# Patient Record
Sex: MALE | Race: WHITE | NOT HISPANIC OR LATINO | Employment: FULL TIME | URBAN - METROPOLITAN AREA
[De-identification: names, ages, dates, MRNs, and addresses within clinical notes are randomized per-mention and may not be internally consistent; named-entity substitution may affect disease eponyms.]

---

## 2017-02-13 ENCOUNTER — ALLSCRIPTS OFFICE VISIT (OUTPATIENT)
Dept: OTHER | Facility: OTHER | Age: 62
End: 2017-02-13

## 2017-03-08 ENCOUNTER — ALLSCRIPTS OFFICE VISIT (OUTPATIENT)
Dept: OTHER | Facility: OTHER | Age: 62
End: 2017-03-08

## 2017-03-21 ENCOUNTER — TRANSCRIBE ORDERS (OUTPATIENT)
Dept: ADMINISTRATIVE | Facility: HOSPITAL | Age: 62
End: 2017-03-21

## 2017-03-21 DIAGNOSIS — R22.1 NECK MASS: Primary | ICD-10-CM

## 2017-03-27 ENCOUNTER — HOSPITAL ENCOUNTER (OUTPATIENT)
Dept: CT IMAGING | Facility: HOSPITAL | Age: 62
Discharge: HOME | End: 2017-03-27
Payer: COMMERCIAL

## 2017-03-27 DIAGNOSIS — R22.1 NECK MASS: ICD-10-CM

## 2017-03-27 PROCEDURE — 70491 CT SOFT TISSUE NECK W/DYE: CPT

## 2017-03-27 RX ADMIN — IOHEXOL 85 ML: 350 INJECTION, SOLUTION INTRAVENOUS at 06:59

## 2017-03-30 ENCOUNTER — ALLSCRIPTS OFFICE VISIT (OUTPATIENT)
Dept: OTHER | Facility: OTHER | Age: 62
End: 2017-03-30

## 2017-04-07 ENCOUNTER — GENERIC CONVERSION - ENCOUNTER (OUTPATIENT)
Dept: OTHER | Facility: OTHER | Age: 62
End: 2017-04-07

## 2017-05-22 ENCOUNTER — GENERIC CONVERSION - ENCOUNTER (OUTPATIENT)
Dept: OTHER | Facility: OTHER | Age: 62
End: 2017-05-22

## 2017-06-27 ENCOUNTER — HOSPITAL ENCOUNTER (OUTPATIENT)
Facility: HOSPITAL | Age: 62
Setting detail: OUTPATIENT SURGERY
Discharge: HOME | End: 2017-06-27
Attending: SURGERY | Admitting: SURGERY
Payer: COMMERCIAL

## 2017-06-27 ENCOUNTER — ANESTHESIA (OUTPATIENT)
Dept: GASTROENTEROLOGY | Facility: HOSPITAL | Age: 62
End: 2017-06-27
Payer: COMMERCIAL

## 2017-06-27 ENCOUNTER — GENERIC CONVERSION - ENCOUNTER (OUTPATIENT)
Dept: OTHER | Facility: OTHER | Age: 62
End: 2017-06-27

## 2017-06-27 ENCOUNTER — ANESTHESIA EVENT (OUTPATIENT)
Dept: GASTROENTEROLOGY | Facility: HOSPITAL | Age: 62
End: 2017-06-27
Payer: COMMERCIAL

## 2017-06-27 VITALS
HEART RATE: 70 BPM | TEMPERATURE: 98.3 F | WEIGHT: 300 LBS | SYSTOLIC BLOOD PRESSURE: 122 MMHG | HEIGHT: 69 IN | BODY MASS INDEX: 44.43 KG/M2 | DIASTOLIC BLOOD PRESSURE: 76 MMHG | OXYGEN SATURATION: 96 % | RESPIRATION RATE: 16 BRPM

## 2017-06-27 RX ORDER — PROPOFOL 10 MG/ML
INJECTION, EMULSION INTRAVENOUS AS NEEDED
Status: DISCONTINUED | OUTPATIENT
Start: 2017-06-27 | End: 2017-06-27 | Stop reason: SURG

## 2017-06-27 RX ORDER — ASPIRIN 325 MG
325 TABLET ORAL DAILY
COMMUNITY
End: 2019-05-29 | Stop reason: HOSPADM

## 2017-06-27 RX ORDER — SODIUM CHLORIDE 9 MG/ML
125 INJECTION, SOLUTION INTRAVENOUS CONTINUOUS
Status: DISCONTINUED | OUTPATIENT
Start: 2017-06-27 | End: 2017-06-27 | Stop reason: HOSPADM

## 2017-06-27 RX ORDER — PROPOFOL 10 MG/ML
INJECTION, EMULSION INTRAVENOUS CONTINUOUS PRN
Status: DISCONTINUED | OUTPATIENT
Start: 2017-06-27 | End: 2017-06-27 | Stop reason: SURG

## 2017-06-27 RX ADMIN — PROPOFOL 30 MG: 10 INJECTION, EMULSION INTRAVENOUS at 11:02

## 2017-06-27 RX ADMIN — SODIUM CHLORIDE 125 ML/HR: 0.9 INJECTION, SOLUTION INTRAVENOUS at 10:00

## 2017-06-27 RX ADMIN — PROPOFOL 100 MCG/KG/MIN: 10 INJECTION, EMULSION INTRAVENOUS at 10:50

## 2017-06-27 RX ADMIN — PROPOFOL 100 MG: 10 INJECTION, EMULSION INTRAVENOUS at 10:50

## 2017-07-07 ENCOUNTER — GENERIC CONVERSION - ENCOUNTER (OUTPATIENT)
Dept: OTHER | Facility: OTHER | Age: 62
End: 2017-07-07

## 2017-07-24 ENCOUNTER — GENERIC CONVERSION - ENCOUNTER (OUTPATIENT)
Dept: OTHER | Facility: OTHER | Age: 62
End: 2017-07-24

## 2017-10-03 LAB
A/G RATIO (HISTORICAL): 1.5 (CALC) (ref 1–2.5)
ALBUMIN SERPL BCP-MCNC: 4.1 G/DL (ref 3.6–5.1)
ALP SERPL-CCNC: 51 U/L (ref 40–115)
ALT SERPL W P-5'-P-CCNC: 24 U/L (ref 9–46)
AST SERPL W P-5'-P-CCNC: 16 U/L (ref 10–35)
BASOPHILS # BLD AUTO: 0.8 %
BASOPHILS # BLD AUTO: 48 CELLS/UL (ref 0–200)
BILIRUB SERPL-MCNC: 0.8 MG/DL (ref 0.2–1.2)
BUN SERPL-MCNC: 24 MG/DL (ref 7–25)
BUN/CREA RATIO (HISTORICAL): 17 (CALC) (ref 6–22)
CALCIUM (ADJUSTED FOR ALBUMIN) (HISTORICAL): 9.3 MG/DL (CALC) (ref 8.6–10.2)
CALCIUM SERPL-MCNC: 9.1 MG/DL (ref 8.6–10.3)
CHLORIDE SERPL-SCNC: 105 MMOL/L (ref 98–110)
CHOLEST SERPL-MCNC: 186 MG/DL
CHOLEST/HDLC SERPL: 5.5 (CALC)
CO2 SERPL-SCNC: 28 MMOL/L (ref 20–31)
CREAT SERPL-MCNC: 1.39 MG/DL (ref 0.7–1.25)
DEPRECATED RDW RBC AUTO: 12.9 % (ref 11–15)
EGFR AFRICAN AMERICAN (HISTORICAL): 63 ML/MIN/1.73M2
EGFR-AMERICAN CALC (HISTORICAL): 54 ML/MIN/1.73M2
EOSINOPHIL # BLD AUTO: 120 CELLS/UL (ref 15–500)
EOSINOPHIL # BLD AUTO: 2 %
GAMMA GLOBULIN (HISTORICAL): 2.8 G/DL (CALC) (ref 1.9–3.7)
GLUCOSE (HISTORICAL): 110 MG/DL (ref 65–99)
HCT VFR BLD AUTO: 43 % (ref 38.5–50)
HDLC SERPL-MCNC: 34 MG/DL
HEPATITIS C ANTIBODY (HISTORICAL): NORMAL
HGB BLD-MCNC: 14.7 G/DL (ref 13.2–17.1)
LDL CHOLESTEROL (HISTORICAL): 123 MG/DL (CALC)
LYMPHOCYTES # BLD AUTO: 2418 CELLS/UL (ref 850–3900)
LYMPHOCYTES # BLD AUTO: 40.3 %
MCH RBC QN AUTO: 30.9 PG (ref 27–33)
MCHC RBC AUTO-ENTMCNC: 34.2 G/DL (ref 32–36)
MCV RBC AUTO: 90.5 FL (ref 80–100)
MONOCYTES # BLD AUTO: 378 CELLS/UL (ref 200–950)
MONOCYTES (HISTORICAL): 6.3 %
NEUTROPHILS # BLD AUTO: 3036 CELLS/UL (ref 1500–7800)
NEUTROPHILS # BLD AUTO: 50.6 %
NON-HDL-CHOL (CHOL-HDL) (HISTORICAL): 152 MG/DL (CALC)
PLATELET # BLD AUTO: 142 THOUSAND/UL (ref 140–400)
PMV BLD AUTO: 12.2 FL (ref 7.5–12.5)
POTASSIUM SERPL-SCNC: 4.3 MMOL/L (ref 3.5–5.3)
RBC # BLD AUTO: 4.75 MILLION/UL (ref 4.2–5.8)
SIGNAL TO CUT-OFF (HISTORICAL): 0.01
SODIUM SERPL-SCNC: 140 MMOL/L (ref 135–146)
TOTAL PROTEIN (HISTORICAL): 6.9 G/DL (ref 6.1–8.1)
TRIGL SERPL-MCNC: 176 MG/DL
TSH SERPL DL<=0.05 MIU/L-ACNC: 3.01 MIU/L (ref 0.4–4.5)
WBC # BLD AUTO: 6 THOUSAND/UL (ref 3.8–10.8)

## 2017-10-10 ENCOUNTER — ALLSCRIPTS OFFICE VISIT (OUTPATIENT)
Dept: OTHER | Facility: OTHER | Age: 62
End: 2017-10-10

## 2017-11-06 ENCOUNTER — HOSPITAL ENCOUNTER (OUTPATIENT)
Dept: RADIOLOGY | Facility: HOSPITAL | Age: 62
Discharge: HOME | End: 2017-11-06
Attending: ORTHOPAEDIC SURGERY
Payer: COMMERCIAL

## 2017-11-06 ENCOUNTER — TRANSCRIBE ORDERS (OUTPATIENT)
Dept: ADMINISTRATIVE | Facility: HOSPITAL | Age: 62
End: 2017-11-06

## 2017-11-06 ENCOUNTER — ALLSCRIPTS OFFICE VISIT (OUTPATIENT)
Dept: OTHER | Facility: OTHER | Age: 62
End: 2017-11-06

## 2017-11-06 DIAGNOSIS — M19.011 PRIMARY OSTEOARTHRITIS OF RIGHT SHOULDER: ICD-10-CM

## 2017-11-06 DIAGNOSIS — M25.511 PAIN IN RIGHT SHOULDER: ICD-10-CM

## 2017-11-06 DIAGNOSIS — M19.011 OSTEOARTHRITIS OF RIGHT SHOULDER, UNSPECIFIED OSTEOARTHRITIS TYPE: Primary | ICD-10-CM

## 2017-11-06 PROCEDURE — 73030 X-RAY EXAM OF SHOULDER: CPT

## 2017-11-07 NOTE — PROGRESS NOTES
Assessment  1  Shoulder pain, right (719 41) (M26 511)   2  Localized primary osteoarthritis of right shoulder region (715 11) (M19 011)    Plan  Localized primary osteoarthritis of right shoulder region    · Follow Up After Surgery Evaluation and Treatment  Follow-up  Status: Hold For -  Scheduling  Requested for: 69ZME4406   · Schedule Surgery Treatment  Procedure  Status: Hold For - Scheduling  Requested for:  72TKF5933   · *1 - [facility]  s/p anatomic TSA  ROM, stretching and strength per standard total shoulder protocol  1-3 times a week for 12 weeks  home program  local modalities at therapist discretion  Status: Active  Requested for: 89OBE1334  Care Summary provided  : Yes   · * CT SHOULDER RIGHT WO CONTRAST; Status:Need Information - Financial  Authorization; Requested XAR:[id number];   Shoulder pain, right    · * XR SHOULDER 2+ VIEW RIGHT; Status:Active - Retrospective By Protocol  Authorization; Requested ISAC:[id number];     Discussion/Summary    The patient has advanced glenohumeral osteoarthritis of his right shoulder he is failed to improve with appropriate on the care over last 8 years, at this point he is indicated for right total shoulder arthroplasty  A thorough discussion was performed with the patient reviewing all operative and nonoperative options as well as the risks of the procedure  Risks discussed include but not limited to persistent pain, dislocation, loosening of the prosthesis, infection, need for further surgery including revision to a reverse total shoulder, rotator cuff rupture , neurovascular injury, as well as the risk of anesthesia  After this discussion all questions were answered and informed consent was obtained for right Total Shoulder Arthroplasty  preoperative CT scan is indicated to evaluate for the posterior wear of his glenoid we may have to use an augmented glenoid component     The patient was counseled regarding diagnostic results,-- prognosis,-- risks and benefits of treatment options  The patient has the current Goals: Pain-free function of right shoulder  The patent has the current Barriers: None  The risks and benefits of surgery were reviewed with the patient/guardian inclusive of but not limited to infection, failure to alleviate discomfort, failure of procedure, nerve injury, stiffness, blood clots and need for further surgery   Patient is able to Self-Care  The treatment plan was reviewed with the patient/guardian  The patient/guardian understands and agrees with the treatment plan     Self Referrals: Yes      History of Present Illness  Ximena Cook is a 59 y/o male who presents with right shoulder pain for years  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[employer], [employer] and [employer]  He has had multiple injections over the years  He saw multiple physicians for the right shoulder, last was [facility] in 2011  X-rays confirmed OA and had an open MRI which was nondiagnostic but had a questionable tear of the rotator cuff  He has crepitation and catching of the right shoulder  He has had injections with less than 2 months of relief  He has pain that is starting to interfere with activities of daily living and activities of enjoyment  No paresthesias  No weakness  Review of Systems    Constitutional: No fever or chills, feels well, no tiredness, no recent weight loss or weight gain  Cardiovascular: No complaints of chest pain, no palpitations, no leg claudication or lower extremity edema  Respiratory: No complaints of shortness of breath, no wheezing, no cough  Gastrointestinal: No complaints of abdominal pain, no constipation, no nausea or vomiting, no diarrhea or bloody stools  Genitourinary: No complaints of dysuria or incontinence, no hesitancy, no nocturia  Musculoskeletal: as noted in HPI  Integumentary: No complaints of skin rash or lesion, no itching or dry skin, no skin wounds     Neurological: No complaints of headache, no confusion, no numbness or tingling, no dizziness  ROS reviewed  Active Problems  1  Chronic low back pain (724 2,338 29) (M54 5,G89 29)   2  Chronic right shoulder pain (719 41,338 29) (M25 511,G89 29)   3  DM (diabetes mellitus screen) (V77 1) (Z13 1)   4  Dyslipidemia (272 4) (E78 5)   5  Elevated serum creatinine (790 99) (R79 89)   6  Erectile dysfunction (607 84) (N52 9)   7  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 3) (M47 816)   8  Herniation of lumbar intervertebral disc with radiculopathy (722 10) (M51 16)   9  Impaired fasting glucose (790 21) (R73 01)   10  Lumbar spinal stenosis (724 02) (M48 061)   11  Morbid obesity (278 01) (E66 01)   12  Need for hepatitis C screening test (V73 89) (Z11 59)   13  Need for prophylactic vaccination and inoculation against influenza (V04 81) (Z23)   14  Need for Tdap vaccination (V06 1) (Z23)   15  Obstructive sleep apnea (327 23) (G47 33)   16  Palpitations (785 1) (R00 2)   17  Right hip pain (719 45) (M25 551)   18  Screen for colon cancer (V76 51) (Z12 11)   19  Screening cholesterol level (V77 91) (Z13 220)   20  Screening PSA (prostate specific antigen) (V76 44) (Z12 5)   21  Shoulder pain, right (719 41) (M25 511)   22  Submandibular lymphadenopathy (785 6) (R59 0)    Past Medical History   · History of Bone pain (733 90) (M89 8X9)   · History of acute bronchitis (V12 69) (Z87 09)   · History of traveler's diarrhea (V12 79) (Z86 19)   · No pertinent past medical history   · History of No pertinent past surgical history   · History of Tinnitus, left (388 30) (H93 12)    The active problems and past medical history were reviewed and updated today  Surgical History   · Denied: History Of Prior Surgery    The surgical history was reviewed and updated today         Family History  Mother    · Family history of    · Family history of cardiac disorder (V17 49) (Z80 55)  Father    · Family history of    · Family history of lung cancer (V16 1) (Z80 1)    The family history was reviewed and updated today  Social History   · Assistive Devices: CPAP   · Never a smoker   · No illicit drug use   · Social alcohol use (Z78 9)  The social history was reviewed and updated today  Current Meds   1  Aspirin  MG Oral Tablet Delayed Release; Therapy: (Recorded:11Aug2014) to Recorded   2  Cialis 20 MG Oral Tablet; take 1 tablet 1 hour before activity as needed; Therapy: 32PIK1184 to (Ingris Faulkner)  Requested for: 42PTJ5894; Last   Rx:12Cjt1806 Ordered   3  [address]; Therapy: (Recorded:30Mar2017) to Recorded   4  Naproxen 500 MG Oral Tablet; TAKE 1-2 TABLET EVERY 12 HOURS AS NEEDED FOR   PAIN;   Therapy: 02CHY8800 to (Kel Adam)  Requested for: 87RJE0008; Last   Rx:10Oct2017 Ordered   5  NovoFine 32G X 6 MM Miscellaneous; Use once a day with Saxenda; Therapy: 77HCE8910 to (Evaluate:01Jan2018)  Requested for: 78SSX7702; Last   Rx:02Nov2017 Ordered   6  Probiotic CAPS; Therapy: (Recorded:30Mar2017) to Recorded   7  Saxenda 18 MG/3ML Subcutaneous Solution Pen-injector; 0 6mg SQ daily for 1 week   increase by 0 6mg weekly to 3mg daily if tolerated; Therapy: 17FYJ3247 to (Last CE:22SAL4149)  Requested for: 36RGG2467 Ordered    The medication list was reviewed and updated today  Allergies  1  No Known Drug Allergies    Vitals   Recorded: 57MLW4775 09:38AM   Heart Rate 70   Systolic 970, Sitting   Diastolic 84, Sitting   BP CUFF SIZE Large   Height 5 ft 9 in   Weight 310 lb 8 oz   BMI Calculated 45 85   BSA Calculated 2 49     Physical Exam    Right Shoulder: Appearance: no belly of the biceps amor deformity,-- no dislocation,-- no ecchymosis,-- no erythema-- and-- no shoulder swelling  Tenderness: trapezial, but-- not the deltoid  Palpatory findings include crepitus  Motor: 5/5 abduction-- and-- 5/5 external rotation  Forward flexion: painful restricted AROM 90 degrees  Abduction: painful restricted AROM 45 degrees  Internal rotation: painful restricted AROM right buttock degrees  External rotation: painful restricted AROM 30 degrees  Special Tests: positive Painful Arc,-- positive Rueda test,-- positive Neer test-- and-- positive Speed's test, but-- negative Drop Arm test-- and-- negative Empty Can test    Constitutional - General appearance: Normal    Musculoskeletal - Muscle strength/tone: Normal -- Upper extremity compartments: Normal    Neurologic - Sensation: Normal -- Upper extremity peripheral neuro exam: Normal    Psychiatric - Orientation to person, place, and time: Normal -- Mood and affect: Normal       Results/Data  I personally reviewed the films/images/results in the office today  My interpretation follows  X-ray Review 3 views right shoulder: advanced GH arthritis with posterior wear  Future Appointments    Date/Time Provider Specialty Site   [facility] [address]   01/09/2018 08:45 KRIS Ulrich  [address] [place] [address]   12/07/2017 02:00 PM KRIS Lim  [facility]     Surgery Scheduling Form  Surgery Schedule Form [facility] Standard:   Location: [place]   Confirmation Number:   PROCEDURE DETAILS   Procedure Date:   Requested Time:   Surgeon: Wilmar   Co-Surgeon:   [facility] Required:   Procedure: Right total shoulder arthroplasty   Bed:   Laterality/Level: Right  Case Length: 1 5 hours  Anticipated frozen section: NO  Anesthesia: General w/Regional     Procedure Codes: 06083   Pre-op diagnosis: Right shoulder glenohumeral osteoarthritis   Diagnosis Code(s): M19 011   Equipment:   Equipment Needs: Tornier flex stem and perform plus   Implants:     Is the patient able to walk up a flight of stairs, walk up a hill or do heavy housework WITHOUT having chest pain or shortness of breath?  YES    REGISTRATION & FINANCIAL CLEARANCE   FA Initials: Insurance:   Policy Number: Group Number:     PRE-ADMISSION TESTING/CLINICAL INFORMATION   PAT Location:       CONSULTS NEEDED:   Anesthesia Consult:   Medical Consult:   Cardiac Consult:    ALLERGIES AND ALERTS     Latex Allergy:   Penicillin Allergy:   Malignant Hyperthermia:   Diabetic Patient:     ERAS Patient:   COMMENTS   Scheduling Information Provided By:     CASE MANAGEMENT:      Signatures   Electronically signed by : KRIS Jolley ; Nov 6 2017 10:49AM EST                       (Author)

## 2017-11-10 ENCOUNTER — HOSPITAL ENCOUNTER (OUTPATIENT)
Dept: CT IMAGING | Facility: HOSPITAL | Age: 62
Discharge: HOME | End: 2017-11-10
Attending: ORTHOPAEDIC SURGERY
Payer: COMMERCIAL

## 2017-11-10 DIAGNOSIS — M19.011 OSTEOARTHRITIS OF RIGHT SHOULDER, UNSPECIFIED OSTEOARTHRITIS TYPE: ICD-10-CM

## 2017-11-10 PROCEDURE — 73200 CT UPPER EXTREMITY W/O DYE: CPT

## 2017-11-20 ENCOUNTER — TRANSCRIBE ORDERS (OUTPATIENT)
Dept: SLEEP CENTER | Facility: CLINIC | Age: 62
End: 2017-11-20

## 2017-11-20 ENCOUNTER — HOSPITAL ENCOUNTER (OUTPATIENT)
Dept: SLEEP CENTER | Facility: CLINIC | Age: 62
Discharge: HOME | End: 2017-11-20
Payer: COMMERCIAL

## 2017-11-20 DIAGNOSIS — G47.33 OSA (OBSTRUCTIVE SLEEP APNEA): ICD-10-CM

## 2017-11-20 DIAGNOSIS — G47.33 OSA (OBSTRUCTIVE SLEEP APNEA): Primary | ICD-10-CM

## 2017-12-07 ENCOUNTER — ALLSCRIPTS OFFICE VISIT (OUTPATIENT)
Dept: OTHER | Facility: OTHER | Age: 62
End: 2017-12-07

## 2017-12-08 NOTE — PROGRESS NOTES
Assessment    1  Preop examination (V72 84) (Z01 818)  2  Localized primary osteoarthritis of right shoulder region (715 11) (M19 011)  3  Morbid obesity (278 01) (E66 01)  4  Obstructive sleep apnea (327 23) (G47 33)  5  Mass of skin of back (782 2) (R22 2)    Plan  Morbid obesity    · Renew: Saxenda 18 MG/3ML Subcutaneous Solution Pen-injector; 0 6mg SQ daily for 1week increase by 0 6mg weekly to 3mg daily if tolerated  Preop examination    · EKG/ECG- POC; Status:Complete;   Done: 33JZV5453 02:27PM    Discussion/Summary  Surgical Clearance: He is at a LOW TO MODERATE risk from a cardiovascular standpoint at this time without any additional cardiac testing  Reevaluation needed, if he should present with symptoms prior to surgery/procedure  Comments: Dyana Quezadaburtonisaiah Medically stable for surgery; blood work recently done not available at this time  EKG looks fine  Will send Yeny Willy again to see if this is available at the pharmacy at this timeit is, he can start it nowsuing CPAPon the skin on his back-sebaceous cyst vs [id number]  The patient was counseled regarding impressions  Possible side effects of new medications were reviewed with the patient/guardian today  The treatment plan was reviewed with the patient/guardian  The patient/guardian understands and agrees with the treatment plan      Chief Complaint  Medical clearance for right shoulder replacement  History of Present Illness  Pre-Op Visit (Brief): The patient is being seen for a preoperative visit  The procedure is a(n) right shoulder replacement scheduled for 1/9/17 with Dr Jayjay Carty  The indication for surgery is end stage OA  Surgical Risk Assessment:  Prior Anesthesia: He had prior anesthesia-- and-- no prior adverse reaction to general anesthesia  Lifestyle Factors: denies alcohol use and denies tobacco use  Symptoms: no symptoms  Living Situation: home is secure and supportive and no post-op concerns with his living situation     HPI: Chronic shoulder issues, surgery scheduledlast visit, prescribed Saxenda but it is backordered      Review of Systems   Constitutional: recent weight gain-- and-- Jayshree Simental has not been available ?backordered, but-- no fever-- and-- no chills  Cardiovascular: no chest pain-- and-- no palpitations  Respiratory: no shortness of breath-- and-- no cough  Gastrointestinal: no abdominal pain,-- no constipation-- and-- no diarrhea  Musculoskeletal: as noted in HPI  Integumentary: lump under the skin on his back  Active Problems  1  Chronic low back pain (724 2,338 29) (M54 5,G89 29)  2  Chronic right shoulder pain (719 41,338 29) (M25 511,G89 29)  3  DM (diabetes mellitus screen) (V77 1) (Z13 1)  4  Dyslipidemia (272 4) (E78 5)  5  Elevated serum creatinine (790 99) (R79 89)  6  Erectile dysfunction (607 84) (N52 9)  7  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 3) (M47 816)  8  Herniation of lumbar intervertebral disc with radiculopathy (722 10) (M51 16)  9  Impaired fasting glucose (790 21) (R73 01)  10  Localized primary osteoarthritis of right shoulder region (715 11) (M19 011)  11  Lumbar spinal stenosis (724 02) (M48 061)  12  Morbid obesity (278 01) (E66 01)  13  Need for hepatitis C screening test (V73 89) (Z11 59)  14  Need for prophylactic vaccination and inoculation against influenza (V04 81) (Z23)  15  Need for Tdap vaccination (V06 1) (Z23)  16  Obstructive sleep apnea (327 23) (G47 33)  17  Palpitations (785 1) (R00 2)  18  Right hip pain (719 45) (M25 551)  19  Screen for colon cancer (V76 51) (Z12 11)  20  Screening cholesterol level (V77 91) (Z13 220)  21  Screening PSA (prostate specific antigen) (V76 44) (Z12 5)  22   Submandibular lymphadenopathy (785 6) (R59 0)    Past Medical History   · History of Bone pain (733 90) (M89 8X9)   · History of acute bronchitis (V12 69) (Z87 09)   · History of traveler's diarrhea (V12 79) (Z86 19)   · No pertinent past medical history   · History of No pertinent past surgical history   · History of Tinnitus, left (388 30) (H93 12)    The active problems and past medical history were reviewed and updated today  Surgical History   · Denied: History Of Prior Surgery    The surgical history was reviewed and updated today  Family History  Mother    · Family history of    · Family history of cardiac disorder (V17 49) (Z80 55)  Father    · Family history of    · Family history of lung cancer (V16 1) (Z80 1)    The family history was reviewed and updated today  Social History     · Assistive Devices: CPAP   · Never a smoker   · No illicit drug use   · Social alcohol use (Z78 9)  The social history was reviewed and updated today  Current Meds  1  Aspirin  MG Oral Tablet Delayed Release; Therapy: (Recorded:2014) to Recorded  2  Cialis 20 MG Oral Tablet; take 1 tablet 1 hour before activity as needed; Therapy: 37QFV9577 to (95 113149)  Requested for: 25EMV8835; Last Rx:54Ouq3718 Ordered  3  [address] [place] [address]; Therapy: (Recorded:2017) to Recorded  4  Naproxen 500 MG Oral Tablet; TAKE 1-2 TABLET EVERY 12 HOURS AS NEEDED FOR PAIN; Therapy: 10UZG9142 to (Ashwin Eng)  Requested for: 42BNR7006; Last Rx:00Lqg5224 Ordered  5  NovoFine 32G X 6 MM Miscellaneous; Use once a day with Saxenda; Therapy: 39IHL5109 to (Evaluate:2018)  Requested for: 38DBY3951; Last Rx:2017 Ordered  6  Probiotic CAPS; Therapy: (Recorded:2017) to Recorded  7  Saxenda 18 MG/3ML Subcutaneous Solution Pen-injector; 0 6mg SQ daily for 1 week increase by 0 6mg weekly to 3mg daily if tolerated; Therapy: 61DYG1661 to (Last GS:28FCY9716)  Requested for: 54ULB2354 Ordered    The medication list was reviewed and updated today  Allergies  1   No Known Drug Allergies    Vitals   Recorded: 88NTU4964 01:58PM   Heart Rate 57   Systolic 085   Diastolic 74   Height 5 ft 9 in   Weight 314 lb 6 oz   BMI Calculated 46 43   BSA Calculated 2 5   O2 Saturation 97       Physical Exam   Constitutional  General appearance: No acute distress, well appearing and well nourished  morbidly obese  Head and Face  Head and face: Normal    Eyes  Conjunctiva and lids: No erythema, swelling or discharge  Ears, Nose, Mouth, and Throat  Otoscopic examination: Tympanic membranes translucent with normal light reflex  Canals patent without erythema  Oropharynx: Normal with no erythema, edema, exudate or lesions  Neck  Neck: Supple, symmetric, trachea midline, no masses  Thyroid: Normal, no thyromegaly  Pulmonary  Respiratory effort: No increased work of breathing or signs of respiratory distress  Auscultation of lungs: Clear to auscultation  Cardiovascular  Auscultation of heart: Normal rate and rhythm, normal S1 and S2, no murmurs  Abdomen  Abdomen: Non-tender, no masses  Lymphatic  Palpation of lymph nodes in neck: No lymphadenopathy  Skin soft movable mass, subcutaneous in the middle of the back  Psychiatric  Orientation to person, place and time: Normal    Mood and affect: Normal        End of Encounter Meds    1  Naproxen 500 MG Oral Tablet; TAKE 1-2 TABLET EVERY 12 HOURS AS NEEDED FOR PAIN; Therapy: 86EQQ8802 to (05 06 52 16 25)  Requested for: 98KXI6843; Last Rx:10Oct2017 Ordered    2  Cialis 20 MG Oral Tablet; take 1 tablet 1 hour before activity as needed; Therapy: 39JAT3484 to (Acacia Simmons)  Requested for: 21HTX2699; Last Rx:06Ase0394 Ordered    3  NovoFine 32G X 6 MM Miscellaneous; Use once a day with Saxenda; Therapy: 96AHI2940 to (Evaluate:01Jan2018)  Requested for: 29RUK2792; Last Rx:02Nov2017 Ordered  4  Saxenda 18 MG/3ML Subcutaneous Solution Pen-injector; 0 6mg SQ daily for 1 week increase by 0 6mg weekly to 3mg daily if tolerated; Therapy: 96MIQ0301 to (Last Rx:80Kqv9745)  Requested for: 55VLK9767 Ordered    5  Aspirin  MG Oral Tablet Delayed Release; Therapy: (Recorded:11Aug2014) to Recorded  6  [address];  Therapy: (Recorded:30Mar2017) to Recorded  7  Probiotic CAPS; Therapy: (Recorded:30Mar2017) to Recorded    Future Appointments    Date/Time Provider Specialty Site   01/22/2018 08:00 AM April [facility] [address]   01/09/2018 08:45 KRIS Fuller   [facility]   Electronically signed by : Melchor Bloch, M D ; Dec  7 2017  2:36PM EST                       (Author)

## 2017-12-13 RX ORDER — SACCHAROMYCES BOULARDII 250 MG
250 CAPSULE ORAL 2 TIMES DAILY
COMMUNITY
End: 2017-12-13

## 2017-12-13 RX ORDER — NAPROXEN 500 MG/1
250 TABLET ORAL 2 TIMES DAILY PRN
COMMUNITY
End: 2019-01-28 | Stop reason: SDUPTHER

## 2017-12-13 RX ORDER — TADALAFIL 20 MG/1
20 TABLET ORAL DAILY PRN
COMMUNITY
End: 2018-02-19 | Stop reason: SDUPTHER

## 2017-12-13 NOTE — PRE-PROCEDURE INSTRUCTIONS
Pre-Surgery Instructions:   Medication Instructions    aspirin 325 mg tablet Instructed patient per Anesthesia Guidelines   KRILL OIL PO Instructed patient per Anesthesia Guidelines   Liraglutide -Weight Management (SAXENDA) 18 MG/3ML SOPN Patient was instructed to contact Physician for medication instruction   naproxen (NAPROSYN) 500 mg tablet Instructed patient per Anesthesia Guidelines   tadalafil (CIALIS) 20 MG tablet Instructed patient per Anesthesia Guidelines   [DISCONTINUED] saccharomyces boulardii (FLORASTOR) 250 mg capsule Instructed patient per Anesthesia Guidelines  Medication Instruction (Aspirin - Blood Thinners)    Your surgeon may have you stop aspirin up to a week early if you are having intracranial, spine, middle ear, posterior eye or prostate surgery  If not please continue to take this medication on your normal schedule  If you take this in the morning you may do so with a sip of water  Aspirin  MG Oral Tablet Delayed Release                    Medication Instruction (NSAID - Pain Medication)    Please stop ibuprofen, naproxen and other non-steroidal anti-inflammatory drugs (NSAIDS) for 1 week before surgery  Naproxen 500 MG Oral Tablet          Sleep Apnea    Please notify surgeon and anesthesiologist if you have sleep apnea, severe snoring, or daytime somnolence  For those sleep apnea patients with continuous positive airway pressure (CPAP or BiPAP) machines, please bring your machine to the hospital on the day of surgery          Obstructive sleep apnea      Accept All Complete All   Last signed: Never    Request Signature   Add New Task Show Removed Tasks  Pre procedure instructions given,verbalizes understanding

## 2018-01-02 ENCOUNTER — GENERIC CONVERSION - ENCOUNTER (OUTPATIENT)
Dept: OTHER | Facility: OTHER | Age: 63
End: 2018-01-02

## 2018-01-02 ENCOUNTER — APPOINTMENT (OUTPATIENT)
Dept: PHYSICAL THERAPY | Facility: CLINIC | Age: 63
End: 2018-01-02
Payer: COMMERCIAL

## 2018-01-02 DIAGNOSIS — M19.011 PRIMARY OSTEOARTHRITIS OF RIGHT SHOULDER: ICD-10-CM

## 2018-01-02 PROCEDURE — 97161 PT EVAL LOW COMPLEX 20 MIN: CPT

## 2018-01-02 PROCEDURE — G8991 OTHER PT/OT GOAL STATUS: HCPCS

## 2018-01-02 PROCEDURE — G8990 OTHER PT/OT CURRENT STATUS: HCPCS

## 2018-01-08 ENCOUNTER — ANESTHESIA EVENT (OUTPATIENT)
Dept: PERIOP | Facility: HOSPITAL | Age: 63
DRG: 483 | End: 2018-01-08
Payer: COMMERCIAL

## 2018-01-09 ENCOUNTER — ANESTHESIA (OUTPATIENT)
Dept: PERIOP | Facility: HOSPITAL | Age: 63
DRG: 483 | End: 2018-01-09
Payer: COMMERCIAL

## 2018-01-09 ENCOUNTER — HOSPITAL ENCOUNTER (INPATIENT)
Facility: HOSPITAL | Age: 63
LOS: 1 days | Discharge: HOME | DRG: 483 | End: 2018-01-10
Attending: ORTHOPAEDIC SURGERY | Admitting: ORTHOPAEDIC SURGERY
Payer: COMMERCIAL

## 2018-01-09 ENCOUNTER — APPOINTMENT (INPATIENT)
Dept: RADIOLOGY | Facility: HOSPITAL | Age: 63
DRG: 483 | End: 2018-01-09
Payer: COMMERCIAL

## 2018-01-09 PROBLEM — M19.011 OSTEOARTHRITIS OF RIGHT GLENOHUMERAL JOINT: Status: ACTIVE | Noted: 2018-01-09

## 2018-01-09 LAB
ABO GROUP BLD: NORMAL
BLD GP AB SCN SERPL QL: NEGATIVE
RH BLD: POSITIVE
SPECIMEN EXPIRATION DATE: NORMAL

## 2018-01-09 PROCEDURE — C1776 JOINT DEVICE (IMPLANTABLE): HCPCS | Performed by: ORTHOPAEDIC SURGERY

## 2018-01-09 PROCEDURE — C1713 ANCHOR/SCREW BN/BN,TIS/BN: HCPCS | Performed by: ORTHOPAEDIC SURGERY

## 2018-01-09 PROCEDURE — 86901 BLOOD TYPING SEROLOGIC RH(D): CPT | Performed by: ORTHOPAEDIC SURGERY

## 2018-01-09 PROCEDURE — 73020 X-RAY EXAM OF SHOULDER: CPT

## 2018-01-09 PROCEDURE — 86850 RBC ANTIBODY SCREEN: CPT | Performed by: ORTHOPAEDIC SURGERY

## 2018-01-09 PROCEDURE — 0LS30ZZ REPOSITION RIGHT UPPER ARM TENDON, OPEN APPROACH: ICD-10-PCS | Performed by: ORTHOPAEDIC SURGERY

## 2018-01-09 PROCEDURE — 0RRJ0JZ REPLACEMENT OF RIGHT SHOULDER JOINT WITH SYNTHETIC SUBSTITUTE, OPEN APPROACH: ICD-10-PCS | Performed by: ORTHOPAEDIC SURGERY

## 2018-01-09 PROCEDURE — 86900 BLOOD TYPING SEROLOGIC ABO: CPT | Performed by: ORTHOPAEDIC SURGERY

## 2018-01-09 DEVICE — IMPLANTABLE DEVICE
Type: IMPLANTABLE DEVICE | Site: SHOULDER | Status: FUNCTIONAL
Brand: AEQUALIS™ ASCEND™ FLEX

## 2018-01-09 DEVICE — IMPLANTABLE DEVICE
Type: IMPLANTABLE DEVICE | Site: SHOULDER | Status: FUNCTIONAL
Brand: AEQUALIS™ PERFORM

## 2018-01-09 DEVICE — SMARTSET HV HIGH VISCOSITY BONE CEMENT 40G
Type: IMPLANTABLE DEVICE | Site: SHOULDER | Status: FUNCTIONAL
Brand: SMARTSET

## 2018-01-09 DEVICE — IMPLANTABLE DEVICE
Type: IMPLANTABLE DEVICE | Site: SHOULDER | Status: FUNCTIONAL
Brand: FLEX SHOULDER SYSTEM

## 2018-01-09 RX ORDER — SENNOSIDES 8.6 MG
1 TABLET ORAL DAILY
Status: DISCONTINUED | OUTPATIENT
Start: 2018-01-09 | End: 2018-01-10 | Stop reason: HOSPADM

## 2018-01-09 RX ORDER — MIDAZOLAM HYDROCHLORIDE 1 MG/ML
INJECTION INTRAMUSCULAR; INTRAVENOUS AS NEEDED
Status: DISCONTINUED | OUTPATIENT
Start: 2018-01-09 | End: 2018-01-09

## 2018-01-09 RX ORDER — ACETAMINOPHEN 325 MG/1
975 TABLET ORAL ONCE
Status: COMPLETED | OUTPATIENT
Start: 2018-01-09 | End: 2018-01-09

## 2018-01-09 RX ORDER — DIPHENHYDRAMINE HYDROCHLORIDE 50 MG/ML
12.5 INJECTION INTRAMUSCULAR; INTRAVENOUS ONCE AS NEEDED
Status: DISCONTINUED | OUTPATIENT
Start: 2018-01-09 | End: 2018-01-09 | Stop reason: HOSPADM

## 2018-01-09 RX ORDER — ONDANSETRON 2 MG/ML
4 INJECTION INTRAMUSCULAR; INTRAVENOUS ONCE AS NEEDED
Status: DISCONTINUED | OUTPATIENT
Start: 2018-01-09 | End: 2018-01-09 | Stop reason: HOSPADM

## 2018-01-09 RX ORDER — OXYCODONE HYDROCHLORIDE 5 MG/1
TABLET ORAL
Qty: 40 TABLET | Refills: 0 | Status: SHIPPED | OUTPATIENT
Start: 2018-01-09 | End: 2018-03-26 | Stop reason: ALTCHOICE

## 2018-01-09 RX ORDER — SODIUM CHLORIDE, SODIUM LACTATE, POTASSIUM CHLORIDE, CALCIUM CHLORIDE 600; 310; 30; 20 MG/100ML; MG/100ML; MG/100ML; MG/100ML
1.5 INJECTION, SOLUTION INTRAVENOUS CONTINUOUS
Status: DISCONTINUED | OUTPATIENT
Start: 2018-01-09 | End: 2018-01-10 | Stop reason: HOSPADM

## 2018-01-09 RX ORDER — ONDANSETRON 2 MG/ML
4 INJECTION INTRAMUSCULAR; INTRAVENOUS EVERY 6 HOURS PRN
Status: DISCONTINUED | OUTPATIENT
Start: 2018-01-09 | End: 2018-01-10 | Stop reason: HOSPADM

## 2018-01-09 RX ORDER — FENTANYL CITRATE 50 UG/ML
INJECTION, SOLUTION INTRAMUSCULAR; INTRAVENOUS AS NEEDED
Status: DISCONTINUED | OUTPATIENT
Start: 2018-01-09 | End: 2018-01-09 | Stop reason: SURG

## 2018-01-09 RX ORDER — OXYCODONE HYDROCHLORIDE 5 MG/1
5 TABLET ORAL EVERY 4 HOURS PRN
Status: DISCONTINUED | OUTPATIENT
Start: 2018-01-09 | End: 2018-01-10 | Stop reason: HOSPADM

## 2018-01-09 RX ORDER — METOCLOPRAMIDE HYDROCHLORIDE 5 MG/ML
10 INJECTION INTRAMUSCULAR; INTRAVENOUS ONCE AS NEEDED
Status: DISCONTINUED | OUTPATIENT
Start: 2018-01-09 | End: 2018-01-09 | Stop reason: HOSPADM

## 2018-01-09 RX ORDER — PROPOFOL 10 MG/ML
INJECTION, EMULSION INTRAVENOUS AS NEEDED
Status: DISCONTINUED | OUTPATIENT
Start: 2018-01-09 | End: 2018-01-09 | Stop reason: SURG

## 2018-01-09 RX ORDER — SUCCINYLCHOLINE CHLORIDE 20 MG/ML
INJECTION INTRAMUSCULAR; INTRAVENOUS AS NEEDED
Status: DISCONTINUED | OUTPATIENT
Start: 2018-01-09 | End: 2018-01-09 | Stop reason: SURG

## 2018-01-09 RX ORDER — KETOROLAC TROMETHAMINE 30 MG/ML
INJECTION, SOLUTION INTRAMUSCULAR; INTRAVENOUS AS NEEDED
Status: DISCONTINUED | OUTPATIENT
Start: 2018-01-09 | End: 2018-01-09 | Stop reason: SURG

## 2018-01-09 RX ORDER — LIDOCAINE HYDROCHLORIDE 10 MG/ML
INJECTION, SOLUTION INFILTRATION; PERINEURAL AS NEEDED
Status: DISCONTINUED | OUTPATIENT
Start: 2018-01-09 | End: 2018-01-09 | Stop reason: SURG

## 2018-01-09 RX ORDER — MORPHINE SULFATE 10 MG/ML
2 INJECTION, SOLUTION INTRAMUSCULAR; INTRAVENOUS
Status: DISCONTINUED | OUTPATIENT
Start: 2018-01-09 | End: 2018-01-10 | Stop reason: HOSPADM

## 2018-01-09 RX ORDER — ROPIVACAINE HYDROCHLORIDE 5 MG/ML
INJECTION, SOLUTION EPIDURAL; INFILTRATION; PERINEURAL AS NEEDED
Status: DISCONTINUED | OUTPATIENT
Start: 2018-01-09 | End: 2018-01-09 | Stop reason: SURG

## 2018-01-09 RX ORDER — ONDANSETRON 2 MG/ML
INJECTION INTRAMUSCULAR; INTRAVENOUS AS NEEDED
Status: DISCONTINUED | OUTPATIENT
Start: 2018-01-09 | End: 2018-01-09 | Stop reason: SURG

## 2018-01-09 RX ORDER — TADALAFIL 20 MG/1
20 TABLET ORAL DAILY PRN
Status: DISCONTINUED | OUTPATIENT
Start: 2018-01-09 | End: 2018-01-09

## 2018-01-09 RX ORDER — OXYCODONE HYDROCHLORIDE 10 MG/1
10 TABLET ORAL EVERY 4 HOURS PRN
Status: DISCONTINUED | OUTPATIENT
Start: 2018-01-09 | End: 2018-01-10 | Stop reason: HOSPADM

## 2018-01-09 RX ORDER — ACETAMINOPHEN 325 MG/1
650 TABLET ORAL EVERY 4 HOURS PRN
Status: DISCONTINUED | OUTPATIENT
Start: 2018-01-09 | End: 2018-01-10 | Stop reason: HOSPADM

## 2018-01-09 RX ORDER — SODIUM CHLORIDE, SODIUM LACTATE, POTASSIUM CHLORIDE, CALCIUM CHLORIDE 600; 310; 30; 20 MG/100ML; MG/100ML; MG/100ML; MG/100ML
125 INJECTION, SOLUTION INTRAVENOUS CONTINUOUS
Status: DISCONTINUED | OUTPATIENT
Start: 2018-01-09 | End: 2018-01-10 | Stop reason: HOSPADM

## 2018-01-09 RX ORDER — GLYCOPYRROLATE 0.2 MG/ML
INJECTION INTRAMUSCULAR; INTRAVENOUS AS NEEDED
Status: DISCONTINUED | OUTPATIENT
Start: 2018-01-09 | End: 2018-01-09 | Stop reason: SURG

## 2018-01-09 RX ORDER — PREGABALIN 75 MG/1
75 CAPSULE ORAL ONCE
Status: COMPLETED | OUTPATIENT
Start: 2018-01-09 | End: 2018-01-09

## 2018-01-09 RX ORDER — MIDAZOLAM HYDROCHLORIDE 1 MG/ML
INJECTION INTRAMUSCULAR; INTRAVENOUS
Status: COMPLETED
Start: 2018-01-09 | End: 2018-01-09

## 2018-01-09 RX ORDER — ROCURONIUM BROMIDE 10 MG/ML
INJECTION, SOLUTION INTRAVENOUS AS NEEDED
Status: DISCONTINUED | OUTPATIENT
Start: 2018-01-09 | End: 2018-01-09 | Stop reason: SURG

## 2018-01-09 RX ORDER — FENTANYL CITRATE/PF 50 MCG/ML
25 SYRINGE (ML) INJECTION
Status: COMPLETED | OUTPATIENT
Start: 2018-01-09 | End: 2018-01-09

## 2018-01-09 RX ORDER — MIDAZOLAM HYDROCHLORIDE 1 MG/ML
INJECTION INTRAMUSCULAR; INTRAVENOUS AS NEEDED
Status: DISCONTINUED | OUTPATIENT
Start: 2018-01-09 | End: 2018-01-09 | Stop reason: SURG

## 2018-01-09 RX ORDER — EPHEDRINE SULFATE 50 MG/ML
INJECTION, SOLUTION INTRAVENOUS AS NEEDED
Status: DISCONTINUED | OUTPATIENT
Start: 2018-01-09 | End: 2018-01-09 | Stop reason: SURG

## 2018-01-09 RX ORDER — MAGNESIUM HYDROXIDE 1200 MG/15ML
LIQUID ORAL AS NEEDED
Status: DISCONTINUED | OUTPATIENT
Start: 2018-01-09 | End: 2018-01-09 | Stop reason: HOSPADM

## 2018-01-09 RX ORDER — ALBUTEROL SULFATE 2.5 MG/3ML
2.5 SOLUTION RESPIRATORY (INHALATION) AS NEEDED
Status: DISCONTINUED | OUTPATIENT
Start: 2018-01-09 | End: 2018-01-09 | Stop reason: HOSPADM

## 2018-01-09 RX ORDER — DOCUSATE SODIUM 100 MG/1
100 CAPSULE, LIQUID FILLED ORAL 2 TIMES DAILY
Status: DISCONTINUED | OUTPATIENT
Start: 2018-01-09 | End: 2018-01-10 | Stop reason: HOSPADM

## 2018-01-09 RX ADMIN — ACETAMINOPHEN 975 MG: 325 TABLET, FILM COATED ORAL at 06:19

## 2018-01-09 RX ADMIN — FENTANYL CITRATE 25 MCG: 50 INJECTION INTRAMUSCULAR; INTRAVENOUS at 10:22

## 2018-01-09 RX ADMIN — SODIUM CHLORIDE, SODIUM LACTATE, POTASSIUM CHLORIDE, AND CALCIUM CHLORIDE: .6; .31; .03; .02 INJECTION, SOLUTION INTRAVENOUS at 07:49

## 2018-01-09 RX ADMIN — FENTANYL CITRATE 25 MCG: 50 INJECTION INTRAMUSCULAR; INTRAVENOUS at 10:09

## 2018-01-09 RX ADMIN — ONDANSETRON 4 MG: 2 INJECTION INTRAMUSCULAR; INTRAVENOUS at 08:08

## 2018-01-09 RX ADMIN — FENTANYL CITRATE 25 MCG: 50 INJECTION INTRAMUSCULAR; INTRAVENOUS at 10:15

## 2018-01-09 RX ADMIN — CEFAZOLIN SODIUM 1000 MG: 1 SOLUTION INTRAVENOUS at 08:11

## 2018-01-09 RX ADMIN — SODIUM CHLORIDE, SODIUM LACTATE, POTASSIUM CHLORIDE, AND CALCIUM CHLORIDE: .6; .31; .03; .02 INJECTION, SOLUTION INTRAVENOUS at 07:30

## 2018-01-09 RX ADMIN — EPHEDRINE SULFATE 5 MG: 50 INJECTION, SOLUTION INTRAMUSCULAR; INTRAVENOUS; SUBCUTANEOUS at 08:30

## 2018-01-09 RX ADMIN — CEFAZOLIN SODIUM 2000 MG: 2 SOLUTION INTRAVENOUS at 08:11

## 2018-01-09 RX ADMIN — FENTANYL CITRATE 50 MCG: 50 INJECTION, SOLUTION INTRAMUSCULAR; INTRAVENOUS at 07:44

## 2018-01-09 RX ADMIN — PREGABALIN 75 MG: 75 CAPSULE ORAL at 06:19

## 2018-01-09 RX ADMIN — FENTANYL CITRATE 25 MCG: 50 INJECTION INTRAMUSCULAR; INTRAVENOUS at 10:12

## 2018-01-09 RX ADMIN — OXYCODONE HYDROCHLORIDE 10 MG: 10 TABLET ORAL at 21:18

## 2018-01-09 RX ADMIN — ACETAMINOPHEN 650 MG: 325 TABLET, FILM COATED ORAL at 17:09

## 2018-01-09 RX ADMIN — DOCUSATE SODIUM 100 MG: 100 CAPSULE, LIQUID FILLED ORAL at 17:09

## 2018-01-09 RX ADMIN — FENTANYL CITRATE 25 MCG: 50 INJECTION INTRAMUSCULAR; INTRAVENOUS at 10:18

## 2018-01-09 RX ADMIN — OXYCODONE HYDROCHLORIDE 10 MG: 10 TABLET ORAL at 11:57

## 2018-01-09 RX ADMIN — SENNOSIDES 8.6 MG: 8.6 TABLET, FILM COATED ORAL at 11:56

## 2018-01-09 RX ADMIN — DEXAMETHASONE SODIUM PHOSPHATE 10 MG: 10 INJECTION INTRAMUSCULAR; INTRAVENOUS at 08:08

## 2018-01-09 RX ADMIN — MIDAZOLAM HYDROCHLORIDE 2 MG: 1 INJECTION, SOLUTION INTRAMUSCULAR; INTRAVENOUS at 07:35

## 2018-01-09 RX ADMIN — GLYCOPYRROLATE 0.2 MG: 0.2 INJECTION, SOLUTION INTRAMUSCULAR; INTRAVENOUS at 09:50

## 2018-01-09 RX ADMIN — ROPIVACAINE HYDROCHLORIDE 15 ML: 5 INJECTION, SOLUTION EPIDURAL; INFILTRATION; PERINEURAL at 07:44

## 2018-01-09 RX ADMIN — ROPIVACAINE HYDROCHLORIDE 8 ML/HR: 2 INJECTION, SOLUTION EPIDURAL; INFILTRATION at 10:06

## 2018-01-09 RX ADMIN — DOCUSATE SODIUM 100 MG: 100 CAPSULE, LIQUID FILLED ORAL at 11:56

## 2018-01-09 RX ADMIN — ROCURONIUM BROMIDE 40 MG: 10 INJECTION INTRAVENOUS at 08:08

## 2018-01-09 RX ADMIN — CEFAZOLIN SODIUM 3000 MG: 1 INJECTION, POWDER, FOR SOLUTION INTRAMUSCULAR; INTRAVENOUS at 23:59

## 2018-01-09 RX ADMIN — FENTANYL CITRATE 25 MCG: 50 INJECTION INTRAMUSCULAR; INTRAVENOUS at 10:31

## 2018-01-09 RX ADMIN — LIDOCAINE HYDROCHLORIDE 50 MG: 10 INJECTION, SOLUTION INFILTRATION; PERINEURAL at 08:01

## 2018-01-09 RX ADMIN — PROPOFOL 200 MG: 10 INJECTION, EMULSION INTRAVENOUS at 08:01

## 2018-01-09 RX ADMIN — FENTANYL CITRATE 25 MCG: 50 INJECTION INTRAMUSCULAR; INTRAVENOUS at 10:28

## 2018-01-09 RX ADMIN — FENTANYL CITRATE 50 MCG: 50 INJECTION, SOLUTION INTRAMUSCULAR; INTRAVENOUS at 07:35

## 2018-01-09 RX ADMIN — ROPIVACAINE HYDROCHLORIDE 5 ML: 5 INJECTION, SOLUTION EPIDURAL; INFILTRATION; PERINEURAL at 10:42

## 2018-01-09 RX ADMIN — SUCCINYLCHOLINE CHLORIDE 120 MG: 20 INJECTION, SOLUTION INTRAMUSCULAR; INTRAVENOUS at 08:01

## 2018-01-09 RX ADMIN — HYDROMORPHONE HYDROCHLORIDE 0.25 MG: 1 INJECTION, SOLUTION INTRAMUSCULAR; INTRAVENOUS; SUBCUTANEOUS at 10:40

## 2018-01-09 RX ADMIN — NEOSTIGMINE METHYLSULFATE 3 MG: 1 INJECTION, SOLUTION INTRAMUSCULAR; INTRAVENOUS; SUBCUTANEOUS at 09:50

## 2018-01-09 RX ADMIN — OXYCODONE HYDROCHLORIDE 10 MG: 10 TABLET ORAL at 15:59

## 2018-01-09 RX ADMIN — HYDROMORPHONE HYDROCHLORIDE 0.25 MG: 1 INJECTION, SOLUTION INTRAMUSCULAR; INTRAVENOUS; SUBCUTANEOUS at 10:35

## 2018-01-09 RX ADMIN — CEFAZOLIN SODIUM 3000 MG: 1 INJECTION, POWDER, FOR SOLUTION INTRAMUSCULAR; INTRAVENOUS at 15:59

## 2018-01-09 RX ADMIN — FENTANYL CITRATE 25 MCG: 50 INJECTION INTRAMUSCULAR; INTRAVENOUS at 10:25

## 2018-01-09 RX ADMIN — KETOROLAC TROMETHAMINE 30 MG: 30 INJECTION, SOLUTION INTRAMUSCULAR at 09:45

## 2018-01-09 NOTE — MISCELLANEOUS
Message   Recorded as Task   Date: 10/04/2016 07:49 AM, Created By: Sayra Stein   Task Name: Follow Up   Assigned To: Ed Peraza procedure,Team   Regarding Patient: Maryana Castañeda, Status: Active   CommentMaximiliano Snowor - 04 Oct 2016 7:49 AM     TASK CREATED  Pt is S/P RIGHT L3-L4 TFESI on 09/27/2016 by Dr Huber Greek   No pain diary scanned in  No F/U scheduled   Yarely Malagonline - 04 Oct 2016 8:33 AM     TASK EDITED   spoke to pt he got relief like 95 percent and his pain level is a 0 he just feels tenderness but he will call office if any pain occurs   Federico Lin - 04 Oct 2016 10:37 AM     TASK REPLIED TO: Previously Assigned To Federico Lin md aware        Active Problems    1  Chronic low back pain (724 2,338 29) (M54 5,G89 29)   2  DM (diabetes mellitus screen) (V77 1) (Z13 1)   3  Erectile dysfunction (607 84) (N52 9)   4  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 3) (M47 816)   5  Herniation of lumbar intervertebral disc with radiculopathy (722 10) (M51 16)   6  Impaired fasting glucose (790 21) (R73 01)   7  Lumbar spinal stenosis (724 02) (M48 06)   8  Morbid obesity (278 01) (E66 01)   9  Need for prophylactic vaccination and inoculation against influenza (V04 81) (Z23)   10  Need for Tdap vaccination (V06 1) (Z23)   11  Obstructive sleep apnea (327 23) (G47 33)   12  Right hip pain (719 45) (M25 551)   13  Screen for colon cancer (V76 51) (Z12 11)   14  Screening cholesterol level (V77 91) (Z13 220)   15  Screening PSA (prostate specific antigen) (V76 44) (Z12 5)    Current Meds   1  Aspirin  MG Oral Tablet Delayed Release; Therapy: (Recorded:11Aug2014) to Recorded   2  Cialis 20 MG Oral Tablet; take 1 tablet 1 hour before activity as needed; Therapy: 52AAB5364 to (Evaluate:10Aug2017)  Requested for: 82Xyp1200; Last   Rx:89Bqf4794 Ordered   3   Naproxen 500 MG Oral Tablet; TAKE 1-2 TABLET EVERY 12 HOURS AS NEEDED FOR   PAIN;   Therapy: 61IOB6141 to (Evaluate:93Nnu9985) Requested for: 91Hof1469; Last   Rx:25Xni1237 Ordered   4  Phentermine HCl - 15 MG Oral Capsule; TAKE 1 CAPSULE EVERY MORNING BEFORE   BREAKFAST; Therapy: 93YXW7548 to (Evaluate:40Una1861); Last Rx:54Lmw9076 Ordered    Allergies    1   No Known Drug Allergies    Signatures   Electronically signed by : Benito Jeffries, ; Oct  4 2016 12:21PM EST                       (Author)

## 2018-01-09 NOTE — OP NOTE
OPERATIVE REPORT  PATIENT NAME: Jason Koo    :  1955  MRN: [id number]  Pt Location:  OR ROOM 15    SURGERY DATE: 2018     SURGEON: Cassandra Merritt MD     ASSISTANT: Hardie Phoenix PA-C     NOTE: Hardie Phoenix PA-C was present throughout the entire procedure and performed essential assistance with patient prepping, draping, positioning, suture management, wound closure, sterile dressing application and sling application, all under my direct supervision  RESIDENT ASSITANT: Millie Dwyer MD PGY-2 Assisted in the procedure  Pablo Garcia MD, was present for the entire procedure and was scrubbed for and performed all the key and essential components of the procedure  PREOPERATIVE DIAGNOSIS:  Right Shoulder Glenohumeral Osteoarthritis    POSTOPERATIVE DIAGNOSIS: Same     PROCEDURES: Right Total Shoulder Arthroplasty with long head biceps tenodesis    ANESTHESIA STAFF: Erich Hardin MD     ANESTHESIA TYPE: General with endotracheal tube with interscalene block and catheter placement    COMPLICATIONS: None    FINDINGS: Glenohumeral Osteoarthritis    SPECIMEN(S):  None    ESTIMATED BLOOD LOSS: Minimal    INDICATIONS FOR PROCEDURE:  The patient is a 58 y o  male presenting with pain and lack of function secondary to glenohumeral osteoarthritis of the right shoulder  After a thorough discussion of the risks and benefits of operative and nonoperative care the patient elected for right total shoulder arthroplasty  Informed consent was obtained in the office  OPERATIVE TECHNIQUE:  The day of surgery I identified the patient's right shoulder and marked it with my initials  The patient was taken back to the operating room where endotracheal tube with interscalene block and catheter placement  was placed by the anesthesia staff without complication  The patient was placed in the beachchair position with all bony prominences padded   The right shoulder was prepped and draped in routine sterile fashion and after a time-out for safety and confirming 3 grams of IV Cefazolin were given, a standard deltopectoral approach was performed  The dissection was carried down to the subscapularis and subscapularis peel was preformed  The long head of biceps had severe tenosynovitis and degeneration, so it was released and tagged for later tenodesis to the anterior humerus at the end of the case  The humeral head was exposed and found to have severe degenerative change with an intact rotator cuff  The humerus was prepared keeping with the surgical technique for a Tornier Flex prosthesis  After preparing the humerus the glenoid was exposed and found to have a B2 wear pattern which was correctable with eccentric anterior reaming of 5 mm  The glenoid was  prepared for an Tornier Perform glenoid component (Cortiloc Pegged Size M35)  After confirming appropriate size and version the final gelnoid component was press fit centrally and peripheral pegs were cemented  The humerus was then finished and a size 5B with a 54 x 21 mm size eccentric head was trialed and found to have excellent stability with full range of motion and appropriate soft tissue tension  Final implants were placed and the area was irrigated with pulse lavage  The subscapularis was repaired to the anterior humerus and the long head biceps was tenodesed to the anterior humerus with Orthocord sutures  The deltopectoral interval was loosely closed with 0 Vicryl and the subdermal layer with 2-0 Vicryl with staples for skin  Sterile dressings and a sling with abduction pillow was placed and the patient was awoken  The patient was transported to the recovery room in good condition and will be admitted for post-operative care and physical therapy will be initiated following the standard total shoulder arthroplasty protocol      SIGNATURE: Tyson Cruz MD  DATE: January 9, 2018  TIME: 9:40 AM

## 2018-01-09 NOTE — ANESTHESIA PREPROCEDURE EVALUATION
Review of Systems/Medical History  Patient summary reviewed  Chart reviewed  No history of anesthetic complications     Cardiovascular  EKG reviewed, Negative cardio ROS Exercise tolerance: good,     Pulmonary  Sleep apnea CPAP, ,        GI/Hepatic  Negative GI/hepatic ROS          Negative  ROS        Endo/Other  Negative endo/other ROS      GYN       Hematology  Negative hematology ROS      Musculoskeletal  Obesity (BMI 43)  morbid obesity,        Neurology  Negative neurology ROS      Psychology   Negative psychology ROS          Physical Exam    Airway    Mallampati score: II  TM Distance: >3 FB  Neck ROM: full     Dental   No notable dental hx     Cardiovascular  Comment: Negative ROS,     Pulmonary      Other Findings    11/29/17 labs - AiC 4 9, BMP, CBC unremarkable    Anesthesia Plan  ASA Score- 3     Anesthesia Type- general and regional with ASA Monitors  Additional Monitors:   Airway Plan: ETT  Comment: General+ IS catheter for post op pain control  Plan Factors-    Induction- intravenous  Postoperative Plan-     Informed Consent- Anesthetic plan and risks discussed with patient  I personally reviewed this patient with the CRNA  Discussed and agreed on the Anesthesia Plan with the CRNA  Farzad Dawn

## 2018-01-09 NOTE — DISCHARGE INSTRUCTIONS
Reason for admission: Right shoulder arthritis  Diagnosis: Right shoulder arthritis  Procedure: Right total shoulder arthroplasty    Orthopedic Instructions:   1) Keep Incision clean and dry  2) you may shower on post-operative day #4 to clean the incision with regular soap and water  Pat Dry  Apply new clean bandage  If at any point, the dressing becomes saturated, please remove and apply new bandage  3) you must wear sling at all times, except for personal hygiene, physical therapy, elbow/wrist/hand range of motion  4) no bearing weight on the operative arm - including using the arm to push out of bed, couch, car, bath, chair, etc  5) You may take Tylenol as needed for your pain (please refer to the bottle for instructions); for severe pain, you may take 1-2 tablets of Oxycodone every 4-6 hours if needed      Surgical Follow up:  Dr Fide Agee  If you have any questions about your appointment date and time, please call office at [phone]

## 2018-01-09 NOTE — ANESTHESIA POSTPROCEDURE EVALUATION
Post-Op Assessment Note      CV Status:  Stable    Mental Status:  Awake and alert    Hydration Status:  Stable    PONV Controlled:  Controlled    Airway Patency:  Patent    Post Op Vitals Reviewed: Yes          Staff: Anesthesiologist, CRNA           BP   133/75   Temp   96 9   Pulse  56   Resp   18   SpO2   98

## 2018-01-09 NOTE — SOCIAL WORK
Pt new admit to the floor  CM met with patient and explained cm role  Pt alert and oriented  Pt reports he lives in a 2 story home w/wife [address] w/1-2 castillo  Pt reports being independent PTA, reports good support at home, he drives and has transport home w/wife for d/c  Pt denies DME, VNA, and rehab  Pts pharmacy is [facility]  No POA  Pt denies hx/admission for drugs/etoh and pysch/mental health  Pt states he is set up for Outpt PT @ [facility] on 1/16/2018 @ 10am     CM reviewed d/c planning process including the following: identifying help at home, patient preference for d/c planning needs, Discharge Lounge, [facility] Meds to Bed program, availability of treatment team to discuss questions or concerns patient and/or family may have regarding understanding medications and recognizing signs and symptoms once discharged  CM also encouraged patient to follow up with all recommended appointments after discharge  Patient advised of importance for patient and family to participate in managing patients medical well being

## 2018-01-09 NOTE — PROGRESS NOTES
Pt has moderate to large amt of bloody drainage coming from bandage  And gown has been changed x2 now  Dressing reinforced with ABD and ortho paged x2  Family very concerned about amount of drainage, explained that drainage is normal and we will just watch patient and monitor for symptoms    Family said they would feel more comfortable with a doctor coming to assess

## 2018-01-09 NOTE — ANESTHESIA PROCEDURE NOTES
Peripheral Block    Patient location during procedure: holding area  Start time: 1/9/2018 7:35 AM  Reason for block: at surgeon's request and post-op pain management  Staffing  Anesthesiologist: Cary Correia  Resident/CRNA: Bethany Serna  Performed: anesthesiologist   Preanesthetic Checklist  Completed: patient identified, site marked, surgical consent, pre-op evaluation, timeout performed, IV checked, risks and benefits discussed and monitors and equipment checked  Peripheral Block  Patient position: sitting  Prep: ChloraPrep and site prepped and draped  Patient monitoring: heart rate, continuous pulse ox and frequent blood pressure checks  Block type: interscalene  Laterality: right  Injection technique: catheter and single-shot  Procedures: ultrasound guided  ultrasound permanent image saved    Local infiltration: ropivacaine (with epi 1:400K)  Infiltration strength: 0 5 %  Dose: 15 mL  Needle  Needle type: Stimuplex   Needle gauge: 22 G  Needle length: 5 cm  Needle localization: ultrasound guidance  Needle insertion depth: 6 cm  Catheter type: open end  Assessment  Injection assessment: incremental injection, local visualized surrounding nerve on ultrasound and transient paresthesias  Paresthesia pain: none  Heart rate change: no  Slow fractionated injection: yes  Post-procedure:  sterile dressing applied  patient tolerated the procedure well with no immediate complications  Additional Notes  Very small, deep nerve target

## 2018-01-09 NOTE — ANESTHESIA POSTPROCEDURE EVALUATION
Post-Op Assessment Note      CV Status:  Stable    Mental Status:  Alert and awake    Hydration Status:  Euvolemic    PONV Controlled:  Controlled    Airway Patency:  Patent    Post Op Vitals Reviewed: Yes          Staff: Anesthesiologist, CRNA       Comments: Pain now 5/10 "in shoulder" after IV analgesia - additional 5 ml ropivacaine 0 5% bolus given through catheter  Additional IV meds available but pain overall improving  Pt wide awake and alert despite IV opiates          10:40  BP   135/80   Temp     Pulse 57   Resp 14   SpO2 92%

## 2018-01-10 ENCOUNTER — GENERIC CONVERSION - ENCOUNTER (OUTPATIENT)
Dept: OTHER | Facility: OTHER | Age: 63
End: 2018-01-10

## 2018-01-10 VITALS
HEART RATE: 70 BPM | SYSTOLIC BLOOD PRESSURE: 124 MMHG | WEIGHT: 315 LBS | RESPIRATION RATE: 18 BRPM | OXYGEN SATURATION: 95 % | HEIGHT: 69 IN | TEMPERATURE: 97.6 F | DIASTOLIC BLOOD PRESSURE: 82 MMHG | BODY MASS INDEX: 46.65 KG/M2

## 2018-01-10 PROBLEM — Z96.611 STATUS POST TOTAL SHOULDER ARTHROPLASTY, RIGHT: Status: ACTIVE | Noted: 2018-01-10

## 2018-01-10 PROBLEM — M19.011 OSTEOARTHRITIS OF RIGHT GLENOHUMERAL JOINT: Status: RESOLVED | Noted: 2018-01-09 | Resolved: 2018-01-10

## 2018-01-10 LAB
ANION GAP SERPL CALCULATED.3IONS-SCNC: 8 MMOL/L (ref 4–13)
BUN SERPL-MCNC: 19 MG/DL (ref 5–25)
CALCIUM SERPL-MCNC: 8.5 MG/DL (ref 8.3–10.1)
CHLORIDE SERPL-SCNC: 102 MMOL/L (ref 100–108)
CO2 SERPL-SCNC: 25 MMOL/L (ref 21–32)
CREAT SERPL-MCNC: 1.01 MG/DL (ref 0.6–1.3)
ERYTHROCYTE [DISTWIDTH] IN BLOOD BY AUTOMATED COUNT: 12.3 % (ref 11.6–15.1)
GFR SERPL CREATININE-BSD FRML MDRD: 79 ML/MIN/1.73SQ M
GLUCOSE SERPL-MCNC: 128 MG/DL (ref 65–140)
HCT VFR BLD AUTO: 36.3 % (ref 36.5–49.3)
HGB BLD-MCNC: 13.2 G/DL (ref 12–17)
MCH RBC QN AUTO: 30.9 PG (ref 26.8–34.3)
MCHC RBC AUTO-ENTMCNC: 36.4 G/DL (ref 31.4–37.4)
MCV RBC AUTO: 85 FL (ref 82–98)
PLATELET # BLD AUTO: 157 THOUSANDS/UL (ref 149–390)
PMV BLD AUTO: 11.8 FL (ref 8.9–12.7)
POTASSIUM SERPL-SCNC: 3.8 MMOL/L (ref 3.5–5.3)
RBC # BLD AUTO: 4.27 MILLION/UL (ref 3.88–5.62)
SODIUM SERPL-SCNC: 135 MMOL/L (ref 136–145)
WBC # BLD AUTO: 9.68 THOUSAND/UL (ref 4.31–10.16)

## 2018-01-10 PROCEDURE — G8987 SELF CARE CURRENT STATUS: HCPCS

## 2018-01-10 PROCEDURE — 97166 OT EVAL MOD COMPLEX 45 MIN: CPT

## 2018-01-10 PROCEDURE — G8988 SELF CARE GOAL STATUS: HCPCS

## 2018-01-10 PROCEDURE — 80048 BASIC METABOLIC PNL TOTAL CA: CPT | Performed by: ORTHOPAEDIC SURGERY

## 2018-01-10 PROCEDURE — 97161 PT EVAL LOW COMPLEX 20 MIN: CPT

## 2018-01-10 PROCEDURE — G8978 MOBILITY CURRENT STATUS: HCPCS

## 2018-01-10 PROCEDURE — 85027 COMPLETE CBC AUTOMATED: CPT | Performed by: ORTHOPAEDIC SURGERY

## 2018-01-10 PROCEDURE — G8979 MOBILITY GOAL STATUS: HCPCS

## 2018-01-10 PROCEDURE — G8980 MOBILITY D/C STATUS: HCPCS

## 2018-01-10 PROCEDURE — 97535 SELF CARE MNGMENT TRAINING: CPT

## 2018-01-10 RX ORDER — ACETAMINOPHEN 325 MG/1
TABLET ORAL
Qty: 30 TABLET | Refills: 0 | Status: SHIPPED | OUTPATIENT
Start: 2018-01-10 | End: 2018-03-26 | Stop reason: ALTCHOICE

## 2018-01-10 RX ORDER — DOCUSATE SODIUM 100 MG/1
100 CAPSULE, LIQUID FILLED ORAL 2 TIMES DAILY
Qty: 20 CAPSULE | Refills: 0 | Status: SHIPPED | OUTPATIENT
Start: 2018-01-10 | End: 2018-03-26 | Stop reason: ALTCHOICE

## 2018-01-10 RX ADMIN — SENNOSIDES 8.6 MG: 8.6 TABLET, FILM COATED ORAL at 09:00

## 2018-01-10 RX ADMIN — DOCUSATE SODIUM 100 MG: 100 CAPSULE, LIQUID FILLED ORAL at 09:00

## 2018-01-10 NOTE — PLAN OF CARE
Problem: OCCUPATIONAL THERAPY ADULT  Goal: Performs self-care activities at highest level of function for planned discharge setting  See evaluation for individualized goals  Treatment Interventions: ADL retraining, Patient/family training, Equipment evaluation/education, Compensatory technique education, Continued evaluation, Energy conservation, Activityengagement          See flowsheet documentation for full assessment, interventions and recommendations  Limitation: Decreased ADL status, Decreased UE ROM, Decreased UE strength, Decreased endurance, Decreased self-care trans, Decreased high-level ADLs  Prognosis: Good  Assessment: Pt is a 59 y/o male seen for OT eval s/p adm to [facility] fpr Right Total Shoulder Arthroplasty with long head biceps tenodesis performed on 1/9/2018  Pt with active OT orders, NWB R UE and out of bedorders  Pt lives with his wife in a 2 story home w/ 1 TISH  Pt was I w/  ADLS and IADLS, drove, & required no use of DME PTA  Pt is currently demonstrating the following occupational deficits: max A UB ADLS, mod A LB ADLS, S functional transfers and S functional mobility without AD  Pt with deficits and limitations in all baseline areas of occupation 2* pain, decreased endurance/activity tolerance, decreased functional forward reach, de creased functional use of R UE, decreased ADL status, orthopedic restrictions,and NWB R UE  The following Occupational Performance Areas to address include: grooming, bathing/shower, toilet hygiene, dressing, functional mobility, community mobility, clothing management, meal prep, household maintenance and job performance/volunteering  Pt scored overall 70/100 on the Barthel Index  Based on the aforementioned OT evaluation, functional performance deficits, and assessments, pt has been identified as a moderate complexity evaluation  Anticipate pt will D/C home w/ increased family support once medically stable   Pt to continue to benefit from acute immediate OT services to address the following goals 3-5/wk or until goals are met and are to  w/in 7-10 days:     OT Discharge Recommendation: Home with family support  OT - OK to Discharge: Yes    8:15am-8:35am OT Tx session:    Pt participated in additional OT tx session focusing on dressing, don/doffing of abduction sling, education on precautions, education on activity engagement while maintaining precautions  Pt performed UB dressing of pull over shirt w/ mod A w/ A to thread R UE and A to pull shirt around back  Pt able to maintain precautions t/o UB dressing task  Pt educated on donning/doffing abduction sling  Pt able to verbalize to therapist how to don/doff sling and was able to perfrom w/ A from therapist  Pt performed LB dressing w/ min A to thread R LE and SBA for safety to pull up pants over hips in standing  Educated pt regarding showering and toilet hygiene while maintaining precautions  Pt verbalizes understanding regarding all education and reports no concerns for D/C at this time         Kaitlin Gould MS, OTR/L

## 2018-01-10 NOTE — PROGRESS NOTES
Orthopedics   Jesika [place] 58 y o  male MRN: [id number]  Unit/Bed#: CW3 340-01      Subjective:  62 y o male post operative day 1 right total shoulder arthroplasty  Patient doing well  Pain controlled  Labs:    0  Lab Value Date/Time   HCT 36 3 (L) 01/10/2018 0451   HGB 13 2 01/10/2018 0451   WBC 9 68 01/10/2018 0451       Meds:    Current Facility-Administered Medications:     acetaminophen (TYLENOL) tablet 650 mg, 650 mg, Oral, Q4H PRN, Danny Gonzalez MD, 650 mg at 01/09/18 1709    docusate sodium (COLACE) capsule 100 mg, 100 mg, Oral, BID, Danny Gonzalez MD, 100 mg at 01/09/18 1709    lactated ringers infusion, 125 mL/hr, Intravenous, Continuous, Shellie Curry MD, Stopped at 01/09/18 1104    lactated ringers infusion, 1 5 mL/kg/hr, Intravenous, Continuous, Danny Gonzalez MD, Last Rate: 198 mL/hr at 01/09/18 1105, 1 5 mL/kg/hr at 01/09/18 1105    morphine (PF) 10 mg/mL injection 2 mg, 2 mg, Intravenous, Q1H PRN, Danny Gonzalez MD    ondansetron (ZOFRAN) injection 4 mg, 4 mg, Intravenous, Q6H PRN, Danny Gonzalez MD    oxyCODONE (ROXICODONE) immediate release tablet 10 mg, 10 mg, Oral, Q4H PRN, Danny Gonzalez MD, 10 mg at 01/09/18 2118    oxyCODONE (ROXICODONE) IR tablet 5 mg, 5 mg, Oral, Q4H PRN, Danny Gonzalez MD    ropivacaine (NAROPIN) 0 2 % 600 mL in elastomeric reservoir continuous peripheral nerve block, 8 mL/hr, Subcutaneous, Continuous, Shellie Curry MD, Last Rate: 8 mL/hr at 01/09/18 1006, 8 mL/hr at 01/09/18 1006    senna (SENOKOT) tablet 8 6 mg, 1 tablet, Oral, Daily, Danny Gonzalez MD, 8 6 mg at 01/09/18 1156    Blood Culture:   No results found for: BLOODCX    Wound Culture:   No results found for: WOUNDCULT    Ins and Outs:  I/O last 24 hours: In: 4400 [P O :1800; I V :2500; IV Piggyback:100]  Out: 2050 [Urine:2000;  Blood:50]          Physical:  Vitals:    01/10/18 0314   BP: 139/78   Pulse: 83   Resp: 18   Temp: 98 4 °F (36 9 °C)   SpO2: 96%     right upper extremity  · Dressings with some bleeding noted under mepilex, intact  · Sensation intact to axillary, musculocutaneous, radial, ulna, median nerves  · Motor intact to axillary, musculocutaneous, radial, ulna, median nerves  · 2+ Radial pulse    _*_*_*_*_*_*_*_*_*_*_*_*_*_*_*_*_*_*_*_*_*_*_*_*_*_*_*_*_*_*_*_*_*_*_*_*_*_*_*_*_*    Assessment: 62 y o male post operative day 1 right total shoulder arthroplasty   Doing well    Plan:  · Nonweight Bearing right upper extremity  · Up and out of bed  · Sling for Comfort, may remove for pendulums and hygiene  · DVT prophylaxis  · Ice and analgesics  · Will continue to assess for acute blood loss anemia      Anshul Merline Boon, MD

## 2018-01-10 NOTE — CASE MANAGEMENT
Initial Clinical Review    Age/Sex: 58 y o  male admitted on 1/9 for elective surgery - OR    Surgery Date: 1/9      Procedure: S/P TOTAL SHOULDER ARTHROPLASTY (Right Shoulder)    Anesthesia: General, Regional    Admission Orders: Date/Time/Statement: Inpatient 1/9/18 @ 108 Med Surg    Orders Placed This Encounter   Procedures    Inpatient Admission     Standing Status:   Standing     Number of Occurrences:   1     Order Specific Question:   Admitting Physician     Answer:   Mateusz Cheng [[id number]]     Order Specific Question:   Level of Care     Answer:   Med Surg [16]     Order Specific Question:   Estimated length of stay     Answer:   Inpatient Only Surgery       Vital Signs: /82   Pulse 70   Temp 97 6 °F (36 4 °C) (Oral)   Resp 18   Ht 5' 9" (1 753 m)   Wt (!) 146 kg (322 lb 5 oz)   SpO2 95%   BMI 47 60 kg/m²     Diet:        Diet Orders            Start     Ordered    01/09/18 1112  Diet Regular; Regular House  Diet effective now     Question Answer Comment   Diet Type Regular    Regular Regular House    RD to adjust diet per protocol?  Yes        01/09/18 1111          Mobility: OOB  PT/OT eval and treat    DVT Prophylaxis: Sequential compression device    Scheduled Meds:  Cefazolin  Intravenous x4   docusate sodium 100 mg Oral BID   senna 1 tablet Oral Daily     Continuous Infusions:  lactated ringers 125 mL/hr Last Rate: Stopped (01/09/18 1104)   ropivacaine (Ambu ACTion BLOCK PUMP) continuous peripheral nerve block 8 mL/hr Last Rate: 8 mL/hr (01/09/18 1006)     PRN Meds:   acetaminophen    morphine injection    ondansetron    oxyCODONE po x3

## 2018-01-10 NOTE — DISCHARGE SUMMARY
ORTHOPEDICS DISCHARGE SUMMARY  Main Epstein 58 y o  male MRN: [id number]  Unit/Bed#: CW3 340-01    Attending Physician: Augie Tristan    Admitting diagnosis: Primary osteoarthritis of right shoulder [M19 011]    Discharge diagnosis: Primary osteoarthritis of right shoulder [M19 011]    Date of admission: 1/9/2018    Date of discharge: 01/10/18         Procedure: Right TSA    HPI:  This is a 58y o  year old male that presented to the office with signs and symptoms of right Shoulder osteoarthritis and/or other pathology  They tried and failed conservative treatment measures and wished to proceed with surgical intervention  The risks, benefits, and complications of the procedure were discussed with the patient and informed consent was obtained  Hospital Course: The patient was admitted to the hospital on 1/9/2018 and underwent an uncomplicated right total shoulder arthroplasty  They were transferred to the floor after a brief stay in the post-anesthesia care unit  Their pain was well managed with IV and oral pain medications  On discharge date pt was cleared by PT and the medicine team and determined to be safe for discharge      0  Lab Value Date/Time   HGB 13 2 01/10/2018 0451          Discharge Instructions: The patient was discharged nonweight bearing to the right upper extremity  Refrain from PT/OT until cleared by Surgeon  Take pain medications as instructed  Discharge Medications: For the complete list of discharge medications, please refer to the patient's medication reconciliation

## 2018-01-10 NOTE — OCCUPATIONAL THERAPY NOTE
OccupationalTherapy Evaluation     Patient Name: Magen Shaw  SWSDO'L Date: 1/10/2018  Problem List  Patient Active Problem List   Diagnosis    Status post total shoulder arthroplasty, right     Past Medical History  Past Medical History:   Diagnosis Date    CPAP (continuous positive airway pressure) dependence     Sleep apnea      Past Surgical History  Past Surgical History:   Procedure Laterality Date    COLONOSCOPY      OR COLONOSCOPY FLX DX W/COLLJ [facility]ly [facility] WHEN PFRMD N/A 6/27/2017    Procedure: COLONOSCOPY;  Surgeon: Jennifer Mosley MD;  [facility]; Service: Colorectal    OR RECONSTR TOTAL SHOULDER IMPLANT Right 1/9/2018    Procedure: TOTAL SHOULDER ARTHROPLASTY;  Surgeon: Stoney Dorantes MD;  [facility];  Service: Orthopedics           01/10/18 0835   Note Type   Note type Eval/Treat   Restrictions/Precautions   Weight Bearing Precautions Per Order Yes   RUE Weight Bearing Per Order NWB   Braces or Orthoses Sling  (Abduction Sling R UE )   Other Precautions WBS;Pain   Pain Assessment   Pain Assessment [facility] Pain Intervention(s) Ambulation/increased activity;Repositioned   Response to Interventions tolerated   Pain Rating: FLACC (Rest) - Face 0   Pain Rating: FLACC (Rest) - Legs 0   Pain Rating: FLACC (Rest) - Activity 0   Pain Rating: FLACC (Rest) - Cry 0   Pain Rating: FLACC (Rest) - Consolability 0   Score: FLACC (Rest) 0   Pain Rating: FLACC (Activity) - Face 0   Pain Rating: FLACC (Activity) - Legs 0   Pain Rating: FLACC (Activity) - Activity 0   Pain Rating: FLACC (Activity) - Cry 0   Pain Rating: FLACC (Activity) - Consolability 0   Score: FLACC (Activity) 0   Home Living   Type of Home House   Home Layout Two level; Able to live on main level with bedroom/bathroom   Bathroom Shower/Tub Walk-in shower   Bathroom Toilet Raised  (comfort height )   Bathroom Equipment Shower chair   Bathroom Accessibility Accessible   Additional Comments Pt lives with his wife in a 2 story home w/ 1 TISH   Prior Function   Level of [place] Independent with ADLs and functional mobility   Lives With Spouse   Receives Help From Family   ADL Assistance Independent   IADLs Independent   Vocational Full time employment   Comments Pt was I w/  ADLS and IADLS, drove, & required no use of DME PTA  Lifestyle   Autonomy Pt was I w/ ADLS/IADLS PTA    Reciprocal Relationships Pt lives with his wife who will be able to provide A upon D/C    Service to Others 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    Psychosocial   Psychosocial (WDL) [address] 5  Supervision/Setup   Grooming Assistance 5  Supervision/Setup   UB Bathing Assistance 2  Maximal Assistance   LB [address] 3  Moderate Assistance   [address] 2  Maximal Assistance    [address] 3  Moderate Assistance   [address]  4  Minimal Assistance   Bed Mobility   Additional Comments Unable to assess, pt OOB upon arrival and return to chair at end of therapy session    Transfers   Sit to [address] [facility] [address] transfer 5  Supervision   Additional items Standard toilet   Functional Mobility   Functional Mobility 5  Supervision   Additional Comments without AD   Balance   Static Sitting Good   Dynamic Sitting Fair   Static Standing Good   Dynamic Standing Fair   Ambulatory Fair +   Activity Tolerance   Activity Tolerance Patient tolerated treatment well   Nurse Made Aware RN confirm pt appropriate for OT Eval and updated post session    RUE Assessment   RUE Assessment X   LUE Assessment   LUE Assessment WFL   Hand Function   Gross Motor Coordination Functional   Fine Motor Coordination Functional   Cognition   Overall Cognitive Status WFL   Arousal/Participation Alert; Responsive; Cooperative   Attention Within functional limits   Memory Within functional limits   Following Commands Follows all commands and directions without difficulty   Assessment   Limitation Decreased ADL status; Decreased UE ROM; Decreased UE strength;Decreased endurance;Decreased self-care trans;Decreased high-level ADLs   Prognosis Good   Assessment Pt is a 57 y/o male seen for OT eval s/p adm to B fpr Right Total Shoulder Arthroplasty with long head biceps tenodesis performed on 2018  Pt with active OT orders, NWB R UE and out of bedorders  Pt lives with his wife in a 2 story home w/ 1 TISH  Pt was I w/  ADLS and IADLS, drove, & required no use of DME PTA  Pt is currently demonstrating the following occupational deficits: max A UB ADLS, mod A LB ADLS, S functional transfers and S functional mobility without AD  Pt with deficits and limitations in all baseline areas of occupation 2* pain, decreased endurance/activity tolerance, decreased functional forward reach, de creased functional use of R UE, decreased ADL status, orthopedic restrictions,and NWB R UE  The following Occupational Performance Areas to address include: grooming, bathing/shower, toilet hygiene, dressing, functional mobility, community mobility, clothing management, meal prep, household maintenance and job performance/volunteering  Pt scored overall 70/100 on the Barthel Index  Based on the aforementioned OT evaluation, functional performance deficits, and assessments, pt has been identified as a moderate complexity evaluation  Anticipate pt will D/C home w/ increased family support once medically stable  Pt to continue to benefit from acute immediate OT services to address the following goals 3-5/wk or until goals are met and are to  w/in 7-10 days:   Goals   Patient Goals to go home   Plan   Treatment Interventions ADL retraining;Patient/family training;Equipment evaluation/education; Compensatory technique education;Continued evaluation; Energy conservation; Activityengagement   Goal Expiration Date 18   OT Frequency 3-5x/wk   Additional Treatment Session   Start Time 0815   End Time 5074   Treatment Assessment Pt participated in additional OT tx session focusing on dressing, don/doffing of abduction sling, education on precautions, education on activity engagement while maintaining precautions  Pt performed UB dressing of pull over shirt w/ mod A w/ A to thread R UE and A to pull shirt around back  Pt able to maintain precautions t/o UB dressing task  Pt educated on donning/doffing abduction sling  Pt able to verbalize to therapist how to don/doff sling and was able to perfrom w/ A from therapist  Pt performed LB dressing w/ min A to thread R LE and SBA for safety to pull up pants over hips in standing  Educated pt regarding showering and toilet hygiene while maintaining precautions  Pt verbalizes understanding regarding all education and reports no concerns for D/C at this time      Additional Treatment Day 1   Recommendation   OT Discharge Recommendation Home with family support   OT - OK to Discharge Yes   Barthel Index   Feeding 5   Bathing 0   Grooming Score 0   Dressing Score 5   Bladder Score 10   Bowels Score 10   Toilet Use Score 10   Transfers (Bed/Chair) Score 15   Mobility (Level Surface) Score 15   Stairs Score 0   Barthel Index Score 70   Modified Morrill Scale   Modified Morrill Scale 2       GOALS:    1) Pt will improve activity tolerance to G for min 30 min txment sessions  2) Pt will complete ADLs/self care w/ min A using adaptive equipment and DME as needed w/ G hyiene/thoroughness   3) Pt will complete toileting w/ mod I w/ G hygiene/thoroughness using DME as needed  4) Pt will demonstrate G carryover of pt/caregiver education and training as appropriate w/ mod I w/o cues w/ G tolerance  5) Pt will demonstrate 100% carryover of learned E C  techniques s/p skilled education w/o cues t/o functional ADL/ IADL/leisure interest tasks w/ mod I  6) Pt will demonstrate 100% carryover of precautions s/p review w/o cues w/ mod I w/ G tolerance/participation t/o functional ADL/IADL/leisure tasks    Dione Lord MS, OTR/L

## 2018-01-10 NOTE — PROGRESS NOTES
Patient refused 5mg dose of oxycodone prior to discharge  Patient no longer in system and was unable to return dose  Pharmacy aware  Will return to pharmacy and they will return dose and cosign my return

## 2018-01-10 NOTE — CONSULTS
Anesthesia Progress Note - [facility] Pain Management    Sanjeev Sales MRN: [id number]  Unit/Bed#: CW3 340-01 Encounter: [id number]    SURGERY DATE: 1/9/2018  Post-Op Diagnosis Codes:     * Primary osteoarthritis of right shoulder [M19 011]    Assessment:   58 y o  male STATUS POST   Procedure(s):  TOTAL SHOULDER ARTHROPLASTY  POD# 1    Plan:   Pain well controlled with LA infusion ball  Patient re-educated regarding duration, expected side effects, and catheter removal   Instructed to call hospital and ask for anesthesiologist on-call with any questions  [phone][facility] with any questions    Subjective:    Patient states pain well controlled overnight  Minimal discomfort  No side effects noted  No SOB/dyspnea  Denies paresthesia numbness      Meds/Allergies   current meds:   Current Facility-Administered Medications   Medication Dose Route Frequency    acetaminophen (TYLENOL) tablet 650 mg  650 mg Oral Q4H PRN    docusate sodium (COLACE) capsule 100 mg  100 mg Oral BID    lactated ringers infusion  125 mL/hr Intravenous Continuous    lactated ringers infusion  1 5 mL/kg/hr Intravenous Continuous    morphine (PF) 10 mg/mL injection 2 mg  2 mg Intravenous Q1H PRN    ondansetron (ZOFRAN) injection 4 mg  4 mg Intravenous Q6H PRN    oxyCODONE (ROXICODONE) immediate release tablet 10 mg  10 mg Oral Q4H PRN    oxyCODONE (ROXICODONE) IR tablet 5 mg  5 mg Oral Q4H PRN    ropivacaine (NAROPIN) 0 2 % 600 mL in elastomeric reservoir continuous peripheral nerve block  8 mL/hr Subcutaneous Continuous    senna (SENOKOT) tablet 8 6 mg  1 tablet Oral Daily       No Known Allergies    Objective     Temp:  [96 9 °F (36 1 °C)-98 4 °F (36 9 °C)] 97 6 °F (36 4 °C)  HR:  [] 70  Resp:  [12-18] 18  BP: (124-160)/(73-92) 124/82    Physical Exam:  Gen: well appearing, NAD   CV: WWP  Pulm: normal chest excursion bilaterally  Neuro: grossly non-focal   Catheter Site: clean, dry and dressing intact    SIGNATURE: Lona Fabian MD  DATE: January 10, 2018  TIME: 8:23 AM

## 2018-01-10 NOTE — PHYSICAL THERAPY NOTE
Physical Therapy Evaluation    Patient's Name: Ruby Quarles    Admitting Diagnosis  Primary osteoarthritis of right shoulder [M19 011]    Problem List  Patient Active Problem List   Diagnosis    Status post total shoulder arthroplasty, right       Past Medical History  Past Medical History:   Diagnosis Date    CPAP (continuous positive airway pressure) dependence     Sleep apnea        Past Surgical History  Past Surgical History:   Procedure Laterality Date    COLONOSCOPY      ID COLONOSCOPY FLX DX W/COLLJ [facility]ly [facility]HABIL[facility]ATION WHEN PFRMD N/A 6/27/2017    Procedure: COLONOSCOPY;  Surgeon: Tommie Littlejohn MD;  [facility]; Service: Colorectal    ID RECONSTR TOTAL SHOULDER IMPLANT Right 1/9/2018    Procedure: TOTAL SHOULDER ARTHROPLASTY;  Surgeon: Omkar Herron MD;  [facility];  Service: Orthopedics      01/10/18 1115   Note Type   Note type Eval only   Pain Assessment   Pain Assessment No/denies pain   Pain Score No Pain   Home Living   Type of [address] Two level   Bathroom Shower/Tub Walk-in shower   Bathroom Toilet Raised   Bathroom Equipment Shower chair   Bathroom Accessibility Accessible   Additional Comments Pt denies any use of DME PTA   Prior Function   Level of [place] Independent with ADLs and functional mobility   Lives With Spouse   Receives Help From Family   ADL Assistance Independent   IADLs Independent   Falls in the last 6 months 0   Vocational On disability   Comments Pt drives   He has supportive family who can assist with d/c needs   Restrictions/Precautions   Weight Bearing Precautions Per Order Yes   RUE Weight Bearing Per Order NWB   Braces or Orthoses Other (Comment)  (ABD sling- able to doff for cleaning and exercise)   Other Precautions WBS   General   Additional Pertinent History 01/09/18 TOTAL SHOULDER ARTHROPLASTY    Family/Caregiver Present Yes   Cognition   Overall Cognitive Status WFL   Arousal/Participation Alert   Attention Within functional limits   Orientation Level Oriented X4   Memory Within functional limits   Following Commands Follows all commands and directions without difficulty   Comments Pt able to verbalize and understand precautions thorughout session    RUE Assessment   RUE Assessment (NT- NWB in sling; WFL distal to shoulder )   LUE Assessment   LUE Assessment WFL   RLE Assessment   RLE Assessment WFL   LLE Assessment   LLE Assessment WFL   Coordination   Movements are Fluid and Coordinated 1   Sensation WFL   Light Touch   RLE Light Touch Grossly intact   LLE Light Touch Grossly intact   Proprioception   RLE Proprioception Grossly intact   LLE Proprioception Grossly Intact   Bed Mobility   Additional Comments OOB standing in room upon arrival    Transfers   Sit to Stand 7  Independent   Stand to Sit 7  Independent   Stand pivot 7  Independent   Ambulation/Elevation   Gait pattern WNL   Gait Assistance 7  Independent   Assistive Device None   Distance 150'   Stair Management Assistance 7  Independent   Additional items Verbal cues   Stair Management Technique No rails; Alternating pattern   Number of Stairs 5  ( steps)   Balance   Static Sitting Good   Dynamic Sitting Good   Static Standing Good   Dynamic Standing Good   Ambulatory Good   Endurance Deficit   Endurance Deficit No   Activity Tolerance   Activity Tolerance Patient tolerated treatment well   Medical Staff Made dk Jasmine MD; Usman Tamayo CM    Nurse Made Aware Cyndie Anderson, RENE    Assessment   Prognosis Good   Problem List Decreased strength;Decreased range of motion;Orthopedic restrictions;Pain   Assessment Pt is a 58year old male presenting s/p TOTAL SHOULDER ARTHROPLASTY 1/9/18  Pt eval performed POD #1 with NWB RUE in ABD sling  PMH includes CPAP and sleep apnea  PTA, pt fully (I) for all ADLs and (I)ADLs  Pt denies any use of DME or falls  Family is supportive and can assist with d/c needs  On eval, pt able to perform all functional tasks (I)   Pt and family and to (I) don/doff sling for exercise  Pendulum exercises and elbow, wrist and finger ROM performed during session  Handout provided  Pt safe to d c home with family support and OPPT when medically appropriate  Goals   Patient Goals to go home    Recommendation   Recommendation Outpatient PT; Home with family support   PT - OK to Discharge Yes   Additional Comments During session pt performed the following RUE therex: pendulums fwd/back/side to side/ circles clockwise and counterclockwise 20x each  Eblow flexion and extension PROM 15x, Wrist pronation/supination/flexion/extension/ deviation radial and ulnar   FingerAROM   Barthel Index   Feeding 5   Bathing 0   Grooming Score 0   Dressing Score 5   Bladder Score 10   Bowels Score 10   Toilet Use Score 10   Transfers (Bed/Chair) Score 15   Mobility (Level Surface) Score 15   Stairs Score 10   Barthel Index Score 80           Mallorie Galan, PT

## 2018-01-10 NOTE — PROGRESS NOTES
Spoke to Nolvia Barry from [employer] regarding oxycodone 5 mg tablet  Patient refused dose and was discharged before oxycodone was able to be returned to [facility] Jay De Santiago walked the tablet down to the pharmacy  One tablet of oxycodone 5 mg was returned to the pharmacy narcotic vault

## 2018-01-11 NOTE — RESULT NOTES
Verified Results  * CT SOFT TISSUE NECK W CONTRAST [id number] 06:37AM [facility]     Test Name Result Flag Reference   CT SOFT TISSUE NECK W CONTRAST (Report)     CT NECK WITH CONTRAST     INDICATION: Concern for left postauricular mass  COMPARISON: None  TECHNIQUE: Contiguous 2 5 mm images were obtained through the neck after administration of intravenous contrast       Radiation dose length product (DLP) for this visit: 836 mGy-cm   This examination, like all CT scans performed in the [facility], was performed utilizing techniques to minimize radiation dose exposure, including the use of iterative    reconstruction and automated exposure control  BB marker placed at the point of interest      IV Contrast: 85 mL of iohexol (OMNIPAQUE)          IMAGE QUALITY: Diagnostic  FINDINGS:     VISUALIZED BRAIN PARENCHYMA: No acute intracranial pathology of the visualized brain parenchyma  VISUALIZED ORBITS AND PARANASAL SINUSES: Trace mucosal thickening the right maxillary sinus  Paranasal sinuses are otherwise clear  NASAL CAVITY AND NASOPHARYNX: Normal      SUPRAHYOID NECK: Normal oral cavity, tongue base, tonsillar fossa and epiglottis  Prevertebral soft tissues are normal         INFRAHYOID NECK: Aryepiglottic folds, laryngeal tissues, and piriform sinuses are normal         THYROID GLAND: Normal      PAROTID AND SUBMANDIBULAR GLANDS: Bilateral submandibular glands are unremarkable  Parotid glands are unremarkable  There is a BB marker projecting over the posterior aspect of the superficial lobe of the left parotid gland  Small underlying lymph nodes noted likely accounting for palpable abnormality  LYMPH NODES: A few small lymph nodes seen throughout the mediastinal and cervical bertha stations  None meet criteria for pathologic enlargement or have suspicious morphology  VASCULAR STRUCTURES: Atherosclerotic carotid plaque results in mild stenoses       THORACIC INLET: Lung apices and upper mediastinum are unremarkable  BONY STRUCTURES: Normal        IMPRESSION:     No soft tissue mass or pathologic adenopathy within the neck  Small lymph nodes likely accounting for palpable abnormality in the postauricular region         Workstation performed: [id number]     Signed by:   Ly Morrissey MD   3/27/17       Signatures   Electronically signed by : KRIS Burris ; Apr 7 2017 10:23AM EST                       (Author)

## 2018-01-11 NOTE — PROGRESS NOTES
Preliminary Nursing Report                Patient Information    Initial Encounter Entry Date:   2017 11:06 AM EST (Automated Transmission Automated Transmission)       Last Modified:   {Carl Hickman}              Legal Name: [address] Number:        YOB: 1955        Age (years): 58        Gender: M        Body Mass Index (BMI): 46 kg/m2        Height: 69 in  Weight: 310 lbs (141 kgs)           Address:   [address] [place] [id number] [place]              Phone: [phone]   (consent to leave messages)        Email:        Ethnicity: Decline to State        Zoroastrianism:        Marital Status:        Preferred Language: English        Race: Other Race                    Patient Insurance Information        Primary Insurance Information Carrier Name: {Primary  CarrierName}           Carrier Address:   {Primary  CarrierAddress}              Carrier Phone: {Primary  CarrierPhone}          Group Number: {Primary  GroupNumber}          Policy Number: {Primary  PolicyNumber}          Insured Name: {Primary  InsuredName}          Insured : {Primary  InsuredDOB}          Relationship to Insured: {Primary  RelationshiptoInsured}           Secondary Insurance Information Carrier Name: {Secondary  CarrierName}           Carrier Address:   {Secondary  CarrierAddress}              Carrier Phone: {Secondary  CarrierPhone}          Group Number: {Secondary  GroupNumber}          Policy Number: {Secondary  PolicyNumber}          Insured Name: {Secondary  InsuredName}          Insured : {Secondary  InsuredDOB}          Relationship to Insured: {Secondary  RelationshiptoInsured}                       Health Profile   Booking #:   Theodoro Ramses #: [id number]               DOS: 2018    Surgery : RECONSTRUCT SHOULDER JOINT      Add'l Procedures/Notes:     Surgery Risk: Intermediate          Precautions     BMI       Obstructive sleep apnea       Palpitations Allergies    No Known Drug Allergies             Medications    Aspirin  MG Oral Tablet Delayed Release       Cialis 20 MG Oral Tablet       Krill Oil CAPS       Naproxen 500 MG Oral Tablet       Probiotic CAPS       Saxenda 18 MG/3ML Subcutaneous Solution Pen-injector               Conditions    Chronic low back pain       Chronic right shoulder pain       DM (diabetes mellitus screen)       Dyslipidemia       Elevated serum creatinine       Erectile dysfunction       Facet degeneration of lumbar region       Herniation of lumbar intervertebral disc with radiculopathy       Impaired fasting glucose       Localized primary osteoarthritis of right shoulder region       Lumbar spinal stenosis       Morbid obesity       Need for hepatitis C screening test       Need for prophylactic vaccination and inoculation against influenza       Need for Tdap vaccination       Obstructive sleep apnea       Palpitations       Right hip pain       Screen for colon cancer       Screening cholesterol level       Screening PSA (prostate specific antigen)       Shoulder pain, right       Submandibular lymphadenopathy               Family History    None             Surgical History    None             Social History    Assistive Devices: CPAP       Never a smoker       No illicit drug use       Social alcohol use                               Patient Instructions       Medical Procedure Risk  NPO Instructions   The day before surgery it is recommended to have a light dinner at your usual time and you are allowed a light snack early in the evening  Do not eat anything heavy or eat a big meal after 7pm  Do not eat or drink anything after midnight prior to your surgery  If you are supposed to take any of your medications, do so with a sip of water  Failure to follow these instructions can lead to an increased risk of lung complications and may result in a delay or cancellation of your procedure   If you have any questions, contact your institution for further instructions  No candy, no gum, no mints, no chewing tobacco          Aspirin  MG Oral Tablet Delayed Release  Medication Instruction (Aspirin - Blood Thinners) 112, 104, 105, 114, 113, 103, 110, 107, 108, 109, 111, 106  Please continue to take this medication on your normal schedule  If this is an oral medication and you take in the morning, you may do so with a sip of water  However, your surgeon may have you stop aspirin up to a week early if you are having intracranial, middle ear, posterior eye, spine surgery or prostate surgery  Naproxen 500 MG Oral Tablet  Medication Instruction (NSAID - Pain Medication) 61  Please stop ibuprofen, naproxen and other non-steroidal anti-inflammatory drugs (NSAIDS) for 24 hrs before surgery  Obstructive sleep apnea  Sleep Apnea   Please notify surgeon and anesthesiologist if you have sleep apnea, severe snoring, or daytime somnolence  For those sleep apnea patients with continuous positive airway pressure (CPAP or BiPAP) machines, please bring your machine to the hospital on the day of surgery  Testing Considerations       ? Complete Blood Count (CBC) t, client, client  If test was completed and normal within last six months, repeat test is not necessary  Triggered by: Palpitations, Age or Facility Rec         ? Comprehensive Metabolic Panel (CMP) t  If test was completed and normal within last six months, repeat test is not necessary  Triggered by: Palpitations         ? Electrocardiogram (ECG) t  Patient does not need new test if normal ECG is present within the last six months and no change in clinical condition  Triggered by: Morbid obesity, Obstructive sleep apnea, Palpitations, Age or Facility Rec         ? Hemoglobin A1c (HbA1c) client  If test was completed and normal within the last three months, repeat test is not necessary  Triggered by: Age or Facility Rec         ?  Type and Screen client  Type and Screen - Blood: If there is anticipated or possible large blood loss with this procedure, then a Type and Screen for Blood should be ordered  Triggered by: Age or Facility Rec               Consultations       ? Cardiac Consult (Major Rate) c  If the patient has a heart rate of greater than 100 bpm, or if the heart rhythm disturbance is new, then a cardiac consult is indicated  Otherwise, optimization of medical therapy is recommended under the direction of the PCP or cardiologist   Triggered by: Palpitations               Miscellaneous Questions         Question: Are you able to walk up a flight of stairs, walk up a hill or do heavy housework WITHOUT having chest pain or shortness of breath? Answer: YES                   Allergies/Conditions/Medications Not Found        The following were not recognized by our system when generating the recommendations  Please consider if this would impact any preoperative protocols  ? Assistive Devices: CPAP       ? Lumbar spinal stenosis       ? Never a smoker       ? No illicit drug use       ? Screening cholesterol level       ? Social alcohol use       ? Submandibular lymphadenopathy       ? [place] CAPS       ? Probiotic CAPS       ? Saxenda 18 MG/3ML Subcutaneous Solution Pen-injector                  Appointment Information         Date:    01/09/2018        Location:    [facility]        Address:           Directions:                      Footnotes revision 14      ?? Denotes a free-text entry  Legal Disclaimer: Any and all recommendations and services provided herein are designed to assist in the preoperative care of the patient  Nothing contained herein is designed to replace, eliminate or alleviate the responsibility of the attending physician to supervise and determine the patient?s preoperative care and course of treatment   Failure to provide complete, accurate information may negatively impact the system?s ability to recommend the proper preoperative protocol  THE ATTENDING PHYSICIAN IS RESPONSIBLE TO REVIEW THE SUGGESTED PREOPERATIVE PROTOCOLS/COURSE OF TREATMENT AND PRESCRIBE THE FINAL COURSE OF PREOPERATIVE TREATMENT IN CONSULTATION WITH THE PATIENT  THE ePREOP SYSTEM AND ITS MATERIALS ARE PROVIDED ? AS IS? WITHOUT WARRANTY OF ANY KIND, EXPRESS OR IMPLIED, INCLUDING, BUT NOT LIMITED TO, WARRANTIES OF PERFORMANCE OR MERCHANTABILITY OR FITNESS FOR A PARTICULAR PURPOSE  PATIENT AND PHYSICIANS HEREBY AGREE THAT THEIR USE OF THE MATERIALS AND RESOURCES ACT AS A CONSENT TO RELEASE AND WAIVE ePREOP FROM ANY AND ALL CLAIMS OF WARRANTY, TORT OR CONTRACT LAW OF ANY KIND

## 2018-01-12 VITALS
HEIGHT: 69 IN | RESPIRATION RATE: 18 BRPM | HEART RATE: 68 BPM | WEIGHT: 304.05 LBS | TEMPERATURE: 98.4 F | BODY MASS INDEX: 45.03 KG/M2 | DIASTOLIC BLOOD PRESSURE: 82 MMHG | SYSTOLIC BLOOD PRESSURE: 140 MMHG | OXYGEN SATURATION: 95 %

## 2018-01-12 VITALS
DIASTOLIC BLOOD PRESSURE: 84 MMHG | BODY MASS INDEX: 45.99 KG/M2 | HEIGHT: 69 IN | SYSTOLIC BLOOD PRESSURE: 156 MMHG | WEIGHT: 310.5 LBS | HEART RATE: 70 BPM

## 2018-01-12 NOTE — MISCELLANEOUS
Provider Comments  Provider Comments:   Pt was a n/s  LMOM to call back and make a new apt        Signatures   Electronically signed by : KRIS Cedeno ; May 26 2017  3:33PM EST                       (Author)

## 2018-01-12 NOTE — RESULT NOTES
Message  Spoke with patient  Severe back pain , RLE feels weak  Occasional pain in LLE  No relief from tramadol, Oxycontin  Minimal relief from steroids for the first few days  Muscle relaxant was too sedating  Offered another course of steroids, but declined  He has an appt with pain mgt next week-will attempt to get an earlier appt  +relief from injections 10 years ago         Verified Results  * MRI LUMBAR SPINE [address] [employer] [id number] 02:21PM Sanjeev Duke     Test Name Result Flag Reference   MRI [address] HAND (Report)     This is a summary report  The complete report is available in the patient's medical record  If you cannot access the medical record, please contact the sending organization for a detailed fax or copy  MRI LUMBAR SPINE WITHOUT CONTRAST     INDICATION: Back pain  Right lower extremity radiculopathy  COMPARISON: None  TECHNIQUE: Sagittal T1, sagittal T2, sagittal inversion recovery, axial T1 and axial T2, coronal T2       IMAGE QUALITY: Examination is slightly limited as a result of motion artifact and patient body habitus  FINDINGS:     ALIGNMENT: There is mild left convex curvature the lumbar spine with its apex at L4  No anterolisthesis, retrolisthesis, or compression deformity  MARROW SIGNAL: Heterogeneous marrow signal is noted throughout visualized osseous structures suggestive of red marrow reconversion  Endplate degenerative signal is noted at multiple levels, most severe at L4-L5 and L5-S1  Vertebral body hemangioma is    noted at T12  No focal marrow signal lesion to suggest osseous malignancy  DISTAL CORD AND CONUS: Normal size and signal within the distal cord and conus  The conus ends at the L1-L2 level  PARASPINAL SOFT TISSUES: Paraspinal soft tissues are unremarkable  SACRUM: Normal signal within the sacrum  No evidence of insufficiency or stress fracture  LOWER THORACIC DISC SPACES: Disc desiccation at multiple levels   Left eccentric disc osteophyte complex is noted at T12-L1 resulting in mild central canal stenosis  LUMBAR DISC SPACES: Multilevel disc dislocation is noted  L1-L2: Mild facet degenerative changes  No significant central canal or foraminal stenosis  L2-L3: Mild facet degenerative change  No significant central canal or foraminal stenosis  L3-L4: Mild circumferential annular bulge  Small focal dorsal central and right paracentral protrusion type disc herniation  In conjunction with hypertrophic facet and posterior ligament degenerative changes there is severe central canal stenosis  AP   dimension of the canal to the right of midline is diminished to as little as 4 mm  There is crowding of cauda equina nerve roots  L4-L5: Loss of disc height with disc desiccation and circumferential disc osteophyte complex formation  In conjunction with hypertrophic facet and posterior ligament degenerative changes there is moderate central canal stenosis with crowding of cauda    equina nerve roots  Moderate to severe bilateral lateral recess narrowing is seen  By foraminal stenosis is noted  L5-S1: Broad-based left paracentral and foraminal protrusion type disc herniation  In conjunction with hypertrophic facet degenerative change there is severe left lateral recess narrowing with impingement upon descending left S1 nerve root  Also noted   is moderate left foraminal stenosis with slight impingement upon exiting left L5 nerve root  IMPRESSION:     Lumbar degenerative changes as described  Severe central canal stenosis at L3-L4  Moderate central canal stenosis and moderate to severe bilateral lateral recess narrowing at L4-L5  Left lateral recess and left neural foramina narrowing at L4-L5 on the basis of broad-based left paracentral and foraminal protrusion correlate for left L5 and S1 radicular symptoms       Markedly heterogeneous marrow signal throughout osseous structures suggestive of red marrow conversion         Workstation performed: [id number]     Signed by:   Pramod Martines MD   9/12/16       Signatures   Electronically signed by : KRIS Darden ; Sep 13 2016  8:32AM EST                       (Author)

## 2018-01-15 ENCOUNTER — APPOINTMENT (OUTPATIENT)
Dept: PHYSICAL THERAPY | Facility: CLINIC | Age: 63
End: 2018-01-15
Payer: COMMERCIAL

## 2018-01-15 ENCOUNTER — GENERIC CONVERSION - ENCOUNTER (OUTPATIENT)
Dept: OTHER | Facility: OTHER | Age: 63
End: 2018-01-15

## 2018-01-15 PROCEDURE — G8991 OTHER PT/OT GOAL STATUS: HCPCS

## 2018-01-15 PROCEDURE — G8990 OTHER PT/OT CURRENT STATUS: HCPCS

## 2018-01-15 PROCEDURE — 97140 MANUAL THERAPY 1/> REGIONS: CPT

## 2018-01-15 PROCEDURE — 97110 THERAPEUTIC EXERCISES: CPT

## 2018-01-15 NOTE — RESULT NOTES
Message      Please call him that his blood tests show that his sugar is slightly elevated  The rest is normal  I'll see him in a few weeks        Verified Results  (Q) COMPREHENSIVE METABOLIC PNL W/ADJUSTED CALCIUM [id number] 10:34AM Linell Frederick     Test Name Result Flag Reference   GLUCOSE 110 mg/dL H 65-99   Fasting reference interval   UREA NITROGEN (BUN) 24 mg/dL  7-25   CREATININE 1 17 mg/dL  0 70-1 25   For patients >52years of age, the reference limit  for Creatinine is approximately 13% higher for people  identified as -American  eGFR NON-AFR   AMERICAN 67 mL/min/1 73m2  > OR = 60   eGFR AFRICAN AMERICAN 78 mL/min/1 73m2  > OR = 60   BUN/CREATININE RATIO   8-80   NOT APPLICABLE (calc)   SODIUM 139 mmol/L  135-146   POTASSIUM 4 5 mmol/L  3 5-5 3   CHLORIDE 103 mmol/L     CARBON DIOXIDE 27 mmol/L  19-30   CALCIUM 9 4 mg/dL  8 6-10 3   CALCIUM (ADJUSTED FOR$ALBUMIN) 9 5 mg/dL (calc)  8 6-10 2   PROTEIN, TOTAL 7 2 g/dL  6 1-8 1   ALBUMIN 4 3 g/dL  3 6-5 1   GLOBULIN 2 9 g/dL (calc)  1 9-3 7   ALBUMIN/GLOBULIN RATIO 1 5 (calc)  1 0-2 5   BILIRUBIN, TOTAL 0 6 mg/dL  0 2-1 2   ALKALINE PHOSPHATASE 50 U/L     AST 17 U/L  10-35   ALT 24 U/L  9-46     (Q) CBC (H/H, RBC, INDICES, WBC, PLT) [id number] 10:34AM Line [place]     Test Name Result Flag Reference   WHITE BLOOD CELL COUNT 6 1 Thousand/uL  3 8-10 8   RED BLOOD CELL COUNT 4 68 Million/uL  4 20-5 80   HEMOGLOBIN 14 0 g/dL  13 2-17 1   HEMATOCRIT 42 2 %  38 5-50 0   MCV 90 2 fL  80 0-100 0   MCH 29 8 pg  27 0-33 0   MCHC 33 0 g/dL  32 0-36 0   RDW 13 6 %  11 0-15 0   PLATELET COUNT 980 Thousand/uL  140-400     (Q) TSH, 3RD GENERATION W/REFLEX TO FT4 [id number] 10:34AM Linell [place]     Test Name Result Flag Reference   TSH W/REFLEX TO FT4 1 84 mIU/L  0 40-4 50     (Q) LIPID PANEL WITH REFLEX TO DIRECT LDL [id number] 10:34AM Hugh Mack     Test Name Result Flag Reference   CHOLESTEROL, TOTAL 182 mg/dL  125-200   HDL CHOLESTEROL 33 mg/dL L > OR = 40 TRIGLICERIDES 402 mg/dL  <150   LDL-CHOLESTEROL 120 mg/dL (calc)  <130   Desirable range <100 mg/dL for patients with CHD or  diabetes and <70 mg/dL for diabetic patients with  known heart disease  CHOL/HDLC RATIO 5 5 (calc) H < OR = 5 0   NON HDL CHOLESTEROL 149 mg/dL (calc)     Target for non-HDL cholesterol is 30 mg/dL higher than   LDL cholesterol target  (Q) URINALYSIS REFLEX [id number] 10:34AM Juan Bishop   REPORT COMMENT:  FASTING:YES     Test Name Result Flag Reference   COLOR YELLOW  YELLOW   APPEARANCE CLEAR  CLEAR   SPECIFIC GRAVITY 1 023  1 001-1 035   PH 5 0  5 0-8 0   GLUCOSE NEGATIVE  NEGATIVE   BILIRUBIN NEGATIVE  NEGATIVE   KETONES NEGATIVE  NEGATIVE   OCCULT BLOOD NEGATIVE  NEGATIVE   PROTEIN NEGATIVE  NEGATIVE   NITRITE NEGATIVE  NEGATIVE   LEUKOCYTE ESTERASE NEGATIVE  NEGATIVE     (Q) PSA, TOTAL WITH REFLEX TO PSA, FREE [id number] 10:34AM Juan Bishop     Test Name Result Flag Reference   TOTAL PSA 0 3 ng/mL  < OR = 4 0   This test was performed using the Promise Lamont  immunoassay method  Values obtained with other assay  methods cannot be used interchangeably  PSA levels,  regardless of value, should not be interpreted as  absolute evidence of the presence or absence of disease  Plan  Impaired fasting glucose    · (Q) HEMOGLOBIN A1C WITH MPG; Status:Active;  Requested [facility]:[id number];     Signatures   Electronically signed by : KRIS Jara ; Jan 12 2016  1:02PM EST                       (Author)

## 2018-01-15 NOTE — PROGRESS NOTES
Assessment    1  Encounter for preventive health examination (V70 0) (Z00 00)   2  Morbid obesity (278 01) (E66 01)   3  Impaired fasting glucose (790 21) (R73 01)   4  Dyslipidemia (272 4) (E78 5)   5  Chronic low back pain (724 2,338 29) (M54 5,G89 29)   6  Chronic right shoulder pain (719 41,338 29) (M25 511,G89 29)   7  Elevated serum creatinine (790 99) (R79 89)    Plan  Chronic low back pain    · Naproxen 500 MG Oral Tablet; TAKE 1-2 TABLET EVERY 12 HOURS AS  NEEDED FOR PAIN  Chronic right shoulder pain    · 1 - Wilmar CUEVA, Jeri Dhaliwal  (Orthopedic Surgery) Co-Management  *  Status: Active   Requested for: 29FOS2669  Care Summary provided  : Yes  Elevated serum creatinine    · (Q) BASIC METABOLIC PANEL W/O CA; Status:Active; Requested for:10Oct2017; Morbid obesity    · Saxenda 18 MG/3ML Subcutaneous Solution Pen-injector; 0 6mg SQ daily for 1  week increase by 0 6mg weekly to 3mg daily if tolerated  Need for prophylactic vaccination and inoculation against influenza    · Fluzone Quadrivalent 0 5 ML Intramuscular Suspension Prefilled Syringe    Discussion/Summary  health maintenance visit Currently, he eats a poor diet and has an inadequate exercise regimen  Prostate cancer screening: prostate cancer screening is current  Colorectal cancer screening: colorectal cancer screening is current and colonoscopy is needed every ten years  The immunizations will be given as outlined in the orders  Advice and education were given regarding weight loss  Start Saxenda  Not interested in surgery  Limit NSAID use  BMP, nonfasting in the next 2 weeks  rto in 2months  The patient was counseled regarding diagnostic results, impressions  Possible side effects of new medications were reviewed with the patient/guardian today  The treatment plan was reviewed with the patient/guardian   The patient/guardian understands and agrees with the treatment plan      Chief Complaint  wellness      History of Present Illness  HM, Adult Male: The patient is being seen for a health maintenance evaluation  The last health maintenance visit was ? year(s) ago  Social History: Household members include spouse  He is   Work status: working full time  The patient has never smoked cigarettes  He reports frequent alcohol use and drinking 1 drinks per day  The patient has no concerns about alcohol abuse  He has never used illicit drugs  General Health: The patient's health since the last visit is described as good  He has regular dental visits  The patient brushes 2 time(s) a day, flosses 2 time(s) a day and reports his last dental visit was 09/2017  Dental problems: no tooth pain and no caries  He complains of vision problems  Vision care includes wearing reading glasses and having regular eye examinations  He denies hearing loss  Hearing is normal   He doesn't wear a hearing aid  Immunizations status: up to date  Lifestyle:  He does not have a healthy diet  He has weight concerns  Weight control issues: overweight  He does not exercise regularly  He does not use tobacco  The patient has never smoked cigarettes  He consumes alcohol  He reports occasional alcohol use and drinking 1 drinks per day  He typically drinks wine, but no beer consumption and no hard liquor consumption  Alcohol concern: The patient has no concerns about alcohol abuse  He denies drug use  He has never used illicit drugs  Reproductive health:  the patient is sexually active  He is monogamous with a female partner  birth control is not being practiced  Screening: Prostate cancer screening includes no previous evaluation  Colorectal cancer screening includes last colonoscopy done this year  metabolic screening reviewed and current  Cardiovascular risk factors: obesity, but no hypertension, no diabetes, no high LDL cholesterol and no low HDL cholesterol  Review of Systems    Constitutional: no fever, no recent weight gain, no chills and no recent weight loss  Cardiovascular: no chest pain and no palpitations  Respiratory: no shortness of breath and no cough  Gastrointestinal: no abdominal pain, no constipation and no diarrhea  Genitourinary: no nocturia  Musculoskeletal: chronic low back pain    The patient presents with complaints of right shoulder arthralgias  Active Problems    1  Chronic low back pain (724 2,338 29) (M54 5,G89 29)   2  DM (diabetes mellitus screen) (V77 1) (Z13 1)   3  Dyslipidemia (272 4) (E78 5)   4  Erectile dysfunction (607 84) (N52 9)   5  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 3) (M47 816)   6  Herniation of lumbar intervertebral disc with radiculopathy (722 10) (M51 16)   7  Impaired fasting glucose (790 21) (R73 01)   8  Lumbar spinal stenosis (724 02) (M48 061)   9  Morbid obesity (278 01) (E66 01)   10  Need for hepatitis C screening test (V73 89) (Z11 59)   11  Need for prophylactic vaccination and inoculation against influenza (V04 81) (Z23)   12  Need for Tdap vaccination (V06 1) (Z23)   13  Obstructive sleep apnea (327 23) (G47 33)   14  Palpitations (785 1) (R00 2)   15  Right hip pain (719 45) (M25 551)   16  Screen for colon cancer (V76 51) (Z12 11)   17  Screening cholesterol level (V77 91) (Z13 220)   18  Screening PSA (prostate specific antigen) (V76 44) (Z12 5)   19   Submandibular lymphadenopathy (785 6) (R59 0)    Past Medical History    · History of Bone pain (733 90) (M89 8X9)   · History of acute bronchitis (V12 69) (Z87 09)   · History of traveler's diarrhea (V12 79) (Z86 19)   · No pertinent past medical history   · History of No pertinent past surgical history   · History of Tinnitus, left (388 30) (H93 12)    Surgical History    · Denied: History Of Prior Surgery    Family History  Mother    · Family history of    · Family history of cardiac disorder (V17 49) (Z80 55)  Father    · Family history of    · Family history of lung cancer (V16 1) (Z80 1)    Social History    · Assistive Devices: CPAP   · Never a smoker   · No illicit drug use   · Social alcohol use (Z78 9)    Current Meds   1  Aspirin  MG Oral Tablet Delayed Release; Therapy: (Recorded:11Aug2014) to Recorded   2  Cialis 20 MG Oral Tablet; take 1 tablet 1 hour before activity as needed; Therapy: 48HCF6432 to (66 216 78 09)  Requested for: 22IED6898; Last   Rx:[id number] Ordered   3  [facility]; Therapy: (Recorded:30Mar2017) to Recorded   4  Probiotic CAPS; Therapy: (Recorded:30Mar2017) to Recorded    Allergies    1  No Known Drug Allergies    Vitals   Recorded: 09URE6333 03:42PM   Temperature 98 F   Heart Rate 74   Respiration 20   Systolic 989   Diastolic 90   Weight 168 lb 0 4 oz   BMI Calculated 45 64   BSA Calculated 2 49   O2 Saturation 93     Physical Exam    Constitutional   General appearance: No acute distress, well appearing and well nourished  morbidly obese  Head and Face   Head and face: Normal     Eyes   Conjunctiva and lids: No erythema, swelling or discharge  Ears, Nose, Mouth, and Throat   Otoscopic examination: Tympanic membranes translucent with normal light reflex  Canals patent without erythema  Oropharynx: Normal with no erythema, edema, exudate or lesions  Neck   Neck: Abnormal   movable mass on the left submandibular region  Thyroid: Normal, no thyromegaly  Pulmonary   Respiratory effort: No increased work of breathing or signs of respiratory distress  Auscultation of lungs: Clear to auscultation  Cardiovascular   Auscultation of heart: Normal rate and rhythm, normal S1 and S2, no murmurs  Abdomen   Abdomen: Non-tender, no masses  Lymphatic   Palpation of lymph nodes in neck: Abnormal     Psychiatric   Orientation to person, place and time: Normal     Mood and affect: Normal        Future Appointments    Date/Time Provider Specialty Site   12/07/2017 02:00 PM KRSI Clancy   [facility]     Signatures   Electronically signed by KRIS Solares ; Oct 10 2017  5:52PM EST                       (Author)

## 2018-01-15 NOTE — PROGRESS NOTES
Assessment   1  Morbid obesity (278 01) (E66 01)  2  Non smoker (V49 89) (Z78 9)  3  Impaired fasting glucose (790 21) (R73 01)    Plan  Morbid obesity    · Start: Phentermine HCl - 15 MG Oral Capsule; TAKE 1 CAPSULE EVERY MORNING BEFORE  BREAKFAST  Morbid obesity, Obstructive sleep apnea    · EKG/ECG- POC; Status:Complete;   Done: 72VEO4132 10:56AM    Discussion/Summary  Discussion Summary:   Normal EKG  Stat phentermine to supplement diet and exercise for weight loss   Portion control  1  4-6 week f/u      Counseling Documentation With Imm: The patient was counseled regarding diagnostic results, instructions for management, impressions  total time of encounter was 30 minutes and >15 minutes was spent counseling  Medication SE Review and Pt Understands Tx: Possible side effects of new medications were reviewed with the patient/guardian today  The treatment plan was reviewed with the patient/guardian  The patient/guardian understands and agrees with the treatment plan       1 Amended By: Krystle Hi; Jan 25 2016 11:35 AM EST    Chief Complaint  Chief Complaint Free Text Note Form: Patient presents to office today for a 2 week follow-up visit, discuss weight      History of Present Illness  HPI: He was seen 2 weeks ago primarily to discuss his weight  Labs done and reviewed today- normal A1C, lipid panel except HDL is slightly low, normal CMP, CBC, TSH, PSA, UA  He read the literature I gave him and is interested in stating phentermine  He understands that he needs to change his diet and stat exercising    Problem with him is portion control  1  Not ready for weight loss surgery       1 Amended By: Krystle Hi; Jan 25 2016 11:35 AM EST    Review of Systems  Complete-Male:   Constitutional: recent weight gain, but no fever, not feeling poorly and not feeling tired  Cardiovascular: no chest pain and no palpitations  Respiratory: no shortness of breath and no cough     Gastrointestinal: no abdominal pain, no constipation and no diarrhea  Genitourinary: no dysuria  Musculoskeletal: low back pain  Active Problems   1  Chronic low back pain (724 2,338 29) (M54 5,G89 29)  2  DM (diabetes mellitus screen) (V77 1) (Z13 1)  3  Impaired fasting glucose (790 21) (R73 01)  4  Morbid obesity (278 01) (E66 01)  5  Obstructive sleep apnea (327 23) (G47 33)  6  Screening cholesterol level (V77 91) (Z13 220)  7  Screening PSA (prostate specific antigen) (V76 44) (Z12 5)    Past Medical History   1  History of Bone pain (733 90) (M89 8X9)  2  History of acute bronchitis (V12 69) (Z87 09)  3  History of traveler's diarrhea (V12 79) (Z86 19)  4  No pertinent past medical history  5  History of No pertinent past surgical history  6  History of Tinnitus, left (388 30) (H93 12)  Active Problems And Past Medical History Reviewed: The active problems and past medical history were reviewed and updated today  Surgical History   1  Denied: History Of Prior Surgery  Surgical History Reviewed: The surgical history was reviewed and updated today  Family History   1  Family history of   2  Family history of cardiac disorder (V17 49) (Z82 49)   3  Family history of   4  Family history of lung cancer (V16 1) (Z80 1)  Family History Reviewed: The family history was reviewed and updated today  Social History    · Non smoker (V49 89) (Z78 9)   · Social alcohol use (F10 99)  Social History Reviewed: The social history was reviewed and updated today  Current Meds  1  Aspirin  MG Oral Tablet Delayed Release; Therapy: (Recorded:2014) to Recorded  2  Naproxen 500 MG Oral Tablet; TAKE 1-2 TABLET EVERY 12 HOURS AS NEEDED FOR PAIN;   Therapy: 59SJH9012 to (Evaluate:2016)  Requested for: 68OML4941; Last Rx:2016   Ordered  3  Probiotic Oral Capsule; TAKE 1 CAPSULE ORALLY DAILY (CANDIDIASIS OF MOUTH) *RE-ORDER*;   Therapy: 94EJC6724 to Recorded  4   RA Krill Oil 500 MG Oral Capsule; take 1 capsule daily; Therapy: 01XPR2009 to Recorded  5  Vitamin D3 1000 UNIT Oral Capsule; One daily gluten free; Therapy: 84LUV9354 to Recorded  Medication List Reviewed: The medication list was reviewed and updated today  Allergies   1  No Known Drug Allergies    Vitals  Vital Signs [Data Includes: Current Encounter]    Recorded: 20HJH6321 10:28AM   Heart Rate 65   Systolic 028, LUE, Sitting   Diastolic 76, LUE, Sitting   Height 5 ft 8 9 in   Weight 318 lb 9 6 oz   BMI Calculated 47 18   BSA Calculated 2 52   O2 Saturation 97     Physical Exam    Constitutional   General appearance: No acute distress, well appearing and well nourished  morbidly obese  Eyes   Conjunctiva and lids: No swelling, erythema, or discharge  Ears, Nose, Mouth, and Throat   Otoscopic examination: Tympanic membrance translucent with normal light reflex  Canals patent without erythema  Oropharynx: Normal with no erythema, edema, exudate or lesions  Pulmonary   Respiratory effort: No increased work of breathing or signs of respiratory distress  Auscultation of lungs: Clear to auscultation, equal breath sounds bilaterally, no wheezes, no rales, no rhonci  Cardiovascular   Auscultation of heart: Normal rate and rhythm, normal S1 and S2, without murmurs  Lymphatic   Palpation of lymph nodes in neck: No lymphadenopathy           Signatures   Electronically signed by : KRIS Stratton ; Jan 25 2016 11:04AM EST                       (Author)    Electronically signed by : KRIS Stratton ; Jan 25 2016 11:35AM EST                       (Author)

## 2018-01-15 NOTE — CONSULTS
Chief Complaint  Chief Complaint Free Text Note Form: Patient is here today for MWM Consultation  Stop Bang: JAZMYN- Uses CPAP machine  Past Medical History    1  History of Bone pain (733 90) (M89 8X9)   2  History of acute bronchitis (V12 69) (Z87 09)   3  History of traveler's diarrhea (V12 79) (Z86 19)   4  No pertinent past medical history   5  History of No pertinent past surgical history   6  History of Tinnitus, left (388 30) (H93 12)  Active Problems And Past Medical History Reviewed: The active problems and past medical history were reviewed and updated today  Assessment    1  Morbid obesity (278 01) (E66 01)   2  Obstructive sleep apnea (327 23) (G47 33)    Discussion/Summary  Discussion Summary:   70-year-old male with JAZMYN and morbid obesity here to pursue medical weight management to improve weight and health  Obesity class 3:  -discussed options of conservative vs ENA program +/- meal replacement vs VLCD and bariatric surgery  -initial focus of 5-10% weight loss over 3-6 mos for improved health      JAZMYN:  -Encouraged continued use of C Pap    Patient is likely to pursue bariatric surgery at this juncture and will attend the informational seminar  Patient's Capacity to Self-Care: Patient is able to Self-Care  Bariatric Medicine Diagnostic [facility]:   Patient Discussion: 45 minute visit, greater than half of the time was spent on counseling  Extensive counseling spent on different avenues of weight loss including bariatric surgery  Discussed the benefits and potential complications of bariatric surgery and how it relates to comorbid conditions and long-term weight loss  Discussed our nonsurgical options including intensive lifestyle management program, very low calorie diet and conservative programs  Discussed the role of weight loss medications  Counseled patient on the multifactorial etiology of excess weight issues   Understands and agrees with treatment plan:  The treatment plan was reviewed with the patient/guardian  The patient/guardian understands and agrees with the treatment plan   Self Referrals:   Self Referrals: No      Active Problems    1  Chronic low back pain (724 2,338 29) (M54 5,G89 29)   2  DM (diabetes mellitus screen) (V77 1) (Z13 1)   3  Erectile dysfunction (607 84) (N52 9)   4  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 3) (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)   5  Herniation of lumbar intervertebral disc with radiculopathy (722 10) (M51 16)   6  Impaired fasting glucose (790 21) (R73 01)   7  Lumbar spinal stenosis (724 02) (M48 06)   8  Morbid obesity (278 01) (E66 01)   9  Need for prophylactic vaccination and inoculation against influenza (V04 81) (Z23)   10  Need for Tdap vaccination (V06 1) (Z23)   11  Obstructive sleep apnea (327 23) (G47 33)   12  Palpitations (785 1) (R00 2)   13  Right hip pain (719 45) (M25 551)   14  Screen for colon cancer (V76 51) (Z12 11)   15  Screening cholesterol level (V77 91) (Z13 220)   16  Screening PSA (prostate specific antigen) (V76 44) (Z12 5)   17  Submandibular lymphadenopathy (785 6) (R59 0)    Surgical History    1  Denied: History Of Prior Surgery  Surgical History Reviewed: The surgical history was reviewed and updated today  Family History  Mother    1  Family history of    2  Family history of cardiac disorder (V17 49) (Z82 49)  Father    3  Family history of    4  Family history of lung cancer (V16 1) (Z80 1)  Family History Reviewed: The family history was reviewed and updated today  Social History    · Social alcohol use (Z78 9)  Social History Reviewed: The social history was reviewed and updated today  Current Meds   1  Aspirin  MG Oral Tablet Delayed Release; Therapy: (Recorded:83Din6523) to Recorded   2  Cialis 20 MG Oral Tablet; take 1 tablet 1 hour before activity as needed; Therapy: 13UUD1796 to (Silvia Foster)  Requested for: 44NXD9354; Last   Rx:03Ypc7091 Ordered   3   Denis Mancia CAPS;   Therapy: (Recorded:30Mar2017) to Recorded   4  Probiotic CAPS; Therapy: (Recorded:30Mar2017) to Recorded  Medication List Reviewed: The medication list was reviewed and updated today  Allergies    1  No Known Drug Allergies    Vitals  Vital Signs    Recorded: 05NAC1675 10:30AM   Temperature 97 9 F, Tympanic   Heart Rate 72, L Radial   Pulse Quality Norm   Systolic 543, LUE, Sitting   Diastolic 78, LUE, Sitting   Height 5 ft 9 in   Weight 305 lb 9 6 oz   BMI Calculated 45 13   BSA Calculated 2 47     Future Appointments    Date/Time Provider Specialty Site   05/22/2017 10:30 AM Earnie Cheadle, M D   Internal [facility]     Signatures   Electronically signed by : KRIS oRmano ; Mar 30 2017  5:33PM EST                       (Author)

## 2018-01-16 ENCOUNTER — ALLSCRIPTS OFFICE VISIT (OUTPATIENT)
Dept: OTHER | Facility: OTHER | Age: 63
End: 2018-01-16

## 2018-01-17 NOTE — PROGRESS NOTES
Assessment    1  Submandibular lymphadenopathy (785 6) (R59 0)    Discussion/Summary  Discussion Summary:   Enlarged node feels smaller  I would like to continue clinically monitoring this  No red flags to indicate need for imaging ifeanyi since it is smaller  Call if it gets larger or he develops new symptoms  RTO 2months  Patient's Capacity to Self-Care: Patient is able to Self-Care  Medication SE Review and Pt Understands Tx: The treatment plan was reviewed with the patient/guardian  The patient/guardian understands and agrees with the treatment plan      Chief Complaint  Chief Complaint Free Text Note Form: Patient presents to office today for a follow-up for swollen gland      History of Present Illness  HPI: Patient was seen 2 weeks ago and enlarged left submandibular lymph noted on exam  He does not have any cold symptoms, ear pain, trouble swallowing, fever, chills, night sweats, weight loss  He feels that it has slightly gotten smaller  Wife thinks it is about the same size      Review of Systems  Complete-Male:   Constitutional: no fever, no chills and no recent weight loss  ENT: no earache, no sore throat and no nasal discharge  Neurological: no headache  Active Problems    1  Chronic low back pain (724 2,338 29) (M54 5,G89 29)   2  DM (diabetes mellitus screen) (V77 1) (Z13 1)   3  Erectile dysfunction (607 84) (N52 9)   4  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 3) (M47 816)   5  Herniation of lumbar intervertebral disc with radiculopathy (722 10) (M51 16)   6  Impaired fasting glucose (790 21) (R73 01)   7  Lumbar spinal stenosis (724 02) (M48 06)   8  Morbid obesity (278 01) (E66 01)   9  Need for prophylactic vaccination and inoculation against influenza (V04 81) (Z23)   10  Need for Tdap vaccination (V06 1) (Z23)   11  Obstructive sleep apnea (327 23) (G47 33)   12  Palpitations (785 1) (R00 2)   13  Right hip pain (719 45) (M25 551)   14  Screen for colon cancer (V76 51) (Z12 11)   15  Screening cholesterol level (V77 91) (Z13 220)   16  Screening PSA (prostate specific antigen) (V76 44) (Z12 5)   17  Submandibular lymphadenopathy (785 6) (R59 0)    Past Medical History    1  History of Bone pain (733 90) (M89 8X9)   2  History of acute bronchitis (V12 69) (Z87 09)   3  History of traveler's diarrhea (V12 79) (Z86 19)   4  No pertinent past medical history   5  History of No pertinent past surgical history   6  History of Tinnitus, left (388 30) (H93 12)  Active Problems And Past Medical History Reviewed: The active problems and past medical history were reviewed and updated today  Surgical History    1  Denied: History Of Prior Surgery  Surgical History Reviewed: The surgical history was reviewed and updated today  Family History  Mother    1  Family history of    2  Family history of cardiac disorder (V17 49) (Z82 49)  Father    3  Family history of    4  Family history of lung cancer (V16 1) (Z80 1)  Family History Reviewed: The family history was reviewed and updated today  Social History    · Non smoker (V49 89) (Z78 9)   · Social alcohol use (Z78 9)  Social History Reviewed: The social history was reviewed and updated today  Current Meds   1  Aspirin  MG Oral Tablet Delayed Release; Therapy: (Recorded:20Sad3120) to Recorded   2  Cialis 20 MG Oral Tablet; take 1 tablet 1 hour before activity as needed; Therapy: 91PDG8633 to ([phone])  Requested for: 09LTK7416; Last Rx:41Zgk2206   Ordered   3  Naproxen 500 MG Oral Tablet; TAKE 1-2 TABLET EVERY 12 HOURS AS NEEDED FOR PAIN;   Therapy: 42IHI8262 to (Evaluate:09Fpn7231)  Requested for: 37Zas5120; Last Rx:75Bbz4890   Ordered  Medication List Reviewed: The medication list was reviewed and updated today  Allergies    1   No Known Drug Allergies    Vitals  Vital Signs    Recorded: 64LNZ8719 03:57PM   Heart Rate 69   Systolic 381   Diastolic 80   Height 5 ft 8 9 in   Weight 307 lb 8 oz   BMI Calculated 45 54   BSA Calculated 2 48   O2 Saturation 95     Physical Exam    Constitutional   General appearance: No acute distress, well appearing and well nourished  Eyes   Conjunctiva and lids: No swelling, erythema, or discharge  Ears, Nose, Mouth, and Throat   Otoscopic examination: Tympanic membrance translucent with normal light reflex  Canals patent without erythema  Oropharynx: Normal with no erythema, edema, exudate or lesions  Pulmonary   Respiratory effort: No increased work of breathing or signs of respiratory distress  Auscultation of lungs: Clear to auscultation, equal breath sounds bilaterally, no wheezes, no rales, no rhonci  Cardiovascular   Auscultation of heart: Normal rate and rhythm, normal S1 and S2, without murmurs  Lymphatic   Palpation of lymph nodes in neck: Abnormal   (<1cm left sided lymph node, now felt to be more post auricular, mobile, nontender, not indurated)        Future Appointments    Date/Time Provider Specialty Site   03/30/2017 10:15 AM KRIS Rockwell   Bariatric Medicine [facility]     Signatures   Electronically signed by : KRIS Bey ; Mar  8 2017  4:18PM EST                       (Author)

## 2018-01-18 ENCOUNTER — APPOINTMENT (OUTPATIENT)
Dept: PHYSICAL THERAPY | Facility: CLINIC | Age: 63
End: 2018-01-18
Payer: COMMERCIAL

## 2018-01-18 PROCEDURE — 97140 MANUAL THERAPY 1/> REGIONS: CPT

## 2018-01-18 PROCEDURE — 97110 THERAPEUTIC EXERCISES: CPT

## 2018-01-22 ENCOUNTER — HOSPITAL ENCOUNTER (OUTPATIENT)
Dept: RADIOLOGY | Facility: HOSPITAL | Age: 63
Discharge: HOME | End: 2018-01-22
Attending: ORTHOPAEDIC SURGERY
Payer: COMMERCIAL

## 2018-01-22 VITALS
BODY MASS INDEX: 45.63 KG/M2 | WEIGHT: 309.02 LBS | RESPIRATION RATE: 20 BRPM | TEMPERATURE: 98 F | HEART RATE: 74 BPM | OXYGEN SATURATION: 93 % | SYSTOLIC BLOOD PRESSURE: 130 MMHG | DIASTOLIC BLOOD PRESSURE: 90 MMHG

## 2018-01-22 VITALS
HEIGHT: 69 IN | DIASTOLIC BLOOD PRESSURE: 78 MMHG | BODY MASS INDEX: 45.26 KG/M2 | WEIGHT: 305.6 LBS | HEART RATE: 72 BPM | SYSTOLIC BLOOD PRESSURE: 142 MMHG | TEMPERATURE: 97.9 F

## 2018-01-22 VITALS
OXYGEN SATURATION: 95 % | BODY MASS INDEX: 45.54 KG/M2 | HEIGHT: 69 IN | SYSTOLIC BLOOD PRESSURE: 140 MMHG | HEART RATE: 69 BPM | WEIGHT: 307.5 LBS | DIASTOLIC BLOOD PRESSURE: 80 MMHG

## 2018-01-22 DIAGNOSIS — Z48.89 ENCOUNTER FOR OTHER SPECIFIED SURGICAL AFTERCARE (CODE): ICD-10-CM

## 2018-01-22 DIAGNOSIS — Z47.1 AFTERCARE FOLLOWING JOINT REPLACEMENT: ICD-10-CM

## 2018-01-22 PROCEDURE — 73030 X-RAY EXAM OF SHOULDER: CPT

## 2018-01-23 ENCOUNTER — APPOINTMENT (OUTPATIENT)
Dept: PHYSICAL THERAPY | Facility: CLINIC | Age: 63
End: 2018-01-23
Payer: COMMERCIAL

## 2018-01-23 VITALS
SYSTOLIC BLOOD PRESSURE: 132 MMHG | HEART RATE: 78 BPM | BODY MASS INDEX: 43.14 KG/M2 | WEIGHT: 291.25 LBS | HEIGHT: 69 IN | OXYGEN SATURATION: 95 % | DIASTOLIC BLOOD PRESSURE: 80 MMHG

## 2018-01-23 VITALS
HEART RATE: 57 BPM | DIASTOLIC BLOOD PRESSURE: 74 MMHG | BODY MASS INDEX: 46.56 KG/M2 | SYSTOLIC BLOOD PRESSURE: 138 MMHG | HEIGHT: 69 IN | WEIGHT: 314.38 LBS | OXYGEN SATURATION: 97 %

## 2018-01-23 PROCEDURE — 97140 MANUAL THERAPY 1/> REGIONS: CPT

## 2018-01-23 PROCEDURE — 97110 THERAPEUTIC EXERCISES: CPT

## 2018-01-23 NOTE — CONSULTS
Chief Complaint  Medical clearance for right shoulder replacement  History of Present Illness  Pre-Op Visit (Brief): The patient is being seen for a preoperative visit  The procedure is a(n) right shoulder replacement scheduled for 1/9/17 with Dr Shelia Yadav  The indication for surgery is end stage OA  Surgical Risk Assessment:   Prior Anesthesia: He had prior anesthesia and no prior adverse reaction to general anesthesia  Lifestyle Factors: denies alcohol use and denies tobacco use  Symptoms: no symptoms  Living Situation: home is secure and supportive and no post-op concerns with his living situation  HPI: Chronic shoulder issues, surgery scheduled  At last visit, prescribed Saxenda but it is backordered       Review of Systems    Constitutional: recent weight gain and Danielle Heather has not been available ?backordered, but no fever and no chills  Cardiovascular: no chest pain and no palpitations  Respiratory: no shortness of breath and no cough  Gastrointestinal: no abdominal pain, no constipation and no diarrhea  Musculoskeletal: as noted in HPI  Integumentary: lump under the skin on his back  Active Problems    1  Chronic low back pain (724 2,338 29) (M54 5,G89 29)   2  Chronic right shoulder pain (719 41,338 29) (M25 511,G89 29)   3  DM (diabetes mellitus screen) (V77 1) (Z13 1)   4  Dyslipidemia (272 4) (E78 5)   5  Elevated serum creatinine (790 99) (R79 89)   6  Erectile dysfunction (607 84) (N52 9)   7  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 3) (M47 816)   8  Herniation of lumbar intervertebral disc with radiculopathy (722 10) (M51 16)   9  Impaired fasting glucose (790 21) (R73 01)   10  Localized primary osteoarthritis of right shoulder region (715 11) (M19 011)   11  Lumbar spinal stenosis (724 02) (M48 061)   12  Morbid obesity (278 01) (E66 01)   13  Need for hepatitis C screening test (V73 89) (Z11 59)   14   Need for prophylactic vaccination and inoculation against influenza (V04 81) (Z23)   15  Need for Tdap vaccination (V06 1) (Z23)   16  Obstructive sleep apnea (327 23) (G47 33)   17  Palpitations (785 1) (R00 2)   18  Right hip pain (719 45) (M25 551)   19  Screen for colon cancer (V76 51) (Z12 11)   20  Screening cholesterol level (V77 91) (Z13 220)   21  Screening PSA (prostate specific antigen) (V76 44) (Z12 5)   22  Submandibular lymphadenopathy (785 6) (R59 0)    Past Medical History    · History of Bone pain (733 90) (M89 8X9)   · History of acute bronchitis (V12 69) (Z87 09)   · History of traveler's diarrhea (V12 79) (Z86 19)   · No pertinent past medical history   · History of No pertinent past surgical history   · History of Tinnitus, left (388 30) (H93 12)    The active problems and past medical history were reviewed and updated today  Surgical History    · Denied: History Of Prior Surgery    The surgical history was reviewed and updated today  Family History    · Family history of    · Family history of cardiac disorder (V17 49) (Z82 49)    · Family history of    · Family history of lung cancer (V16 1) (Z80 1)    The family history was reviewed and updated today  Social History    · Assistive Devices: CPAP   · Never a smoker   · No illicit drug use   · Social alcohol use (Z78 9)  The social history was reviewed and updated today  Current Meds   1  Aspirin  MG Oral Tablet Delayed Release; Therapy: (Recorded:2014) to Recorded   2  Cialis 20 MG Oral Tablet; take 1 tablet 1 hour before activity as needed; Therapy: 79CCP4225 to (Jorden De La Rosa)  Requested for: 17QPF6506; Last   Rx:32Zaj0948 Ordered   3  [facility]; Therapy: (Recorded:2017) to Recorded   4  Naproxen 500 MG Oral Tablet; TAKE 1-2 TABLET EVERY 12 HOURS AS NEEDED FOR   PAIN;   Therapy: 54UZS3664 to (Carmela Flores)  Requested for: 40BIR9107; Last   Rx:46Rsj7071 Ordered   5   NovoFine 32G X 6 MM Miscellaneous; Use once a day with Saxenda; Therapy: 56BSC7100 to (Evaluate:01Jan2018)  Requested for: 12ANN2072; Last   Rx:02Nov2017 Ordered   6  Probiotic CAPS; Therapy: (Recorded:30Mar2017) to Recorded   7  Saxenda 18 MG/3ML Subcutaneous Solution Pen-injector; 0 6mg SQ daily for 1 week   increase by 0 6mg weekly to 3mg daily if tolerated; Therapy: 18LWT6106 to (Last JY:35BHA9227)  Requested for: 51XYQ2025 Ordered    The medication list was reviewed and updated today  Allergies    1  No Known Drug Allergies    Vitals  Signs    Heart Rate: 57  Systolic: 926  Diastolic: 74  Height: 5 ft 9 in  Weight: 314 lb 6 oz  BMI Calculated: 46 43  BSA Calculated: 2 5  O2 Saturation: 97    Physical Exam    Constitutional   General appearance: No acute distress, well appearing and well nourished  morbidly obese  Head and Face   Head and face: Normal     Eyes   Conjunctiva and lids: No erythema, swelling or discharge  Ears, Nose, Mouth, and Throat   Otoscopic examination: Tympanic membranes translucent with normal light reflex  Canals patent without erythema  Oropharynx: Normal with no erythema, edema, exudate or lesions  Neck   Neck: Supple, symmetric, trachea midline, no masses  Thyroid: Normal, no thyromegaly  Pulmonary   Respiratory effort: No increased work of breathing or signs of respiratory distress  Auscultation of lungs: Clear to auscultation  Cardiovascular   Auscultation of heart: Normal rate and rhythm, normal S1 and S2, no murmurs  Abdomen   Abdomen: Non-tender, no masses  Lymphatic   Palpation of lymph nodes in neck: No lymphadenopathy  Skin soft movable mass, subcutaneous in the middle of the back  Psychiatric   Orientation to person, place and time: Normal     Mood and affect: Normal        Assessment    1  Preop examination (V72 84) (Z01 818)   2  Localized primary osteoarthritis of right shoulder region (715 11) (M19 011)   3  Morbid obesity (278 01) (E66 01)   4  Obstructive sleep apnea (327 23) (G47 33)   5  Mass of skin of back (782 2) (R22 2)    Plan  Morbid obesity    · Saxenda 18 MG/3ML Subcutaneous Solution Pen-injector; 0 6mg SQ daily for 1  week increase by 0 6mg weekly to 3mg daily if tolerated  Preop examination    · EKG/ECG- POC; Status:Complete;   Done: 93NCW0894 02:27PM    Discussion/Summary  Surgical Clearance: He is at a LOW TO MODERATE risk from a cardiovascular standpoint at this time without any additional cardiac testing  Reevaluation needed, if he should present with symptoms prior to surgery/procedure  Comments: Emmie Sandra Medically stable for surgery; blood work recently done not available at this time  EKG looks fine  Will send Pramod Masker again to see if this is available at the pharmacy at this time  If it is, he can start it now  JAZMYN suing CPAP  Lump on the skin on his back-sebaceous cyst vs lipoma  rto 4months  The patient was counseled regarding impressions  Possible side effects of new medications were reviewed with the patient/guardian today  The treatment plan was reviewed with the patient/guardian  The patient/guardian understands and agrees with the treatment plan      End of Encounter Meds    1  Naproxen 500 MG Oral Tablet; TAKE 1-2 TABLET EVERY 12 HOURS AS NEEDED FOR   PAIN;   Therapy: 39WSA2149 to (Alejandra Payne)  Requested for: 45BPF8905; Last   Rx:10Oct2017 Ordered    2  Cialis 20 MG Oral Tablet; take 1 tablet 1 hour before activity as needed; Therapy: 48NRH0997 to (66 216 78 09)  Requested for: 34AEB8413; Last   Rx:01Abp0976 Ordered    3  NovoFine 32G X 6 MM Miscellaneous; Use once a day with Saxenda; Therapy: 63KCO5965 to (Evaluate:01Jan2018)  Requested for: 73HRU4268; Last   Rx:02Nov2017 Ordered   4  Saxenda 18 MG/3ML Subcutaneous Solution Pen-injector; 0 6mg SQ daily for 1 week   increase by 0 6mg weekly to 3mg daily if tolerated; Therapy: 31XPJ2425 to (Last Rx:13Lmi7088)  Requested for: 38ZEQ4888 Ordered    5  Aspirin  MG Oral Tablet Delayed Release;    Therapy: (Recorded:11Aug2014) to Recorded   6  [address]; Therapy: (Recorded:30Mar2017) to Recorded   7  Probiotic CAPS;    Therapy: (Recorded:30Mar2017) to Recorded    Signatures   Electronically signed by : KRIS Lancaster ; Dec  7 2017  2:36PM EST                       (Author)

## 2018-01-23 NOTE — MISCELLANEOUS
Assessment    1  Chronic right shoulder pain (719 41,338 29) (M25 511,G89 29)   2  Morbid obesity (278 01) (E66 01)   3  History of Elevated serum creatinine (790 99) (R79 89)    Discussion/Summary  Discussion Summary:   Doing well post op  Cont Saxenda  Even BP seems to be ebtter  RTO as scheduled in April  Counseling Documentation With Imm: The patient was counseled regarding impressions  Medication SE Review and Pt Understands Tx: Possible side effects of new medications were reviewed with the patient/guardian today  The treatment plan was reviewed with the patient/guardian  The patient/guardian understands and agrees with the treatment plan      Chief Complaint  Chief Complaint Free Text Note Form: TCM      History of Present Illness  TCM Communication [facility]: The patient is being contacted for follow-up after hospitalization  Hospital records were reviewed  He was hospitalized at [place] [address]  The date of admission: 01/09/2018, date of discharge: 01/10/2018  Diagnosis: Primary osteoarthritis of right shoulder  He was discharged to home  Medications were not reviewed today  He scheduled a follow up appointment  Symptoms: incisional pain  Counseling was provided to the patient  Communication performed and completed by Jina Party   HPI: s/p right shoulder arthroplasty on 1/9  He is doing well post op  Taking Naproxen prn  Happy to report that he has lost 30lbs since starting Saxenda   Mild nausea, gets the early feeling of satiety  No vomiting  Happy with weight loss         Review of Systems  Complete-Male:   Constitutional: no fever and no chills  ENT: scratchy throat  Cardiovascular: occ CP since surgery, but no palpitations  Respiratory: no shortness of breath and no cough  Gastrointestinal: no abdominal pain, no constipation and no diarrhea  Musculoskeletal: as noted in HPI  Active Problems    1  Chronic low back pain (724 2,338 29) (M54 5,G89 29)   2   Chronic right shoulder pain (454 48,410 04) (M25 511,G89 29)   3  DM (diabetes mellitus screen) (V77 1) (Z13 1)   4  Dyslipidemia (272 4) (E78 5)   5  Erectile dysfunction (607 84) (N52 9)   6  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 3) (M47 816)   7  Herniation of lumbar intervertebral disc with radiculopathy (722 10) (M51 16)   8  Impaired fasting glucose (790 21) (R73 01)   9  Localized primary osteoarthritis of right shoulder region (715 11) (M19 011)   10  Lumbar spinal stenosis (724 02) (M48 061)   11  Mass of skin of back (782 2) (R22 2)   12  Morbid obesity (278 01) (E66 01)   13  Need for hepatitis C screening test (V73 89) (Z11 59)   14  Need for prophylactic vaccination and inoculation against influenza (V04 81) (Z23)   15  Need for Tdap vaccination (V06 1) (Z23)   16  Obstructive sleep apnea (327 23) (G47 33)   17  Palpitations (785 1) (R00 2)   18  Right hip pain (719 45) (M25 551)   19  Screen for colon cancer (V76 51) (Z12 11)   20  Screening cholesterol level (V77 91) (Z13 220)   21  Screening PSA (prostate specific antigen) (V76 44) (Z12 5)   22  Submandibular lymphadenopathy (785 6) (R59 0)    Past Medical History    1  History of Bone pain (733 90) (M89 8X9)   2  History of Elevated serum creatinine (790 99) (R79 89)   3  History of acute bronchitis (V12 69) (Z87 09)   4  History of traveler's diarrhea (V12 79) (Z86 19)   5  No pertinent past medical history   6  History of No pertinent past surgical history   7  History of Preop examination (V72 84) (Z01 818)   8  History of Tinnitus, left (388 30) (H93 12)    Surgical History    1  Denied: History Of Prior Surgery   2  History of Shoulder Arthroplasty  Surgical History Reviewed: The surgical history was reviewed and updated today  Family History  Mother    1  Family history of    2  Family history of cardiac disorder (V17 49) (Z82 49)  Father    3  Family history of    4   Family history of lung cancer (V16 1) (Z80 1)  Family History Reviewed: The family history was reviewed and updated today  Social History    · Assistive Devices: CPAP   · Never a smoker   · No illicit drug use   · Social alcohol use (Z78 9)  Social History Reviewed: The social history was reviewed and updated today  Current Meds   1  Aspirin  MG Oral Tablet Delayed Release; Therapy: (Recorded:11Aug2014) to Recorded   2  Cialis 20 MG Oral Tablet; take 1 tablet 1 hour before activity as needed; Therapy: 98BYN0607 to (95 371756)  Requested for: 86BZH9944; Last   Rx:08Iat6611 Ordered   3  [facility]; Therapy: (Recorded:30Mar2017) to Recorded   4  Naproxen 500 MG Oral Tablet; TAKE 1-2 TABLET EVERY 12 HOURS AS NEEDED FOR   PAIN;   Therapy: 39TGU8982 to (Elayne Rangel)  Requested for: 81HKY4108; Last   Rx:10Oct2017 Ordered   5  NovoFine 32G X 6 MM Miscellaneous; Use once a day with Saxenda; Therapy: 64FPE0652 to (Evaluate:01Jan2018)  Requested for: 86BQL7741; Last   Rx:02Nov2017 Ordered   6  Probiotic CAPS; Therapy: (Recorded:30Mar2017) to Recorded   7  Saxenda 18 MG/3ML Subcutaneous Solution Pen-injector; 0 6mg SQ daily for 1 week   increase by 0 6mg weekly to 3mg daily if tolerated; Therapy: 40EIN7180 to (Last Rx:72Sno2984)  Requested for: 23Eyx4538 Ordered  Medication List Reviewed: The medication list was reviewed and updated today  Allergies    1  No Known Drug Allergies    Physical Exam    Constitutional   General appearance: No acute distress, well appearing and well nourished  Pulmonary   Respiratory effort: No increased work of breathing or signs of respiratory distress  Auscultation of lungs: Clear to auscultation, equal breath sounds bilaterally, no wheezes, no rales, no rhonci  Cardiovascular   Auscultation of heart: Normal rate and rhythm, normal S1 and S2, without murmurs      Examination of extremities for edema and/or varicosities: Normal     Musculoskeletal   Gait and station: Normal   resolving helatoma on the right upper arm; surgical site is clean, 29 staples, wound intact  Future Appointments    Date/Time Provider Specialty Site   01/22/2018 08:00 AM Marvin Marin, [facility]   04/13/2018 08:00 AM KRIS Marroquin   Internal Medicine [facility]     Signatures   Electronically signed by : Lydia Ugalde, ; Derick 10 2018  2:40PM EST                       (Author)    Electronically signed by : KRIS Parada ; Jan 16 2018 12:34PM EST                       (Author)

## 2018-01-25 ENCOUNTER — APPOINTMENT (OUTPATIENT)
Dept: PHYSICAL THERAPY | Facility: CLINIC | Age: 63
End: 2018-01-25
Payer: COMMERCIAL

## 2018-01-29 ENCOUNTER — OFFICE VISIT (OUTPATIENT)
Dept: PHYSICAL THERAPY | Facility: CLINIC | Age: 63
End: 2018-01-29
Payer: COMMERCIAL

## 2018-01-29 DIAGNOSIS — Z96.611 STATUS POST TOTAL SHOULDER ARTHROPLASTY, RIGHT: Primary | ICD-10-CM

## 2018-01-29 DIAGNOSIS — M19.011 ARTHRITIS OF RIGHT SHOULDER REGION: ICD-10-CM

## 2018-01-29 PROCEDURE — 97110 THERAPEUTIC EXERCISES: CPT | Performed by: PHYSICAL THERAPIST

## 2018-01-29 PROCEDURE — 97140 MANUAL THERAPY 1/> REGIONS: CPT | Performed by: PHYSICAL THERAPIST

## 2018-01-29 NOTE — PROGRESS NOTES
Daily Note     Today's date: 2018  Patient name: Ирина Santana  : 1955  MRN: [id number]  Referring provider: Cami Ta MD  Dx:   Encounter Diagnoses   Name Primary?  Status post total shoulder arthroplasty, right Yes    Arthritis of right shoulder region                   Subjective: He states he is feeling quite well today although he is still having difficulty sleeping on his side  Objective: See treatment diary below      Assessment: Tolerated treatment well  Patient had some stretch pain at 90 flexion PROM  Plan: Continue per plan of care  and will be 1x/wk next week then 2x week  Assess patient's use of pillows A/P when lying on his side  Precautions:  Follow post-op protocol    Daily Treatment Diary     Manual              PROM 15 min                                                                    Exercise Diary              Pendulums 20x            Wrist AROM 20x            Elbow AROM 20x            Scap retractions 20x            gripping 3 min            UT/LV stretch 3 :20            Isometrics- flex/ER shown for week 3 :05 x10                                                                                                                                                                                         Modalities              CP

## 2018-02-02 ENCOUNTER — APPOINTMENT (OUTPATIENT)
Dept: PHYSICAL THERAPY | Facility: CLINIC | Age: 63
End: 2018-02-02
Payer: COMMERCIAL

## 2018-02-06 ENCOUNTER — OFFICE VISIT (OUTPATIENT)
Dept: PHYSICAL THERAPY | Facility: CLINIC | Age: 63
End: 2018-02-06
Payer: COMMERCIAL

## 2018-02-06 DIAGNOSIS — Z96.611 STATUS POST TOTAL SHOULDER ARTHROPLASTY, RIGHT: Primary | ICD-10-CM

## 2018-02-06 DIAGNOSIS — M19.011 ARTHRITIS OF RIGHT SHOULDER REGION: ICD-10-CM

## 2018-02-06 PROCEDURE — 97140 MANUAL THERAPY 1/> REGIONS: CPT | Performed by: PHYSICAL THERAPIST

## 2018-02-06 PROCEDURE — 97110 THERAPEUTIC EXERCISES: CPT | Performed by: PHYSICAL THERAPIST

## 2018-02-06 NOTE — PROGRESS NOTES
Daily Note      Today's date: 2018  Patient name: Ruby Quarles  : 1955  MRN: [id number]  Referring provider: Michelle Schafer MD  Dx:        Encounter Diagnoses   Name Primary?  Status post total shoulder arthroplasty, right Yes    Arthritis of right shoulder region              Subjective: He states he is feeling quite well and improving his soreness and sleeping  Objective: See treatment diary below        Assessment: Tolerated treatment well  Patient had some stretch pain at 110 flexion/ abd PROM  Tolerated resisted scap retraction well  Plan: Continue per plan of care  Precautions:  Follow post-op protocol     Daily Treatment Diary      Manual                     PROM 15 min  10 min                    resisted scap retraction/depression    5 min                                                                                                 Exercise Diary                     Pendulums 20x  20x                   Wrist AROM 20x D/c                    Elbow AROM 20x                     Scap retractions 20x  20x                   gripping 3 min                     UT/LV stretch 3 :20                     Isometrics- flex/ER/add shown for week 3 :05 x10  :05x 20x                    flexion self assisted PROM   10x :10                                                                                                                                                                                                                                                                                                                          Modalities                        CP

## 2018-02-08 ENCOUNTER — OFFICE VISIT (OUTPATIENT)
Dept: PHYSICAL THERAPY | Facility: CLINIC | Age: 63
End: 2018-02-08
Payer: COMMERCIAL

## 2018-02-08 DIAGNOSIS — Z96.611 STATUS POST TOTAL SHOULDER ARTHROPLASTY, RIGHT: Primary | ICD-10-CM

## 2018-02-08 DIAGNOSIS — M19.011 ARTHRITIS OF RIGHT SHOULDER REGION: ICD-10-CM

## 2018-02-08 PROCEDURE — 97110 THERAPEUTIC EXERCISES: CPT | Performed by: PHYSICAL THERAPIST

## 2018-02-08 PROCEDURE — 97140 MANUAL THERAPY 1/> REGIONS: CPT | Performed by: PHYSICAL THERAPIST

## 2018-02-08 NOTE — PROGRESS NOTES
Daily Note      Today's date: 2018  Patient name: Dee Padilla  : 1955  MRN: [id number]  Referring provider: Kash Abraham MD  Dx:        Encounter Diagnoses   Name Primary?  Status post total shoulder arthroplasty, right Yes    Arthritis of right shoulder region              Subjective: He states he had a day soreness after last visit but is feeling food today  Objective: See treatment diary below        Assessment: Tolerated treatment well  Patient had some stretch pain at 120 flexion/ abd PROM  Plan: Continue per plan of care  Precautions:  Follow post-op protocol     Daily Treatment Diary      Manual                   PROM 15 min  10 min  20 min                  resisted scap retraction/depression    5 min                                                                                                 Exercise Diary                   Pendulums 20x  20x                   Wrist AROM 20x D/c                    Elbow AROM 20x                     Scap retractions 20x  20x  20x                 gripping 3 min                     UT/LV stretch 3 :20                     Isometrics- flex/ER/add shown for week 3 :05 x10  :05x 20x  :05 x20                   flexion self assisted PROM   10x :10   10x :10                                                                                                                                                                                                                                                                                                                       Modalities                        CP

## 2018-02-13 ENCOUNTER — OFFICE VISIT (OUTPATIENT)
Dept: PHYSICAL THERAPY | Facility: CLINIC | Age: 63
End: 2018-02-13
Payer: COMMERCIAL

## 2018-02-13 DIAGNOSIS — M19.011 ARTHRITIS OF RIGHT SHOULDER REGION: ICD-10-CM

## 2018-02-13 DIAGNOSIS — Z96.611 STATUS POST TOTAL SHOULDER ARTHROPLASTY, RIGHT: Primary | ICD-10-CM

## 2018-02-13 PROCEDURE — 97110 THERAPEUTIC EXERCISES: CPT | Performed by: PHYSICAL THERAPIST

## 2018-02-13 PROCEDURE — 97140 MANUAL THERAPY 1/> REGIONS: CPT | Performed by: PHYSICAL THERAPIST

## 2018-02-13 NOTE — PROGRESS NOTES
Daily Note      Today's date: 2018  Patient name: Jason Koo  : 1955  MRN: [id number]  Referring provider: Shaheed Martines MD  Dx:        Encounter Diagnoses   Name Primary?  Status post total shoulder arthroplasty, right Yes    Arthritis of right shoulder region              Subjective: He states he is feeling pretty good but he would like better ROM  Objective: See treatment diary below         Assessment: Tolerated treatment well  Patient had some stretch pain at 130 flexion of self ROM  PROM abduction a bit more limited and added in [facility] mobs     Plan: Continue per plan of care  Precautions:  Follow post-op protocol     Daily Treatment Diary      Manual                 PROM and GH post mobs grade 2-3  15 min  10 min  20 min  20 min                resisted scap retraction/depression    5 min    5 min                                                                                             Exercise Diary                 Pendulums 20x  20x                   Wrist AROM 20x D/c                    Elbow AROM 20x                     Scap retractions 20x  20x  20x  20x               gripping 3 min      3 min               UT/LV stretch 3 :20      3 :20               Isometrics- flex/ER/add shown for week 3 :05 x10  :05x 20x  :05 x20   :05 x20                flexion self assisted PROM   10x :10   10x :10  10x :10                                                                                                                                                                                                                                                                                                                     Modalities                        CP

## 2018-02-15 ENCOUNTER — OFFICE VISIT (OUTPATIENT)
Dept: PHYSICAL THERAPY | Facility: CLINIC | Age: 63
End: 2018-02-15
Payer: COMMERCIAL

## 2018-02-15 DIAGNOSIS — M19.011 ARTHRITIS OF RIGHT SHOULDER REGION: ICD-10-CM

## 2018-02-15 DIAGNOSIS — Z96.611 STATUS POST TOTAL SHOULDER ARTHROPLASTY, RIGHT: Primary | ICD-10-CM

## 2018-02-15 PROCEDURE — 97140 MANUAL THERAPY 1/> REGIONS: CPT | Performed by: PHYSICAL THERAPIST

## 2018-02-15 PROCEDURE — 97110 THERAPEUTIC EXERCISES: CPT | Performed by: PHYSICAL THERAPIST

## 2018-02-15 NOTE — PROGRESS NOTES
Daily Note      Today's date: 2/15/2018  Patient name: Chelsie Ortega  : 1955  MRN: [id number]  Referring provider: Dian Robertson MD  Dx:        Encounter Diagnoses   Name Primary?  Status post total shoulder arthroplasty, right Yes    Arthritis of right shoulder region              Subjective: He states he is feeling sore but has been stretching arm still  Objective: See treatment diary below         Assessment: Tolerated treatment well  Patient had some stretch pain at 140 flexion of self ROM  PROM was sore for patient this visit  Advance with Stanley Mock       Plan: Continue per plan of care  Precautions:  Follow post-op protocol     Daily Treatment Diary      Manual  1/29  2/6  2/8  2/13  2/15             PROM and GH post mobs grade 2-3  15 min  10 min  20 min  20 min  20 min              resisted scap retraction/depression    5 min    5 min                                                                                             Exercise Diary  1/29  2/6  2/8  2/13  2/15             Pendulums 20x  20x                   Wrist AROM 20x D/c                    Elbow AROM 20x                     Scap retractions 20x  20x  20x  20x  20x             gripping 3 min      3 min               UT/LV stretch 3 :20      3 :20  3 :20             Isometrics- flex/ER/add shown for week 3 :05 x10  :05x 20x  :05 x20   :05 x20  :05 x 20              flexion self assisted PROM   10x :10   10x :10  10x :10  10 x :10                                                                                                                                                                                                                                                                                                                   Modalities                        CP

## 2018-02-19 DIAGNOSIS — N52.9 ERECTILE DYSFUNCTION, UNSPECIFIED ERECTILE DYSFUNCTION TYPE: Primary | ICD-10-CM

## 2018-02-20 ENCOUNTER — EVALUATION (OUTPATIENT)
Dept: PHYSICAL THERAPY | Facility: CLINIC | Age: 63
End: 2018-02-20
Payer: COMMERCIAL

## 2018-02-20 DIAGNOSIS — M19.011 ARTHRITIS OF RIGHT SHOULDER REGION: ICD-10-CM

## 2018-02-20 DIAGNOSIS — Z96.611 STATUS POST TOTAL SHOULDER ARTHROPLASTY, RIGHT: Primary | ICD-10-CM

## 2018-02-20 PROCEDURE — G8991 OTHER PT/OT GOAL STATUS: HCPCS | Performed by: PHYSICAL THERAPIST

## 2018-02-20 PROCEDURE — G8990 OTHER PT/OT CURRENT STATUS: HCPCS | Performed by: PHYSICAL THERAPIST

## 2018-02-20 PROCEDURE — 97140 MANUAL THERAPY 1/> REGIONS: CPT | Performed by: PHYSICAL THERAPIST

## 2018-02-20 PROCEDURE — 97110 THERAPEUTIC EXERCISES: CPT | Performed by: PHYSICAL THERAPIST

## 2018-02-20 RX ORDER — TADALAFIL 20 MG
TABLET ORAL
Qty: 30 TABLET | Refills: 1 | Status: SHIPPED | OUTPATIENT
Start: 2018-02-20 | End: 2019-01-28 | Stop reason: SDUPTHER

## 2018-02-20 NOTE — PROGRESS NOTES
PT Re-Evaluation     Today's date: 2018  Patient name: Lo Leblanc  : 1955  MRN: [id number]  Referring provider: Jani Thacker MD  Dx:   Encounter Diagnosis     ICD-10-CM    1  Status post total shoulder arthroplasty, right Z96 611    2  Arthritis of right shoulder region M19 011                   Assessment  Impairments: abnormal or restricted ROM and impaired physical strength    Assessment details: Patient presents with decreased pain and great improvements in ROM and right on track per protocol  He has primary limitations in ER ROM as expected due to protecting subscap  He would benefit from continued PT to continue through protocol  Understanding of Dx/Px/POC: good   Prognosis: good    Goals  STGs  1  Decrease pain by 50% in 2-4 weeks  - met  2  Improve shoulder PROM to full in 2-4 weeks  3  Improve shoulder strength by 1/3 grade in 2-4 weeks  LTGs  1  Decrease pain by 100% in 6-8 weeks  2  Improve to reaching overhead #2-3 in 8 weeks  3  Perform job activities without pain in 6-8 weeks  Plan  Patient would benefit from: skilled PT  Planned therapy interventions: manual therapy, therapeutic exercise, stretching, strengthening and neuromuscular re-education  Frequency: 2x week  Duration in weeks: 8  Treatment plan discussed with: patient        Subjective Evaluation    History of Present Illness  Mechanism of injury: He states he is having difficulty reaching overhead and states he only has soreness not too much pain     Pain  At best pain ratin  At worst pain ratin          Objective     Active Range of Motion   Left Shoulder   Flexion: 172 degrees   Abduction: 175 degrees   External rotation 0°: 60 degrees   External rotation BTH: C7     Right Shoulder   Flexion: 102 degrees   Abduction: 120 degrees   External rotation BTH: C2     Additional Active Range of Motion Details  Elbow strength: WNL    Passive Range of Motion     Right Shoulder   Flexion: 140 degrees Abduction: 115 degrees   External rotation 0°: 30 degrees           Precautions: See protocol    Daily Treatment Diary     Manual  2/20            PROM- to tolerance, light ER 15            PA GH grade 2-3 mobs 5  min                                                       Exercise Diary  2/20            Pulleys 3 min            Isometrics- 4 way 10x :10            AAROM supine wand- flex 2x10            Wall slides- flex/abd 10x :05            Pball flexion  20x                                                                                                                                                                                                                   Modalities

## 2018-02-22 ENCOUNTER — TELEPHONE (OUTPATIENT)
Dept: INTERNAL MEDICINE CLINIC | Facility: CLINIC | Age: 63
End: 2018-02-22

## 2018-02-22 ENCOUNTER — OFFICE VISIT (OUTPATIENT)
Dept: PHYSICAL THERAPY | Facility: CLINIC | Age: 63
End: 2018-02-22
Payer: COMMERCIAL

## 2018-02-22 DIAGNOSIS — Z96.611 STATUS POST TOTAL SHOULDER ARTHROPLASTY, RIGHT: Primary | ICD-10-CM

## 2018-02-22 DIAGNOSIS — R22.2 MASS OF SKIN OF BACK: Primary | ICD-10-CM

## 2018-02-22 DIAGNOSIS — M19.011 ARTHRITIS OF RIGHT SHOULDER REGION: ICD-10-CM

## 2018-02-22 PROCEDURE — 97112 NEUROMUSCULAR REEDUCATION: CPT | Performed by: PHYSICAL THERAPIST

## 2018-02-22 PROCEDURE — 97140 MANUAL THERAPY 1/> REGIONS: CPT | Performed by: PHYSICAL THERAPIST

## 2018-02-22 PROCEDURE — 97110 THERAPEUTIC EXERCISES: CPT | Performed by: PHYSICAL THERAPIST

## 2018-02-22 NOTE — PROGRESS NOTES
Daily Note     Today's date: 2018  Patient name: Dudley Mena  : 1955  MRN: [id number]  Referring provider: Charleen Christensen MD  Dx:   Encounter Diagnosis     ICD-10-CM    1  Status post total shoulder arthroplasty, right Z96 611    2  Arthritis of right shoulder region M19 011                   Subjective: He states he did feel some soreness after last visit but is feeling improvement of the shoulder  Objective: See treatment diary below      Assessment: Tolerated treatment fair  Patient had pain with AAROM flexion this visit  Plan: Continue per plan of care       Precautions: See protocol    Daily Treatment Diary     Manual             PROM- to tolerance, light ER 15 10           PA GH grade 2-3 mobs 5  min 5 min                                                      Exercise Diary             Pulleys 3 min 3 min           Isometrics- 4 way 10x :10 10x :10           AAROM supine wand- flex 2x10 2x10           Wall slides- flex/abd 10x :05 10x :05           Pball flexion  20x 20x           Finger ladder  10x

## 2018-02-27 ENCOUNTER — OFFICE VISIT (OUTPATIENT)
Dept: PHYSICAL THERAPY | Facility: CLINIC | Age: 63
End: 2018-02-27
Payer: COMMERCIAL

## 2018-02-27 DIAGNOSIS — Z96.611 STATUS POST TOTAL SHOULDER ARTHROPLASTY, RIGHT: Primary | ICD-10-CM

## 2018-02-27 PROCEDURE — 97140 MANUAL THERAPY 1/> REGIONS: CPT | Performed by: PHYSICAL THERAPIST

## 2018-02-27 PROCEDURE — 97110 THERAPEUTIC EXERCISES: CPT | Performed by: PHYSICAL THERAPIST

## 2018-02-27 NOTE — PROGRESS NOTES
Daily Note     Today's date: 2018  Patient name: Sue Ricci  : 1955  MRN: [id number]  Referring provider: Noe Sanchez MD  Dx:   Encounter Diagnosis     ICD-10-CM    1  Status post total shoulder arthroplasty, right Z96 611                   Subjective: He states he did feel some soreness after last visit but is feeling improvement of the shoulder  Objective: See treatment diary below      Assessment: Tolerated treatment fair  Patient had pain with AAROM flexion this visit  Plan: Continue per plan of care       Precautions: See protocol    Daily Treatment Diary     Manual            PROM- to tolerance, light ER 15 10 10          PA GH grade 2-3 mobs 5  min 5 min 5 min                                                     Exercise Diary            Pulleys 3 min 3 min 3/3          Isometrics- 4 way 10x :10 10x :10 10x:10          AAROM supine wand- flex 2x10 2x10 2x10          Wall slides- flex/abd 10x :05 10x :05 10 x :05          Pball flexion  20x 20x 20x          Finger ladder  10x 10x          Wall ER ST   5 x :20          AROM flex/abd             S/L ER

## 2018-03-01 ENCOUNTER — OFFICE VISIT (OUTPATIENT)
Dept: PHYSICAL THERAPY | Facility: CLINIC | Age: 63
End: 2018-03-01
Payer: COMMERCIAL

## 2018-03-01 DIAGNOSIS — Z96.611 STATUS POST TOTAL SHOULDER ARTHROPLASTY, RIGHT: Primary | ICD-10-CM

## 2018-03-01 PROCEDURE — 97112 NEUROMUSCULAR REEDUCATION: CPT | Performed by: PHYSICAL THERAPIST

## 2018-03-01 PROCEDURE — 97140 MANUAL THERAPY 1/> REGIONS: CPT | Performed by: PHYSICAL THERAPIST

## 2018-03-01 PROCEDURE — 97110 THERAPEUTIC EXERCISES: CPT | Performed by: PHYSICAL THERAPIST

## 2018-03-01 NOTE — PROGRESS NOTES
Daily Note     Today's date: 3/1/2018  Patient name: Jesika Valdez  : 1955  MRN: [id number]  Referring provider: Nicolas Ramires MD  Dx:   Encounter Diagnosis     ICD-10-CM    1  Status post total shoulder arthroplasty, right Z96 611                   Subjective: He states he is feeling quite sore due to performing overhead activity  Objective: See treatment diary below      Assessment: Tolerated treatment fair  Patient was given AROM this visit and tolerated well  PROM in IR trialed this visit  Plan: Continue per plan of care       Precautions: See protocol    Daily Treatment Diary     Manual  2/20 2/22 2/27 3/1         PROM- to tolerance, light ER 15 10 10 10 min         PA GH grade 2-3 mobs 5  min 5 min 5 min 5 min                                                    Exercise Diary  2/20 2/22 2/27 3/1         Pulleys 3 min 3 min 3/3 3/3         Isometrics- 4 way 10x :10 10x :10 10x:10 10x :10 D/c        AAROM supine wand- flex 2x10 2x10 2x10          Wall slides- flex/abd 10x :05 10x :05 10 x :05 10x :05         Pball flexion  20x 20x 20x          Finger ladder  10x 10x 10x         Wall ER ST   5 x :20 5 x :20         AROM flex/abd    2x10 pain         S/L ER    2x10         TB ER RTB    2x10         Bicep curl- #5             Tricep TB ext

## 2018-03-06 ENCOUNTER — OFFICE VISIT (OUTPATIENT)
Dept: PHYSICAL THERAPY | Facility: CLINIC | Age: 63
End: 2018-03-06
Payer: COMMERCIAL

## 2018-03-06 DIAGNOSIS — Z96.611 STATUS POST TOTAL SHOULDER ARTHROPLASTY, RIGHT: Primary | ICD-10-CM

## 2018-03-06 DIAGNOSIS — M19.011 ARTHRITIS OF RIGHT SHOULDER REGION: ICD-10-CM

## 2018-03-06 PROCEDURE — 97110 THERAPEUTIC EXERCISES: CPT | Performed by: PHYSICAL THERAPIST

## 2018-03-06 PROCEDURE — 97140 MANUAL THERAPY 1/> REGIONS: CPT | Performed by: PHYSICAL THERAPIST

## 2018-03-06 NOTE — PROGRESS NOTES
Daily Note     Today's date: 3/6/2018  Patient name: Sheyla Salas  : 1955  MRN: [id number]  Referring provider: Víctor Hoang MD  Dx:   Encounter Diagnosis     ICD-10-CM    1  Status post total shoulder arthroplasty, right Z96 611    2  Arthritis of right shoulder region M19 011                   Subjective: He states he is feeling pretty good today and does not have too much soreness  Objective: See treatment diary below      Assessment: Tolerated treatment fair  Patient was given AROM this visit and tolerated well  PROM in IR trialed this visit and will progress with continued stretching into IR nv          Plan: Continue per plan of care  He will be away for a week next week       Precautions: See protocol    Daily Treatment Diary     Manual  2/20 2/22 2/27 3/1 3        PROM- to tolerance,  15 10 10 10 min 15 min        PA GH grade 2-3 mobs 5  min 5 min 5 min 5 min np                                                   Exercise Diary  2/20 2/22 2/27 3/1 3/6        Pulleys 3 min 3 min 3/3 3/3 3/3        Isometrics- 4 way 10x :10 10x :10 10x:10 10x :10 D/c        AAROM supine wand- flex 2x10 2x10 2x10          Wall slides- flex/abd 10x :05 10x :05 10 x :05 10x :05 10x :05                     Finger ladder  10x 10x 10x 10x        Wall ER ST   5 x :20 5 x :20 5x :20        AROM flex/abd    2x10 pain 2x10        S/L ER    2x10 2x10        TB ER RTB    2x10 2x10        Bicep curl- #5     2x20        Tricep TB ext     2x10        IR strap ST nv

## 2018-03-08 ENCOUNTER — OFFICE VISIT (OUTPATIENT)
Dept: PHYSICAL THERAPY | Facility: CLINIC | Age: 63
End: 2018-03-08
Payer: COMMERCIAL

## 2018-03-08 DIAGNOSIS — M19.011 ARTHRITIS OF RIGHT SHOULDER REGION: ICD-10-CM

## 2018-03-08 DIAGNOSIS — Z96.611 STATUS POST TOTAL SHOULDER ARTHROPLASTY, RIGHT: Primary | ICD-10-CM

## 2018-03-08 PROCEDURE — 97140 MANUAL THERAPY 1/> REGIONS: CPT | Performed by: PHYSICAL THERAPIST

## 2018-03-08 PROCEDURE — 97112 NEUROMUSCULAR REEDUCATION: CPT | Performed by: PHYSICAL THERAPIST

## 2018-03-08 PROCEDURE — 97110 THERAPEUTIC EXERCISES: CPT | Performed by: PHYSICAL THERAPIST

## 2018-03-08 NOTE — PROGRESS NOTES
Daily Note     Today's date: 3/8/2018  Patient name: Adriane Gutierres  : 1955  MRN: [id number]  Referring provider: Patricia Ferraro MD  Dx:   Encounter Diagnosis     ICD-10-CM    1  Status post total shoulder arthroplasty, right Z96 611    2  Arthritis of right shoulder region M19 011                   Subjective: He states he is feeling pretty good today and does not have too much soreness  Objective: See treatment diary below      Assessment: Tolerated treatment fair  Patient was given AROM this visit and tolerated well  Performed mild IR stretching and had some discomfort      Plan: Continue per plan of care  He will be away for a week next week and return on Thursday    Precautions: See protocol    Daily Treatment Diary     Manual  2/20 2/22 2/27 3/1 3/6 3/8       PROM- to tolerance,  15 10 10 10 min 15 min 15 min       PA GH grade 2-3 mobs 5  min 5 min 5 min 5 min np                                                   Exercise Diary  2/20 2/22 2/27 3/1 3/6 3/8       Pulleys 3 min 3 min 3/3 3/3 3/3 3/3       Isometrics- 4 way 10x :10 10x :10 10x:10 10x :10 D/c        AAROM supine wand- flex 2x10 2x10 2x10          Wall slides- flex/abd 10x :05 10x :05 10 x :05 10x :05 10x :05                     Finger ladder  10x 10x 10x 10x 10x       Wall ER ST   5 x :20 5 x :20 5x :20 5x :20       AROM flex/abd #1    2x10 pain 2x10 2x10       S/L ER    2x10 2x10 2x10       TB ER RTB    2x10 2x10 2x10       Bicep curl- #5     2x20 2x10       Tricep TB ext     2x10 2x10       IR strap ST nv      :05x5       Wand ext      2x10

## 2018-03-09 ENCOUNTER — TELEPHONE (OUTPATIENT)
Dept: OTHER | Facility: HOSPITAL | Age: 63
End: 2018-03-09

## 2018-03-09 NOTE — TELEPHONE ENCOUNTER
Pt called in stating he had a shoulder replacement 1/09 and it is now healed; however when he went to the dentist they told him they would need him to be on a course of antibiotics before they could do so even though it's 2 months later (says he has to do this for the next 2 years?) so he is asking if you can send one into his pharmacy or write some type of letter stating it is not necessary or they will not clean his teeth

## 2018-03-09 NOTE — TELEPHONE ENCOUNTER
Who is the dentist? I can fax them a letter and a copy of the current guidelines that there is NO NEED for antibiotics before dental work for him  Has he had any joint infections before following dental surgery? What is he having done ?

## 2018-03-09 NOTE — TELEPHONE ENCOUNTER
Patient goes to Dr Sita Lowe and the fax number is [phone]  Patient is just having a routine teeth cleaning    Patient has never had problems in the past

## 2018-03-15 ENCOUNTER — OFFICE VISIT (OUTPATIENT)
Dept: PHYSICAL THERAPY | Facility: CLINIC | Age: 63
End: 2018-03-15
Payer: COMMERCIAL

## 2018-03-15 DIAGNOSIS — M19.011 ARTHRITIS OF RIGHT SHOULDER REGION: ICD-10-CM

## 2018-03-15 DIAGNOSIS — Z96.611 STATUS POST TOTAL SHOULDER ARTHROPLASTY, RIGHT: Primary | ICD-10-CM

## 2018-03-15 PROCEDURE — 97112 NEUROMUSCULAR REEDUCATION: CPT | Performed by: PHYSICAL THERAPIST

## 2018-03-15 PROCEDURE — 97110 THERAPEUTIC EXERCISES: CPT | Performed by: PHYSICAL THERAPIST

## 2018-03-15 PROCEDURE — 97140 MANUAL THERAPY 1/> REGIONS: CPT | Performed by: PHYSICAL THERAPIST

## 2018-03-15 NOTE — PROGRESS NOTES
Daily Note     Today's date: 3/15/2018  Patient name: Ruby Quarles  : 1955  MRN: [id number]  Referring provider: Michelle Schafer MD  Dx:   Encounter Diagnosis     ICD-10-CM    1  Status post total shoulder arthroplasty, right Z96 611    2  Arthritis of right shoulder region M19 011                   Subjective: He states he is feeling pretty good today and he was able to lift carry on in plane next week  Objective: See treatment diary below      Assessment: Tolerated treatment fair  Patient had difficulty with abd strength today  Plan: Continue per plan of care  He will be away for a week next week and return on Thursday    Precautions: See protocol    Daily Treatment Diary     Manual  2/20 2/22 2/27 3/1 3/6 3/8 3/15      PROM- to tolerance,  15 10 10 10 min 15 min 15 min 15 min      PA GH grade 2-3 mobs 5  min 5 min 5 min 5 min np                                                   Exercise Diary  2/20 2/22 2/27 3/1 3/6 3/8 3/15      Pulleys 3 min 3 min 3/3 3/3 3/3 3/3 3/3      Isometrics- 4 way 10x :10 10x :10 10x:10 10x :10 D/c        AAROM supine wand- flex 2x10 2x10 2x10          Wall slides- flex/abd 10x :05 10x :05 10 x :05 10x :05 10x :05                     Finger ladder  10x 10x 10x 10x 10x       Wall ER ST   5 x :20 5 x :20 5x :20 5x :20 5x :20      AROM flex/abd #2    2x10 pain 2x10 2x10 2x10      S/L ER #2    2x10 2x10 2x10 2x10      TB ER RTB    2x10 2x10 2x10 2x10      Bicep curl- #10-     2x20 2x10 2x10      Tricep TB ext     2x10 2x10 2x10      IR strap ST       :05x5 :05 x10      Wand ext      2x10 2x10

## 2018-03-20 ENCOUNTER — OFFICE VISIT (OUTPATIENT)
Dept: PHYSICAL THERAPY | Facility: CLINIC | Age: 63
End: 2018-03-20
Payer: COMMERCIAL

## 2018-03-20 DIAGNOSIS — Z96.611 STATUS POST TOTAL SHOULDER ARTHROPLASTY, RIGHT: Primary | ICD-10-CM

## 2018-03-20 DIAGNOSIS — M19.011 ARTHRITIS OF RIGHT SHOULDER REGION: ICD-10-CM

## 2018-03-20 PROCEDURE — 97112 NEUROMUSCULAR REEDUCATION: CPT | Performed by: PHYSICAL THERAPIST

## 2018-03-20 PROCEDURE — 97110 THERAPEUTIC EXERCISES: CPT | Performed by: PHYSICAL THERAPIST

## 2018-03-20 PROCEDURE — 97140 MANUAL THERAPY 1/> REGIONS: CPT | Performed by: PHYSICAL THERAPIST

## 2018-03-20 NOTE — PROGRESS NOTES
Daily Note     Today's date: 3/20/2018  Patient name: Hermelinda Burks  : 1955  MRN: [id number]  Referring provider: Jordon Brannon MD  Dx:   Encounter Diagnosis     ICD-10-CM    1  Status post total shoulder arthroplasty, right Z96 611    2  Arthritis of right shoulder region M19 011                   Subjective: He states he is feeling pretty good today and he was able to lift carry on in plane next week  Objective: See treatment diary below      Assessment: Tolerated treatment fair  Patient had difficulty with abd ROM today and had shakiness with PROM above 130  Plan: Continue per plan of care    1x/week  Precautions: See protocol    Daily Treatment Diary     Manual  2/20 2/22 2/27 3/1 3/6 3/8 3/15 3/20     PROM- to tolerance,  15 10 10 10 min 15 min 15 min 15 min 15 min     PA GH grade 2-3 mobs 5  min 5 min 5 min 5 min np                                                   Exercise Diary  2/20 2/22 2/27 3/1 3/6 3/8 3/15 3/20     Pulleys 3 min 3 min 3/3 33 33 3/3 3/3 3/3     Isometrics- 4 way 10x :10 10x :10 10x:10 10x :10 D/c        AAROM supine wand- flex 2x10 2x10 2x10          Wall slides- abd 10x :05 10x :05 10 x :05 10x :05 10x :05   10x :05                  Finger ladder  10x 10x 10x 10x 10x  10x     Wall ER ST   5 x :20 5 x :20 5x :20 5x :20 5x :20 5x :20     AROM flex/abd #2    2x10 pain 2x10 2x10 2x10 2x10     S/L ER #2    2x10 2x10 2x10 2x10 2x10     TB ER RTB    2x10 2x10 2x10 2x10 2x10     Bicep curl- #10-     2x20 2x10 2x10 2x10     Tricep TB ext GTB     2x10 2x10 2x10 2x10     IR strap ST       :05x5 :05 x10 10x :05     Wand ext      2x10 2x10 2x10

## 2018-03-22 ENCOUNTER — APPOINTMENT (OUTPATIENT)
Dept: PHYSICAL THERAPY | Facility: CLINIC | Age: 63
End: 2018-03-22
Payer: COMMERCIAL

## 2018-03-26 ENCOUNTER — HOSPITAL ENCOUNTER (OUTPATIENT)
Dept: RADIOLOGY | Facility: HOSPITAL | Age: 63
Discharge: HOME | End: 2018-03-26
Payer: COMMERCIAL

## 2018-03-26 ENCOUNTER — OFFICE VISIT (OUTPATIENT)
Dept: OBGYN CLINIC | Facility: HOSPITAL | Age: 63
End: 2018-03-26
Payer: COMMERCIAL

## 2018-03-26 VITALS
DIASTOLIC BLOOD PRESSURE: 90 MMHG | HEIGHT: 69 IN | BODY MASS INDEX: 37.8 KG/M2 | SYSTOLIC BLOOD PRESSURE: 142 MMHG | WEIGHT: 255.2 LBS | HEART RATE: 63 BPM

## 2018-03-26 DIAGNOSIS — M25.512 LEFT SHOULDER PAIN, UNSPECIFIED CHRONICITY: Primary | ICD-10-CM

## 2018-03-26 DIAGNOSIS — M25.512 LEFT SHOULDER PAIN, UNSPECIFIED CHRONICITY: ICD-10-CM

## 2018-03-26 PROCEDURE — 20610 DRAIN/INJ JOINT/BURSA W/O US: CPT | Performed by: PHYSICIAN ASSISTANT

## 2018-03-26 PROCEDURE — 99213 OFFICE O/P EST LOW 20 MIN: CPT | Performed by: PHYSICIAN ASSISTANT

## 2018-03-26 PROCEDURE — 73030 X-RAY EXAM OF SHOULDER: CPT

## 2018-03-26 RX ORDER — CEPHALEXIN 500 MG/1
CAPSULE ORAL
COMMUNITY
Start: 2018-02-27 | End: 2018-03-26 | Stop reason: ALTCHOICE

## 2018-03-26 RX ORDER — BIOTIN 5 MG
TABLET ORAL
COMMUNITY
End: 2018-09-05 | Stop reason: ALTCHOICE

## 2018-03-26 RX ORDER — BUPIVACAINE HYDROCHLORIDE 2.5 MG/ML
2 INJECTION, SOLUTION INFILTRATION; PERINEURAL
Status: COMPLETED | OUTPATIENT
Start: 2018-03-26 | End: 2018-03-26

## 2018-03-26 RX ORDER — BETAMETHASONE SODIUM PHOSPHATE AND BETAMETHASONE ACETATE 3; 3 MG/ML; MG/ML
6 INJECTION, SUSPENSION INTRA-ARTICULAR; INTRALESIONAL; INTRAMUSCULAR; SOFT TISSUE
Status: COMPLETED | OUTPATIENT
Start: 2018-03-26 | End: 2018-03-26

## 2018-03-26 RX ADMIN — BUPIVACAINE HYDROCHLORIDE 2 ML: 2.5 INJECTION, SOLUTION INFILTRATION; PERINEURAL at 10:09

## 2018-03-26 RX ADMIN — BETAMETHASONE SODIUM PHOSPHATE AND BETAMETHASONE ACETATE 6 MG: 3; 3 INJECTION, SUSPENSION INTRA-ARTICULAR; INTRALESIONAL; INTRAMUSCULAR; SOFT TISSUE at 10:09

## 2018-03-26 NOTE — PROGRESS NOTES
Assessment:       1  Left shoulder pain, unspecified chronicity          Plan:    Patient was seen by Dr Saray Hooks and myself  Patient is doing well postoperatively with his right shoulder  He will continue with PT and transition to home exercises  He will be released for work 4/2/2018  Regarding his left shoulder  Patient will do home PT exercises  Ice to should if he has discomfort in the next 24 hours  Follow up if not better in two months  All questions were addressed to the patient's satisfaction  Subjective:     Patient ID: Onelia Shane is a 58 y o  male  Chief Complaint: This 55-year-old male presents to the office complaining of left shoulder pain  He had a right total shoulder arthroplasty in January 2018 and is very happy with his progress with physical therapy  He denies any specific trauma; however, during his postop recovery for his right shoulder, he has noticed increased fatigued and pain with certain motions in his left shoulder  He denies any numbness and tingling  Shoulder Pain  Patient complaints of left shoulder pain  The pain is described as dull  The onset of the pain gradual for years  The pain occurs when active Location is global  No history of dislocation  Symptoms are aggravated by repetitive motions  Symptoms are diminished by  rest    No stiffness is reported  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 and he has been out of work since his right shoulder surgery  Social History     Occupational History    Not on file  Social History Main Topics    Smoking status: Never Smoker    Smokeless tobacco: Never Used    Alcohol use Yes      Comment: social    Drug use: No    Sexual activity: Not on file      Review of Systems   Respiratory: Negative  Negative for shortness of breath and wheezing  Musculoskeletal: Positive for arthralgias and myalgias  Skin: Negative  Neurological: Negative for weakness and numbness  Psychiatric/Behavioral: Negative  Objective:     Right Shoulder Exam   Right shoulder exam is normal     Tenderness   The patient is experiencing no tenderness  Range of Motion   The patient has normal right shoulder ROM  Muscle Strength   Abduction: 5/5   Internal Rotation: 5/5   External Rotation: 5/5   Supraspinatus: 5/5   Subscapularis: 5/5   Biceps: 5/5     Tests   Apprehension: negative  Cross Arm: negative  Drop Arm: negative  Hawkin's test: negative  Impingement: negative  Sulcus: absent    Other   Erythema: absent  Scars: present  Sensation: normal  Pulse: present      Left Shoulder Exam     Tenderness   The patient is experiencing no tenderness  Range of Motion   The patient has normal left shoulder ROM  Muscle Strength   Abduction: 5/5   Internal Rotation: 5/5   External Rotation: 5/5   Supraspinatus: 5/5   Subscapularis: 5/5   Biceps: 5/5     Tests   Apprehension: negative  Cross Arm: negative  Drop Arm: negative  Hawkin's test: negative  Impingement: positive  Sulcus: absent    Other   Erythema: absent  Scars: absent  Sensation: normal  Pulse: present         Physical Exam   Constitutional: He is oriented to person, place, and time  He appears well-developed and well-nourished  HENT:   Head: Normocephalic  Cardiovascular: Intact distal pulses  Pulmonary/Chest: Effort normal    Neurological: He is alert and oriented to person, place, and time  Skin: Skin is warm and dry  Psychiatric: He has a normal mood and affect  His behavior is normal          I have personally reviewed pertinent films in PACS  and found no evidence of degenerative diseases, acute fracture or dislocation  Glenohumeral joint appropriately aligned and without superior migration   No evidence of foreign bodies     Large joint arthrocentesis  Date/Time: 3/26/2018 10:09 AM  Consent given by: patient  Timeout: Immediately prior to procedure a time out was called to verify the correct patient, procedure, equipment, support staff and site/side marked as required   Supporting Documentation  Indications: pain   Procedure Details  Location: shoulder - L subacromial bursa  Preparation: Patient was prepped and draped in the usual sterile fashion  Needle size: 22 G  Approach: lateral  Medications administered: 2 mL bupivacaine 0 25 %; 6 mg betamethasone acetate-betamethasone sodium phosphate 6 (3-3) mg/mL    Patient tolerance: patient tolerated the procedure well with no immediate complications  Dressing:  Sterile dressing applied        I Betty Santana MD personally examined the patient and reviewed the history provided  I agree with the note and the assessment and plan by Liliane Mock PA-C  Briefly the patient is a 58 y o  male with a chief complaint of left shoulder pain who presents to the office for evaluation and treatment  Please refer to the HPI documented by the PA in the body of the note for further details  Physical Exam: Blood pressure 142/90, pulse 63, height 5' 9 02" (1 753 m), weight 116 kg (255 lb 3 2 oz)  Left shoulder full range of motion with positive Rueda Neer impingement signs and good strength    Radiology: I have personally reviewed the following images and my interpretation follows      Three views left shoulder show no bony abnormality    Assessment:    Left shoulder subacromial impingement syndrome    Plan:    Patient was provided with a subacromial injection under my direct supervision and he is already in physical therapy as he is recovering from a right total shoulder arthroplasty, he will work with the therapist on left shoulder as well and we will see him back on an as-needed basis if his symptoms persist further imaging could be considered

## 2018-03-26 NOTE — PATIENT INSTRUCTIONS
Rest for next 24 hours  Ice every 20 minutes on and off to left shoulder for next 24 hours  Follow-up in 2 months, if not better

## 2018-03-26 NOTE — LETTER
March 26, 2018     Patient: Lo Leblanc   YOB: 1955   Date of Visit: 3/26/2018       To Whom it May Concern:    Lo Leblanc is under my professional care  He was seen in my office on 3/26/2018  He may return to work on 4/2/2018  If you have any questions or concerns, please don't hesitate to call           Sincerely,          Claudene Caprice, PA-C        CC: No Recipients

## 2018-03-27 ENCOUNTER — APPOINTMENT (OUTPATIENT)
Dept: PHYSICAL THERAPY | Facility: CLINIC | Age: 63
End: 2018-03-27
Payer: COMMERCIAL

## 2018-03-28 ENCOUNTER — OFFICE VISIT (OUTPATIENT)
Dept: PHYSICAL THERAPY | Facility: CLINIC | Age: 63
End: 2018-03-28
Payer: COMMERCIAL

## 2018-03-28 DIAGNOSIS — M25.511 CHRONIC RIGHT SHOULDER PAIN: Primary | ICD-10-CM

## 2018-03-28 DIAGNOSIS — G89.29 CHRONIC RIGHT SHOULDER PAIN: Primary | ICD-10-CM

## 2018-03-28 DIAGNOSIS — Z96.611 STATUS POST REPLACEMENT OF RIGHT SHOULDER JOINT: ICD-10-CM

## 2018-03-28 PROCEDURE — 97140 MANUAL THERAPY 1/> REGIONS: CPT

## 2018-03-28 PROCEDURE — 97110 THERAPEUTIC EXERCISES: CPT

## 2018-03-28 PROCEDURE — 97112 NEUROMUSCULAR REEDUCATION: CPT

## 2018-03-28 NOTE — PROGRESS NOTES
Daily Note     Today's date: 3/28/2018  Patient name: Jason Koo  : 1955  MRN: [id number]  Referring provider: Shaheed Martines MD  Dx:   Encounter Diagnosis     ICD-10-CM    1  Chronic right shoulder pain M25 511     G89 29                   Subjective: Patient reports that he feels his shoulder is improving  Notes that it is getting better and stronger          Objective: See treatment diary below  Precautions: See protocol    Daily Treatment Diary     Manual  2/20 2/22 2/27 3/1 3/6 3/8 3/15 3/20 3/28    PROM- to tolerance,  15 10 10 10 min 15 min 15 min 15 min 15 min 10'    PA GH grade 2-3 mobs 5  min 5 min 5 min 5 min np                                                   Exercise Diary  2/20 2/22 2/27 3/1 3/6 3/8 3/15 3/20 3/28    Pulleys 3 min 3 min 3/3 33 3/3 3/3 3/3 3/3 3/3    Isometrics- 4 way 10x :10 10x :10 10x:10 10x :10 D/c        AAROM supine wand- flex 2x10 2x10 2x10          Wall slides- abd 10x :05 10x :05 10 x :05 10x :05 10x :05   10x :05 :05 x 10                 Finger ladder  10x 10x 10x 10x 10x  10x x10    Wall ER ST   5 x :20 5 x :20 5x :20 5x :20 5x :20 5x :20 5x:20    AROM flex/abd #2    2x10 pain 2x10 2x10 2x10 2x10 2# 2x10    S/L ER #2    2x10 2x10 2x10 2x10 2x10     TB ER RTB    2x10 2x10 2x10 2x10 2x10 2x10    Bicep curl- #10-     2x20 2x10 2x10 2x10 2x10    Tricep TB ext GTB     2x10 2x10 2x10 2x10 2x10    IR strap ST       :05x5 :05 x10 10x :05 :05 x 10    Wand ext      2x10 2x10 2x10 2x10                                                                                    Precautions: See protocol    Daily Treatment Diary     Precautions: See protocol    Daily Treatment Diary     Manual  2/20 2/22 2/27 3/1 3/6 3/8 3/15 3/20     PROM- to tolerance,  15 10 10 10 min 15 min 15 min 15 min 15 min     PA GH grade 2-3 mobs 5  min 5 min 5 min 5 min np                                                   Exercise Diary  2/20 2/22 2/27 3/1 3/6 3/8 3/15 3/20     Pulleys 3 min 3 min 3/3 3/3 3/3 3/3 3/3 3/3     Isometrics- 4 way 10x :10 10x :10 10x:10 10x :10 D/c        AAROM supine wand- flex 2x10 2x10 2x10          Wall slides- abd 10x :05 10x :05 10 x :05 10x :05 10x :05   10x :05                  Finger ladder  10x 10x 10x 10x 10x  10x     Wall ER ST   5 x :20 5 x :20 5x :20 5x :20 5x :20 5x :20     AROM flex/abd #2    2x10 pain 2x10 2x10 2x10 2x10     S/L ER #2    2x10 2x10 2x10 2x10 2x10     TB ER RTB    2x10 2x10 2x10 2x10 2x10     Bicep curl- #10-     2x20 2x10 2x10 2x10     Tricep TB ext GTB     2x10 2x10 2x10 2x10     IR strap ST       :05x5 :05 x10 10x :05     Wand ext      2x10 2x10 2x10                                                                                     Assessment: Tolerated treatment well  Patient would benefit from continued PT      Plan: Progress treatment as tolerated

## 2018-03-29 ENCOUNTER — OFFICE VISIT (OUTPATIENT)
Dept: PHYSICAL THERAPY | Facility: CLINIC | Age: 63
End: 2018-03-29
Payer: COMMERCIAL

## 2018-03-29 DIAGNOSIS — M25.511 CHRONIC RIGHT SHOULDER PAIN: Primary | ICD-10-CM

## 2018-03-29 DIAGNOSIS — Z96.611 STATUS POST REPLACEMENT OF RIGHT SHOULDER JOINT: ICD-10-CM

## 2018-03-29 DIAGNOSIS — G89.29 CHRONIC RIGHT SHOULDER PAIN: Primary | ICD-10-CM

## 2018-03-29 PROCEDURE — G8991 OTHER PT/OT GOAL STATUS: HCPCS | Performed by: PHYSICAL THERAPIST

## 2018-03-29 PROCEDURE — G8990 OTHER PT/OT CURRENT STATUS: HCPCS | Performed by: PHYSICAL THERAPIST

## 2018-04-04 ENCOUNTER — EVALUATION (OUTPATIENT)
Dept: PHYSICAL THERAPY | Facility: CLINIC | Age: 63
End: 2018-04-04
Payer: COMMERCIAL

## 2018-04-04 DIAGNOSIS — Z96.611 STATUS POST TOTAL SHOULDER ARTHROPLASTY, RIGHT: ICD-10-CM

## 2018-04-04 DIAGNOSIS — G89.29 CHRONIC RIGHT SHOULDER PAIN: Primary | ICD-10-CM

## 2018-04-04 DIAGNOSIS — M25.511 CHRONIC RIGHT SHOULDER PAIN: Primary | ICD-10-CM

## 2018-04-04 PROCEDURE — G8990 OTHER PT/OT CURRENT STATUS: HCPCS | Performed by: PHYSICAL THERAPIST

## 2018-04-04 PROCEDURE — 97140 MANUAL THERAPY 1/> REGIONS: CPT | Performed by: PHYSICAL THERAPIST

## 2018-04-04 PROCEDURE — 97112 NEUROMUSCULAR REEDUCATION: CPT | Performed by: PHYSICAL THERAPIST

## 2018-04-04 PROCEDURE — 97110 THERAPEUTIC EXERCISES: CPT | Performed by: PHYSICAL THERAPIST

## 2018-04-04 PROCEDURE — G8991 OTHER PT/OT GOAL STATUS: HCPCS | Performed by: PHYSICAL THERAPIST

## 2018-04-04 NOTE — PROGRESS NOTES
PT Re-Evaluation     Today's date: 2018  Patient name: Larry Fleischer  : 1955  MRN: [id number]  Referring provider: Benji Esparza MD  Dx:   Encounter Diagnosis     ICD-10-CM    1  Chronic right shoulder pain M25 511     G89 29                   Assessment  Impairments: abnormal or restricted ROM and impaired physical strength    Assessment details: Patient has shown good progress of strength and ROM  He is back at work and is able to perform overhead lifting of 20# s pain  He would also like to get back to shooting his gun and will be trialing this during the week  He is appropriate for d/c to HEP at this time and if has further pain he will follow up with us  Thank you again for the kind referral    Understanding of Dx/Px/POC: good   Prognosis: good    Goals  STGs  1  Decrease pain by 50% in 2-4 weeks  - met  2  Improve shoulder PROM to full in 2-4 weeks  met  3  Improve shoulder strength by 1/3 grade in 2-4 weeks  LTGs  1  Decrease pain by 100% in 6-8 weeks  -met  2  Improve to reaching overhead #2-3 in 8 weeks  -met  3  Perform job activities without pain in 6-8 weeks  - met      Plan  Patient would benefit from: skilled PT  Planned therapy interventions: manual therapy, therapeutic exercise, stretching, strengthening and neuromuscular re-education  Frequency: 2x week  Duration in weeks: 8  Treatment plan discussed with: patient        Subjective Evaluation    History of Present Illness  Mechanism of injury: He states he is feeling quite a bit better  He states he only has tightness when sleeping when he wakes up     Pain  At best pain ratin  At worst pain ratin          Objective     Active Range of Motion   Left Shoulder   Flexion: 172 degrees   Abduction: 175 degrees   External rotation 0°: 60 degrees   External rotation BTH: C7     Right Shoulder   Flexion: 155 degrees   Abduction: 145 degrees   External rotation 0°: 43 degrees   External rotation BTH: C6     Additional Active Range of Motion Details  Elbow strength: WNL    Passive Range of Motion     Right Shoulder   Flexion: 155 degrees   Abduction: 145 degrees   External rotation 0°: 55 degrees   External rotation 45°: 65 degrees   External rotation 90°: 45 degrees   Internal rotation 90°: 12 degrees           Precautions: See protocol    Daily Treatment Diary     Manual  2/20 2/22 2/27 3/1 3/6 3/8 3/15 3/20 4/4    PROM- to tolerance,  15 10 10 10 min 15 min 15 min 15 min 15 min 10'    PA GH grade 2-3 mobs 5  min 5 min 5 min 5 min np                                                   Exercise Diary  2/20 2/22 2/27 3/1 3/6 3/8 3/15 3/20 3/28    Pulleys 3 min 3 min 3/3 3/3 3/3 3/3 3/3 3/3 3/3    Isometrics- 4 way 10x :10 10x :10 10x:10 10x :10 D/c        AAROM supine wand- flex 2x10 2x10 2x10          Wall slides- abd 10x :05 10x :05 10 x :05 10x :05 10x :05   10x :05 :05 x 10                 Finger ladder  10x 10x 10x 10x 10x  10x x10    Wall ER ST   5 x :20 5 x :20 5x :20 5x :20 5x :20 5x :20 5x:20    AROM flex/abd #2    2x10 pain 2x10 2x10 2x10 2x10 2# 2x10    S/L ER #2    2x10 2x10 2x10 2x10 2x10     TB ER RTB    2x10 2x10 2x10 2x10 2x10 2x10    Bicep curl- #10-     2x20 2x10 2x10 2x10 2x10    Tricep TB ext GTB     2x10 2x10 2x10 2x10 2x10    IR strap ST       :05x5 :05 x10 10x :05 :05 x 10    Wand ext      2x10 2x10 2x10 2x10

## 2018-04-11 ENCOUNTER — APPOINTMENT (OUTPATIENT)
Dept: PHYSICAL THERAPY | Facility: CLINIC | Age: 63
End: 2018-04-11
Payer: COMMERCIAL

## 2018-04-13 ENCOUNTER — OFFICE VISIT (OUTPATIENT)
Dept: INTERNAL MEDICINE CLINIC | Facility: CLINIC | Age: 63
End: 2018-04-13
Payer: COMMERCIAL

## 2018-04-13 VITALS
HEIGHT: 69 IN | BODY MASS INDEX: 42.21 KG/M2 | DIASTOLIC BLOOD PRESSURE: 80 MMHG | TEMPERATURE: 98.4 F | WEIGHT: 285 LBS | OXYGEN SATURATION: 96 % | SYSTOLIC BLOOD PRESSURE: 116 MMHG | HEART RATE: 57 BPM

## 2018-04-13 DIAGNOSIS — E66.01 MORBID OBESITY (HCC): ICD-10-CM

## 2018-04-13 DIAGNOSIS — R59.0 SUBMANDIBULAR LYMPHADENOPATHY: ICD-10-CM

## 2018-04-13 DIAGNOSIS — R73.01 IMPAIRED FASTING GLUCOSE: Primary | ICD-10-CM

## 2018-04-13 DIAGNOSIS — E78.5 DYSLIPIDEMIA: ICD-10-CM

## 2018-04-13 DIAGNOSIS — Z12.5 SCREENING PSA (PROSTATE SPECIFIC ANTIGEN): ICD-10-CM

## 2018-04-13 DIAGNOSIS — R22.2 MASS OF SKIN OF BACK: ICD-10-CM

## 2018-04-13 DIAGNOSIS — Z23 NEED FOR DIPHTHERIA-TETANUS-PERTUSSIS (TDAP) VACCINE: ICD-10-CM

## 2018-04-13 PROCEDURE — 99214 OFFICE O/P EST MOD 30 MIN: CPT | Performed by: INTERNAL MEDICINE

## 2018-04-13 PROCEDURE — 90471 IMMUNIZATION ADMIN: CPT

## 2018-04-13 PROCEDURE — 90715 TDAP VACCINE 7 YRS/> IM: CPT

## 2018-04-13 NOTE — PROGRESS NOTES
Assessment/Plan:    Submandibular lymphadenopathy  Appears benign on CT  Feels smaller today    Morbid obesity (HCC)  Cont diet and exercise  Discussed intermittent fasting  Also discussed Belviq and Contrave for possible future use         Problem List Items Addressed This Visit     RESOLVED: Mass of skin of back    Dyslipidemia    Relevant Orders    CBC    Comprehensive metabolic panel    Lipid panel    Impaired fasting glucose - Primary    Relevant Orders    CBC    Comprehensive metabolic panel    HEMOGLOBIN A1C W/ EAG ESTIMATION    Morbid obesity ([address] )     Cont diet and exercise  Discussed intermittent fasting  Also discussed Belviq and Contrave for possible future use         Submandibular lymphadenopathy     Appears benign on CT  Feels smaller today         Screening PSA (prostate specific antigen)    Relevant Orders    PSA, Total Screen      Other Visit Diagnoses     Need for diphtheria-tetanus-pertussis (Tdap) vaccine                Subjective:      Patient ID: Marilou Burdick is a 58 y o  male  HPI  Patient is here for a follow up  He is doing very well  Completed PT for his right shoulder  His left shoulder bothers him but this is mild  He lost > 30lbs on Saxenda which he used for 3months  He is no longer on it and continues to do well  He says he is not interested in starting it I the future because he does not like administering himself injections  He did not have side effects from the medication     He did not feel well on phentermine which we tried first   Dental work recently- needed clearance from me that he did not require abx prophylaxis  Also had a large sebaceous cyst removed by Dr Satish Lauren  Expecting first grandchild in August and needs Adacel        The following portions of the patient's history were reviewed and updated as appropriate: allergies, current medications, past family history, past medical history, past social history, past surgical history and problem list     Review of Systems Constitutional: Negative for fatigue, fever and unexpected weight change  HENT: Negative for sinus pain, sinus pressure and sore throat  Respiratory: Negative for cough, shortness of breath and wheezing  Cardiovascular: Negative for chest pain, palpitations and leg swelling  Gastrointestinal: Negative for abdominal pain, constipation, diarrhea, nausea and vomiting  Musculoskeletal: Negative for arthralgias  Objective:      /80   Pulse 57   Temp 98 4 °F (36 9 °C) (Oral)   Ht 5' 9" (1 753 m)   Wt 129 kg (285 lb)   SpO2 96%   BMI 42 09 kg/m²          Physical Exam   Constitutional: He is oriented to person, place, and time  He appears well-developed and well-nourished  HENT:   Head: Normocephalic and atraumatic  Eyes: Conjunctivae are normal    Neck: Neck supple  Cardiovascular: Normal rate, regular rhythm and normal heart sounds  No murmur heard  Pulmonary/Chest: Effort normal and breath sounds normal  No respiratory distress  He has no wheezes  He has no rales  Abdominal: Soft  Bowel sounds are normal  He exhibits no distension and no mass  There is no tenderness  There is no rebound and no guarding  Musculoskeletal: Normal range of motion  Lymphadenopathy:     He has cervical adenopathy (left posterior auricular)  Neurological: He is alert and oriented to person, place, and time  Skin: Skin is warm and dry  Psychiatric: He has a normal mood and affect   His behavior is normal  Judgment and thought content normal

## 2018-04-13 NOTE — ASSESSMENT & PLAN NOTE
Cont diet and exercise  Discussed intermittent fasting  Also discussed Belviq and Contrave for possible future use

## 2018-05-04 NOTE — PROGRESS NOTES
Anupam Meehan  has made great functional progress with physical therapy  he was educated and updated in their home exercise program  Ashleyhomero Jcmonique will be discharged from formal therapy but will follow up as needed

## 2018-07-17 ENCOUNTER — TELEPHONE (OUTPATIENT)
Dept: INTERNAL MEDICINE CLINIC | Facility: CLINIC | Age: 63
End: 2018-07-17

## 2018-07-17 DIAGNOSIS — E66.01 CLASS 3 SEVERE OBESITY DUE TO EXCESS CALORIES WITHOUT SERIOUS COMORBIDITY WITH BODY MASS INDEX (BMI) OF 40.0 TO 44.9 IN ADULT (HCC): Primary | ICD-10-CM

## 2018-08-13 DIAGNOSIS — E66.01 CLASS 3 SEVERE OBESITY DUE TO EXCESS CALORIES WITHOUT SERIOUS COMORBIDITY WITH BODY MASS INDEX (BMI) OF 40.0 TO 44.9 IN ADULT (HCC): ICD-10-CM

## 2018-09-05 ENCOUNTER — OFFICE VISIT (OUTPATIENT)
Dept: INTERNAL MEDICINE CLINIC | Facility: CLINIC | Age: 63
End: 2018-09-05
Payer: COMMERCIAL

## 2018-09-05 VITALS
OXYGEN SATURATION: 97 % | HEIGHT: 69 IN | HEART RATE: 79 BPM | DIASTOLIC BLOOD PRESSURE: 80 MMHG | SYSTOLIC BLOOD PRESSURE: 128 MMHG | TEMPERATURE: 98.1 F | WEIGHT: 290 LBS | BODY MASS INDEX: 42.95 KG/M2

## 2018-09-05 DIAGNOSIS — L23.7 POISON IVY: Primary | ICD-10-CM

## 2018-09-05 DIAGNOSIS — L03.115 CELLULITIS OF RIGHT LOWER EXTREMITY: ICD-10-CM

## 2018-09-05 PROCEDURE — 99213 OFFICE O/P EST LOW 20 MIN: CPT | Performed by: NURSE PRACTITIONER

## 2018-09-05 PROCEDURE — 3008F BODY MASS INDEX DOCD: CPT | Performed by: NURSE PRACTITIONER

## 2018-09-05 RX ORDER — METHYLPREDNISOLONE 4 MG/1
TABLET ORAL
Qty: 21 TABLET | Refills: 0 | Status: SHIPPED | OUTPATIENT
Start: 2018-09-05 | End: 2018-10-22 | Stop reason: ALTCHOICE

## 2018-09-05 RX ORDER — LEVOCETIRIZINE DIHYDROCHLORIDE 5 MG/1
5 TABLET, FILM COATED ORAL EVERY EVENING
COMMUNITY
End: 2019-05-29 | Stop reason: SINTOL

## 2018-09-05 RX ORDER — SULFAMETHOXAZOLE AND TRIMETHOPRIM 800; 160 MG/1; MG/1
1 TABLET ORAL EVERY 12 HOURS SCHEDULED
Qty: 10 TABLET | Refills: 0 | Status: SHIPPED | OUTPATIENT
Start: 2018-09-05 | End: 2018-09-10

## 2018-09-05 NOTE — PROGRESS NOTES
Assessment/Plan:     Diagnoses and all orders for this visit:    Poison ivy  -     Methylprednisolone 4 MG TBPK; Use as directed on package    Cellulitis of right lower extremity  -     sulfamethoxazole-trimethoprim (BACTRIM DS) 800-160 mg per tablet; Take 1 tablet by mouth every 12 (twelve) hours for 5 days    Other orders  -     levocetirizine (XYZAL) 5 MG tablet; Take 5 mg by mouth every evening          Subjective:      Patient ID: Charlee Salomon is a 58 y o  male  Here for a rash  Started last Wednesday after being in the garden a few days prior   Started on his bilateral lower legs  Spreading to bilateral arms  Very itchy   Itching keeping him up at night   He has tried steroid cream and calamine lotion without relief    Also complaining of an area on lateral right calf that had a stinger or thorn caught in it, it has become increasing more red   Denies any fevers          The following portions of the patient's history were reviewed and updated as appropriate: allergies, current medications, past family history, past medical history, past social history, past surgical history and problem list     Review of Systems   Constitutional: Negative  Respiratory: Negative  Cardiovascular: Negative  Musculoskeletal: Negative for myalgias  Skin: Positive for rash  Neurological: Negative  Objective:      /80   Pulse 79   Temp 98 1 °F (36 7 °C)   Ht 5' 9" (1 753 m)   Wt 132 kg (290 lb)   SpO2 97%   BMI 42 83 kg/m²          Physical Exam   Constitutional: He is oriented to person, place, and time  He appears well-developed and well-nourished  Cardiovascular: Normal rate, regular rhythm and normal heart sounds  Pulmonary/Chest: Effort normal and breath sounds normal    Neurological: He is alert and oriented to person, place, and time  Skin: Rash noted     Erythematous vesicular papules on bilateral ankles/lower legs and bilateral arms  Vesicles are draining clear fluid  Areas of excoriation    Right lateral calf there is a moderate size area of redness  Area is warm to touch  About 7-8 cm    Psychiatric: He has a normal mood and affect  His behavior is normal    Vitals reviewed

## 2018-10-03 ENCOUNTER — APPOINTMENT (OUTPATIENT)
Dept: LAB | Facility: CLINIC | Age: 63
End: 2018-10-03
Payer: COMMERCIAL

## 2018-10-03 DIAGNOSIS — E78.5 DYSLIPIDEMIA: ICD-10-CM

## 2018-10-03 DIAGNOSIS — R73.01 IMPAIRED FASTING GLUCOSE: ICD-10-CM

## 2018-10-03 DIAGNOSIS — Z12.5 SCREENING PSA (PROSTATE SPECIFIC ANTIGEN): ICD-10-CM

## 2018-10-03 LAB
ALBUMIN SERPL BCP-MCNC: 3.6 G/DL (ref 3.5–5)
ALP SERPL-CCNC: 52 U/L (ref 46–116)
ALT SERPL W P-5'-P-CCNC: 44 U/L (ref 12–78)
ANION GAP SERPL CALCULATED.3IONS-SCNC: 7 MMOL/L (ref 4–13)
AST SERPL W P-5'-P-CCNC: 18 U/L (ref 5–45)
BILIRUB SERPL-MCNC: 0.8 MG/DL (ref 0.2–1)
BUN SERPL-MCNC: 18 MG/DL (ref 5–25)
CALCIUM SERPL-MCNC: 8.6 MG/DL (ref 8.3–10.1)
CHLORIDE SERPL-SCNC: 104 MMOL/L (ref 100–108)
CHOLEST SERPL-MCNC: 197 MG/DL (ref 50–200)
CO2 SERPL-SCNC: 27 MMOL/L (ref 21–32)
CREAT SERPL-MCNC: 1.23 MG/DL (ref 0.6–1.3)
ERYTHROCYTE [DISTWIDTH] IN BLOOD BY AUTOMATED COUNT: 12.8 % (ref 11.6–15.1)
EST. AVERAGE GLUCOSE BLD GHB EST-MCNC: 105 MG/DL
GFR SERPL CREATININE-BSD FRML MDRD: 62 ML/MIN/1.73SQ M
GLUCOSE P FAST SERPL-MCNC: 106 MG/DL (ref 65–99)
HBA1C MFR BLD: 5.3 % (ref 4.2–6.3)
HCT VFR BLD AUTO: 40.9 % (ref 36.5–49.3)
HDLC SERPL-MCNC: 39 MG/DL (ref 40–60)
HGB BLD-MCNC: 14.6 G/DL (ref 12–17)
LDLC SERPL CALC-MCNC: 127 MG/DL (ref 0–100)
MCH RBC QN AUTO: 31.6 PG (ref 26.8–34.3)
MCHC RBC AUTO-ENTMCNC: 35.7 G/DL (ref 31.4–37.4)
MCV RBC AUTO: 89 FL (ref 82–98)
NONHDLC SERPL-MCNC: 158 MG/DL
PLATELET # BLD AUTO: 134 THOUSANDS/UL (ref 149–390)
PMV BLD AUTO: 11.1 FL (ref 8.9–12.7)
POTASSIUM SERPL-SCNC: 3.8 MMOL/L (ref 3.5–5.3)
PROT SERPL-MCNC: 7.1 G/DL (ref 6.4–8.2)
PSA SERPL-MCNC: 0.3 NG/ML (ref 0–4)
RBC # BLD AUTO: 4.62 MILLION/UL (ref 3.88–5.62)
SODIUM SERPL-SCNC: 138 MMOL/L (ref 136–145)
TRIGL SERPL-MCNC: 157 MG/DL
WBC # BLD AUTO: 5.55 THOUSAND/UL (ref 4.31–10.16)

## 2018-10-03 PROCEDURE — G0103 PSA SCREENING: HCPCS

## 2018-10-03 PROCEDURE — 36415 COLL VENOUS BLD VENIPUNCTURE: CPT

## 2018-10-03 PROCEDURE — 80061 LIPID PANEL: CPT

## 2018-10-03 PROCEDURE — 83036 HEMOGLOBIN GLYCOSYLATED A1C: CPT

## 2018-10-03 PROCEDURE — 80053 COMPREHEN METABOLIC PANEL: CPT

## 2018-10-03 PROCEDURE — 85027 COMPLETE CBC AUTOMATED: CPT

## 2018-10-22 ENCOUNTER — OFFICE VISIT (OUTPATIENT)
Dept: INTERNAL MEDICINE CLINIC | Facility: CLINIC | Age: 63
End: 2018-10-22
Payer: COMMERCIAL

## 2018-10-22 VITALS
OXYGEN SATURATION: 95 % | SYSTOLIC BLOOD PRESSURE: 124 MMHG | HEART RATE: 69 BPM | WEIGHT: 296.6 LBS | HEIGHT: 69 IN | BODY MASS INDEX: 43.93 KG/M2 | DIASTOLIC BLOOD PRESSURE: 84 MMHG | RESPIRATION RATE: 16 BRPM

## 2018-10-22 DIAGNOSIS — Z23 NEED FOR INFLUENZA VACCINATION: ICD-10-CM

## 2018-10-22 DIAGNOSIS — G47.33 OBSTRUCTIVE SLEEP APNEA: ICD-10-CM

## 2018-10-22 DIAGNOSIS — R73.01 IMPAIRED FASTING GLUCOSE: ICD-10-CM

## 2018-10-22 DIAGNOSIS — E66.01 MORBID OBESITY (HCC): Primary | ICD-10-CM

## 2018-10-22 DIAGNOSIS — E78.5 DYSLIPIDEMIA: ICD-10-CM

## 2018-10-22 PROBLEM — Z96.611 STATUS POST TOTAL SHOULDER ARTHROPLASTY, RIGHT: Status: RESOLVED | Noted: 2018-01-10 | Resolved: 2018-10-22

## 2018-10-22 PROCEDURE — 1036F TOBACCO NON-USER: CPT | Performed by: INTERNAL MEDICINE

## 2018-10-22 PROCEDURE — 99214 OFFICE O/P EST MOD 30 MIN: CPT | Performed by: INTERNAL MEDICINE

## 2018-10-22 PROCEDURE — 90471 IMMUNIZATION ADMIN: CPT

## 2018-10-22 PROCEDURE — 90682 RIV4 VACC RECOMBINANT DNA IM: CPT

## 2018-10-22 PROCEDURE — 3008F BODY MASS INDEX DOCD: CPT | Performed by: INTERNAL MEDICINE

## 2018-10-22 NOTE — PROGRESS NOTES
Assessment/Plan:  Start Contrave  Discussed Shingrix     Problem List Items Addressed This Visit        Endocrine    Impaired fasting glucose       Respiratory    Obstructive sleep apnea       Other    Dyslipidemia    Morbid obesity (HCC) - Primary    Relevant Medications    Naltrexone-Bupropion HCl ER 8-90 MG TB12      Other Visit Diagnoses     Need for influenza vaccination        Relevant Orders    influenza vaccine, [id number], quadrivalent, recombinant, PF, 0 5 mL, for patients 18 yr+ (FLUBLOK)            Subjective:      Patient ID: Aubrey Joe is a 61 y o  male  HPI   Here for a follow up  Struggles with weight  He did well on Saxenda and lost 30lbs early this year  He was off it for a few months and resumed and did not lose weight the second time  He would like to try Contrave which we discussed before  [place] frequently, diet is poor, not exercising  JAZMYN comlpiant with mask  Recent labs reviewed, look fine    The following portions of the patient's history were reviewed and updated as appropriate: allergies, current medications, past family history, past medical history, past social history, past surgical history and problem list     Review of Systems   Constitutional: Negative for fatigue, fever and unexpected weight change  HENT: Negative for ear pain, hearing loss, sinus pain, sinus pressure and sore throat  Respiratory: Negative for cough, shortness of breath and wheezing  Cardiovascular: Negative for chest pain, palpitations and leg swelling  Gastrointestinal: Negative for abdominal pain, constipation, diarrhea, nausea and vomiting  Musculoskeletal: Positive for arthralgias (L shoulder , gets injections)  Negative for myalgias  Neurological: Negative for dizziness and headaches           Objective:      /84 (BP Location: Left arm, Patient Position: Sitting, Cuff Size: Standard)   Pulse 69   Resp 16   Ht 5' 9" (1 753 m)   Wt 135 kg (296 lb 9 6 oz)   SpO2 95%   BMI 43 80 kg/m²          Physical Exam   Constitutional: He is oriented to person, place, and time  He appears well-developed and well-nourished  HENT:   Head: Normocephalic and atraumatic  Right Ear: External ear normal    Left Ear: External ear normal    Mouth/Throat: Oropharynx is clear and moist    Eyes: Conjunctivae are normal    Neck: Neck supple  Cardiovascular: Normal rate, regular rhythm and normal heart sounds  No murmur heard  Pulmonary/Chest: Effort normal and breath sounds normal  No respiratory distress  He has no wheezes  He has no rales  Abdominal: Soft  Bowel sounds are normal  He exhibits no distension and no mass  There is no tenderness  There is no rebound and no guarding  Musculoskeletal: Normal range of motion  Lymphadenopathy:     He has cervical adenopathy (L posterior auricular, about 2 cm, not tender, +movable)  Neurological: He is alert and oriented to person, place, and time  Skin: Skin is warm and dry  Psychiatric: He has a normal mood and affect   His behavior is normal  Judgment and thought content normal

## 2019-01-28 ENCOUNTER — OFFICE VISIT (OUTPATIENT)
Dept: SLEEP CENTER | Facility: CLINIC | Age: 64
End: 2019-01-28
Payer: COMMERCIAL

## 2019-01-28 ENCOUNTER — OFFICE VISIT (OUTPATIENT)
Dept: INTERNAL MEDICINE CLINIC | Facility: CLINIC | Age: 64
End: 2019-01-28
Payer: COMMERCIAL

## 2019-01-28 VITALS
TEMPERATURE: 98.1 F | SYSTOLIC BLOOD PRESSURE: 141 MMHG | BODY MASS INDEX: 43.99 KG/M2 | HEART RATE: 60 BPM | DIASTOLIC BLOOD PRESSURE: 87 MMHG | OXYGEN SATURATION: 95 % | HEIGHT: 69 IN | WEIGHT: 297 LBS

## 2019-01-28 VITALS
HEIGHT: 69 IN | WEIGHT: 296 LBS | DIASTOLIC BLOOD PRESSURE: 86 MMHG | BODY MASS INDEX: 43.84 KG/M2 | SYSTOLIC BLOOD PRESSURE: 140 MMHG | HEART RATE: 60 BPM

## 2019-01-28 DIAGNOSIS — E78.5 DYSLIPIDEMIA: ICD-10-CM

## 2019-01-28 DIAGNOSIS — E66.01 MORBID OBESITY (HCC): Primary | ICD-10-CM

## 2019-01-28 DIAGNOSIS — M54.50 CHRONIC BILATERAL LOW BACK PAIN WITHOUT SCIATICA: ICD-10-CM

## 2019-01-28 DIAGNOSIS — G47.33 OBSTRUCTIVE SLEEP APNEA: ICD-10-CM

## 2019-01-28 DIAGNOSIS — R59.0 SUBMANDIBULAR LYMPHADENOPATHY: ICD-10-CM

## 2019-01-28 DIAGNOSIS — G47.33 OSA (OBSTRUCTIVE SLEEP APNEA): Primary | ICD-10-CM

## 2019-01-28 DIAGNOSIS — E66.01 MORBID OBESITY WITH BMI OF 40.0-44.9, ADULT (HCC): ICD-10-CM

## 2019-01-28 DIAGNOSIS — G47.10 HYPERSOMNIA: ICD-10-CM

## 2019-01-28 DIAGNOSIS — IMO0002 SLEEP-RELATED HYPOVENTILATION: ICD-10-CM

## 2019-01-28 DIAGNOSIS — G89.29 CHRONIC BILATERAL LOW BACK PAIN WITHOUT SCIATICA: ICD-10-CM

## 2019-01-28 DIAGNOSIS — N52.9 ERECTILE DYSFUNCTION, UNSPECIFIED ERECTILE DYSFUNCTION TYPE: ICD-10-CM

## 2019-01-28 DIAGNOSIS — R73.01 IMPAIRED FASTING GLUCOSE: ICD-10-CM

## 2019-01-28 DIAGNOSIS — R29.898 JAW CLICKING: ICD-10-CM

## 2019-01-28 DIAGNOSIS — M25.511 PAIN IN JOINT OF RIGHT SHOULDER: ICD-10-CM

## 2019-01-28 DIAGNOSIS — J30.2 SEASONAL ALLERGIES: ICD-10-CM

## 2019-01-28 PROCEDURE — 99214 OFFICE O/P EST MOD 30 MIN: CPT | Performed by: INTERNAL MEDICINE

## 2019-01-28 PROCEDURE — 1036F TOBACCO NON-USER: CPT | Performed by: INTERNAL MEDICINE

## 2019-01-28 RX ORDER — NAPROXEN 500 MG/1
500 TABLET ORAL 2 TIMES DAILY PRN
Qty: 30 TABLET | Refills: 1 | Status: SHIPPED | OUTPATIENT
Start: 2019-01-28 | End: 2019-09-19 | Stop reason: SDUPTHER

## 2019-01-28 RX ORDER — TADALAFIL 20 MG/1
20 TABLET ORAL AS NEEDED
Qty: 30 TABLET | Refills: 1 | Status: SHIPPED | OUTPATIENT
Start: 2019-01-28 | End: 2020-03-20 | Stop reason: SDUPTHER

## 2019-01-28 NOTE — PROGRESS NOTES
Assessment/Plan:    Impaired fasting glucose  A1C in October was 5 3  Understands the importance of weight loss with a low carb diet    Obstructive sleep apnea  Compliant with CPAP use    Submandibular lymphadenopathy  Left  Smaller actually  We have continued to monitor this    Dyslipidemia  Check lipid panel before next visit    Morbid obesity ([address] )  BMI Counseling: Body mass index is 43 86 kg/m²  Discussed the patient's BMI with him  The BMI is above average  BMI counseling and education was provided to the patient  Nutrition recommendations include moderation in carbohydrate intake  Exercise recommendations include exercising 3-5 times per week  Problem List Items Addressed This Visit        Endocrine    Impaired fasting glucose     A1C in October was 5 3  Understands the importance of weight loss with a low carb diet         Relevant Orders    Hemoglobin A1C       Respiratory    Obstructive sleep apnea     Compliant with CPAP use            Immune and Lymphatic    Submandibular lymphadenopathy     Left  Smaller actually  We have continued to monitor this            Other    Dyslipidemia     Check lipid panel before next visit         Relevant Orders    CBC and differential    Comprehensive metabolic panel    Hemoglobin A1C    Lipid panel    Morbid obesity (HCC) - Primary     BMI Counseling: Body mass index is 43 86 kg/m²  Discussed the patient's BMI with him  The BMI is above average  BMI counseling and education was provided to the patient  Nutrition recommendations include moderation in carbohydrate intake  Exercise recommendations include exercising 3-5 times per week                 Other Visit Diagnoses     Erectile dysfunction, unspecified erectile dysfunction type        Relevant Medications    tadalafil (CIALIS) 20 MG tablet    Pain in joint of right shoulder        Relevant Medications    naproxen (NAPROSYN) 500 mg tablet    Chronic bilateral low back pain without sciatica        Relevant Medications    naproxen (NAPROSYN) 500 mg tablet    Jaw clicking        Relevant Orders    Ambulatory referral to Oral Maxillofacial Surgery            Subjective:      Patient ID: Sanjeev Sales is a 61 y o  male  HPI  Here for a follow up  He is doing well overall  Chronic struggles with weight  He has been on [address] which helped but when he stopped and restarted, he was unable to lose weight  He recently tried Contrave without improvement  Poor high carb diet, no exercise  Son is getting  in June which is a motivating factor for him to lose weight again  Right jaw clicking when he eats since his teens-mother and sister have it too  His wife has said that she can hear him clicking and he is getting self conscious when he is out eating with his colleagues    The following portions of the patient's history were reviewed and updated as appropriate: allergies, past family history, past medical history, past social history, past surgical history and problem list     Review of Systems   Constitutional: Negative for fatigue, fever and unexpected weight change  HENT: Positive for rhinorrhea  Negative for ear pain, hearing loss, sinus pain, sinus pressure, sore throat and trouble swallowing  Respiratory: Negative for cough, shortness of breath and wheezing  Cardiovascular: Negative for chest pain, palpitations and leg swelling  Gastrointestinal: Negative for abdominal pain, blood in stool, constipation, diarrhea, nausea and vomiting  Musculoskeletal: Negative for arthralgias and myalgias  Neurological: Negative for dizziness and headaches  Hematological: Positive for adenopathy (left submandiluar swollen gland is smaller)  Objective:      /87   Pulse 60   Temp 98 1 °F (36 7 °C)   Ht 5' 9" (1 753 m)   Wt 135 kg (297 lb)   SpO2 95%   BMI 43 86 kg/m²          Physical Exam   Constitutional: He is oriented to person, place, and time  He appears well-developed and well-nourished  HENT:   Head: Normocephalic and atraumatic  Right Ear: External ear normal    Left Ear: External ear normal    Mouth/Throat: Oropharynx is clear and moist    Eyes: Conjunctivae are normal    Neck: Neck supple  Cardiovascular: Normal rate, regular rhythm and normal heart sounds  No murmur heard  Pulmonary/Chest: Effort normal and breath sounds normal  No respiratory distress  He has no wheezes  He has no rales  Abdominal: Soft  Bowel sounds are normal  He exhibits no distension and no mass  There is no tenderness  There is no rebound and no guarding  Musculoskeletal: Normal range of motion  Lymphadenopathy:     He has cervical adenopathy (left submandibular, soft movable mass (feels smaller than previous))  Neurological: He is alert and oriented to person, place, and time  Skin: Skin is warm and dry  Psychiatric: He has a normal mood and affect   His behavior is normal  Judgment and thought content normal

## 2019-01-28 NOTE — ASSESSMENT & PLAN NOTE
BMI Counseling: Body mass index is 43 86 kg/m²  Discussed the patient's BMI with him  The BMI is above average  BMI counseling and education was provided to the patient  Nutrition recommendations include moderation in carbohydrate intake  Exercise recommendations include exercising 3-5 times per week

## 2019-01-28 NOTE — PROGRESS NOTES
Follow-Up Note - Sleep Center   Dee Padilla  61 y o  male  JBA:[birth year]  [facility]:[id number]    CC: I saw this patient for follow-up in clinic today for his Sleep Disordered Breathing, Coexisting Sleep and Medical Problems  PFSH, Problem List, Medications & Allergies were reviewed in EMR  Interval changes: none reported  He  has a past medical history of CPAP (continuous positive airway pressure) dependence; Elevated serum creatinine; Sleep apnea; and Tinnitus, left  He has a current medication list which includes the following prescription(s): aspirin, levocetirizine, naproxen, and tadalafil  ROS: Reviewed (see attached)  Significant for seasonal allergies for which he uses p r n  Medication  He has had some intentional weight loss  DATA REVIEWED: compliance data download shows  using PAP > 4 hours/night 63% of the time  SHUN (estimated) 1 6/hour at 90th percentile pressure of 19/14cm H2O     SUBJECTIVE: Marine Tian reports some difficulty tolerating PAP;  · He is experiencing some adverse effects: dry mouth  · He is benefitting from use and reports: sleeping better   Sleep Routine: He reports getting 7 hours sleep  ; he has no difficulty initiating or maintaining sleep   He awakens spontaneously feeling refreshed  He significantly improved  excessive drowsiness   He rated himself at Total score: 8 /24 on the [place] sleepiness scale  Habits: reports that he has never smoked  He has never used smokeless tobacco ,  reports that he drinks alcohol ,  reports that he does not use drugs  , Caffeine use: limited , Exercise routine: irregular   OBJECTIVE: /86   Pulse 60   Ht 5' 9" (1 753 m)   Wt 134 kg (296 lb)   BMI 43 71 kg/m²    Patient is well groomed; well appearing  Skin/Extrem: warm & dry; col & hydration normal; no edema  Psych: cooperativeand in no distress  Mental state appears normal   CNS: Alert, orientated, clear & coherent speech    H&N: EOMI; NC/AT:no facial pressure marks, no rashes  Neck Circumference: 49 5 cm  ENMT Mucus membranes normal Nasal airway:patent  Oral airway: crowded  Resp:effort is normal CVS: RRR ABD:truncal obesity MSK:Gait normal     ASSESSMENT: Primary Sleep/Secondary(to Medical or Psych conditions) & comorbidities   1  JAZMYN (obstructive sleep apnea)  Sleep F/U  - established patient    Bipap DME   2  Sleep-related hypoventilation     3  Hypersomnia     4  Morbid obesity with BMI of 40 0-44 9, adult ([facility] [address] )     5  Seasonal allergies         PLAN:  1  I reviewed results of the Sleep studies with the patient:  During his last therapeutic study, he did reasonably well with BiPAP at 18/14 cm H2 O  There were central events which have likely abated by now  2  Treatment with  PAP is medically necessary and Marine Tian is agreable to continue use  3  Care of equipment, methods to improve comfort using PAP and importance of compliance with therapy were discussed  4  Pressure setting:  Decrease to 18/14 cm H2O     5  Rx provided to replace supplies and Care coordinated with DME provider  6  Strategies for weight reduction were discussed  7  Follow-up is advised in 1 year or sooner if needed to monitor progress, compliance and to adjust therapy  Thank you for allowing me to participate in the care of this patient      Sincerely,    Authenticated electronically by Marc Maravilla MD on 43/57/00   Board Certified Specialist

## 2019-05-22 ENCOUNTER — APPOINTMENT (OUTPATIENT)
Dept: LAB | Facility: CLINIC | Age: 64
End: 2019-05-22
Payer: COMMERCIAL

## 2019-05-22 ENCOUNTER — TRANSCRIBE ORDERS (OUTPATIENT)
Dept: LAB | Facility: CLINIC | Age: 64
End: 2019-05-22

## 2019-05-22 DIAGNOSIS — R73.01 IMPAIRED FASTING GLUCOSE: ICD-10-CM

## 2019-05-22 DIAGNOSIS — E78.5 DYSLIPIDEMIA: ICD-10-CM

## 2019-05-22 LAB
ALBUMIN SERPL BCP-MCNC: 3.7 G/DL (ref 3.5–5)
ALP SERPL-CCNC: 54 U/L (ref 46–116)
ALT SERPL W P-5'-P-CCNC: 23 U/L (ref 12–78)
ANION GAP SERPL CALCULATED.3IONS-SCNC: 9 MMOL/L (ref 4–13)
AST SERPL W P-5'-P-CCNC: 12 U/L (ref 5–45)
BASOPHILS # BLD AUTO: 0.05 THOUSANDS/ΜL (ref 0–0.1)
BASOPHILS NFR BLD AUTO: 1 % (ref 0–1)
BILIRUB SERPL-MCNC: 0.7 MG/DL (ref 0.2–1)
BUN SERPL-MCNC: 20 MG/DL (ref 5–25)
CALCIUM SERPL-MCNC: 9 MG/DL (ref 8.3–10.1)
CHLORIDE SERPL-SCNC: 105 MMOL/L (ref 100–108)
CHOLEST SERPL-MCNC: 185 MG/DL (ref 50–200)
CO2 SERPL-SCNC: 26 MMOL/L (ref 21–32)
CREAT SERPL-MCNC: 1.34 MG/DL (ref 0.6–1.3)
EOSINOPHIL # BLD AUTO: 0.27 THOUSAND/ΜL (ref 0–0.61)
EOSINOPHIL NFR BLD AUTO: 4 % (ref 0–6)
ERYTHROCYTE [DISTWIDTH] IN BLOOD BY AUTOMATED COUNT: 12.8 % (ref 11.6–15.1)
EST. AVERAGE GLUCOSE BLD GHB EST-MCNC: 97 MG/DL
GFR SERPL CREATININE-BSD FRML MDRD: 56 ML/MIN/1.73SQ M
GLUCOSE P FAST SERPL-MCNC: 117 MG/DL (ref 65–99)
HBA1C MFR BLD: 5 % (ref 4.2–6.3)
HCT VFR BLD AUTO: 42.8 % (ref 36.5–49.3)
HDLC SERPL-MCNC: 39 MG/DL (ref 40–60)
HGB BLD-MCNC: 15.3 G/DL (ref 12–17)
IMM GRANULOCYTES # BLD AUTO: 0.03 THOUSAND/UL (ref 0–0.2)
IMM GRANULOCYTES NFR BLD AUTO: 1 % (ref 0–2)
LDLC SERPL CALC-MCNC: 123 MG/DL (ref 0–100)
LYMPHOCYTES # BLD AUTO: 2.41 THOUSANDS/ΜL (ref 0.6–4.47)
LYMPHOCYTES NFR BLD AUTO: 38 % (ref 14–44)
MCH RBC QN AUTO: 31 PG (ref 26.8–34.3)
MCHC RBC AUTO-ENTMCNC: 35.7 G/DL (ref 31.4–37.4)
MCV RBC AUTO: 87 FL (ref 82–98)
MONOCYTES # BLD AUTO: 0.41 THOUSAND/ΜL (ref 0.17–1.22)
MONOCYTES NFR BLD AUTO: 6 % (ref 4–12)
NEUTROPHILS # BLD AUTO: 3.19 THOUSANDS/ΜL (ref 1.85–7.62)
NEUTS SEG NFR BLD AUTO: 50 % (ref 43–75)
NONHDLC SERPL-MCNC: 146 MG/DL
NRBC BLD AUTO-RTO: 0 /100 WBCS
PLATELET # BLD AUTO: 154 THOUSANDS/UL (ref 149–390)
PMV BLD AUTO: 11.5 FL (ref 8.9–12.7)
POTASSIUM SERPL-SCNC: 4.1 MMOL/L (ref 3.5–5.3)
PROT SERPL-MCNC: 7.3 G/DL (ref 6.4–8.2)
RBC # BLD AUTO: 4.94 MILLION/UL (ref 3.88–5.62)
SODIUM SERPL-SCNC: 140 MMOL/L (ref 136–145)
TRIGL SERPL-MCNC: 114 MG/DL
WBC # BLD AUTO: 6.36 THOUSAND/UL (ref 4.31–10.16)

## 2019-05-22 PROCEDURE — 80061 LIPID PANEL: CPT

## 2019-05-22 PROCEDURE — 80053 COMPREHEN METABOLIC PANEL: CPT

## 2019-05-22 PROCEDURE — 83036 HEMOGLOBIN GLYCOSYLATED A1C: CPT

## 2019-05-22 PROCEDURE — 85025 COMPLETE CBC W/AUTO DIFF WBC: CPT

## 2019-05-22 PROCEDURE — 36415 COLL VENOUS BLD VENIPUNCTURE: CPT

## 2019-05-29 ENCOUNTER — OFFICE VISIT (OUTPATIENT)
Dept: INTERNAL MEDICINE CLINIC | Facility: CLINIC | Age: 64
End: 2019-05-29
Payer: COMMERCIAL

## 2019-05-29 VITALS
HEART RATE: 81 BPM | DIASTOLIC BLOOD PRESSURE: 78 MMHG | WEIGHT: 300 LBS | OXYGEN SATURATION: 96 % | BODY MASS INDEX: 44.43 KG/M2 | SYSTOLIC BLOOD PRESSURE: 138 MMHG | HEIGHT: 69 IN

## 2019-05-29 DIAGNOSIS — R73.01 IMPAIRED FASTING GLUCOSE: ICD-10-CM

## 2019-05-29 DIAGNOSIS — J30.2 SEASONAL ALLERGIES: ICD-10-CM

## 2019-05-29 DIAGNOSIS — R59.0 SUBMANDIBULAR LYMPHADENOPATHY: ICD-10-CM

## 2019-05-29 DIAGNOSIS — E66.01 MORBID OBESITY WITH BMI OF 40.0-44.9, ADULT (HCC): Primary | ICD-10-CM

## 2019-05-29 DIAGNOSIS — E78.5 DYSLIPIDEMIA: ICD-10-CM

## 2019-05-29 DIAGNOSIS — J06.9 UPPER RESPIRATORY TRACT INFECTION, UNSPECIFIED TYPE: ICD-10-CM

## 2019-05-29 PROCEDURE — 1036F TOBACCO NON-USER: CPT | Performed by: INTERNAL MEDICINE

## 2019-05-29 PROCEDURE — 99214 OFFICE O/P EST MOD 30 MIN: CPT | Performed by: INTERNAL MEDICINE

## 2019-05-29 PROCEDURE — 3008F BODY MASS INDEX DOCD: CPT | Performed by: INTERNAL MEDICINE

## 2019-05-29 RX ORDER — AZITHROMYCIN 250 MG/1
TABLET, FILM COATED ORAL
Qty: 6 TABLET | Refills: 0 | Status: SHIPPED | OUTPATIENT
Start: 2019-05-29 | End: 2019-06-02

## 2019-05-29 RX ORDER — AZELASTINE 1 MG/ML
1 SPRAY, METERED NASAL 2 TIMES DAILY PRN
Qty: 30 ML | Refills: 1 | Status: SHIPPED | OUTPATIENT
Start: 2019-05-29 | End: 2019-06-20 | Stop reason: SDUPTHER

## 2019-06-20 DIAGNOSIS — J30.2 SEASONAL ALLERGIES: ICD-10-CM

## 2019-06-20 RX ORDER — AZELASTINE HYDROCHLORIDE 137 UG/1
SPRAY, METERED NASAL
Qty: 1 BOTTLE | Refills: 1 | Status: SHIPPED | OUTPATIENT
Start: 2019-06-20 | End: 2020-03-20 | Stop reason: SDUPTHER

## 2019-09-19 DIAGNOSIS — G89.29 CHRONIC BILATERAL LOW BACK PAIN WITHOUT SCIATICA: ICD-10-CM

## 2019-09-19 DIAGNOSIS — M54.50 CHRONIC BILATERAL LOW BACK PAIN WITHOUT SCIATICA: ICD-10-CM

## 2019-09-19 DIAGNOSIS — E66.01 MORBID OBESITY WITH BMI OF 40.0-44.9, ADULT (HCC): ICD-10-CM

## 2019-09-19 DIAGNOSIS — M25.511 PAIN IN JOINT OF RIGHT SHOULDER: ICD-10-CM

## 2019-09-19 RX ORDER — NAPROXEN 500 MG/1
500 TABLET ORAL 2 TIMES DAILY PRN
Qty: 30 TABLET | Refills: 3 | Status: SHIPPED | OUTPATIENT
Start: 2019-09-19 | End: 2021-01-25 | Stop reason: SDUPTHER

## 2019-11-03 DIAGNOSIS — J30.2 SEASONAL ALLERGIES: ICD-10-CM

## 2019-11-03 RX ORDER — AZELASTINE HYDROCHLORIDE 137 UG/1
SPRAY, METERED NASAL
Qty: 30 ML | Refills: 1 | Status: SHIPPED | OUTPATIENT
Start: 2019-11-03 | End: 2020-03-20 | Stop reason: SDUPTHER

## 2019-12-30 ENCOUNTER — TELEPHONE (OUTPATIENT)
Dept: SLEEP CENTER | Facility: CLINIC | Age: 64
End: 2019-12-30

## 2019-12-30 DIAGNOSIS — G47.33 OSA (OBSTRUCTIVE SLEEP APNEA): Primary | ICD-10-CM

## 2019-12-30 NOTE — TELEPHONE ENCOUNTER
Patient called, requesting script for travel CPAP machine  States he will be paying out of pocket and requesting script be emailed to him at [email or web address]  Patient would like to be notified when script sent

## 2020-01-13 NOTE — TELEPHONE ENCOUNTER
Patient left message that he was waiting for a script for travel CPAP machine  Called patient & left message that it was emailed to him last week, but if he didn't get email he can call back & let us know if he would like it to be emailed again or sent through mail

## 2020-01-27 ENCOUNTER — OFFICE VISIT (OUTPATIENT)
Dept: SLEEP CENTER | Facility: CLINIC | Age: 65
End: 2020-01-27
Payer: COMMERCIAL

## 2020-01-27 VITALS
BODY MASS INDEX: 44.43 KG/M2 | DIASTOLIC BLOOD PRESSURE: 80 MMHG | SYSTOLIC BLOOD PRESSURE: 130 MMHG | WEIGHT: 300 LBS | HEIGHT: 69 IN

## 2020-01-27 DIAGNOSIS — E66.01 MORBID OBESITY WITH BMI OF 40.0-44.9, ADULT (HCC): ICD-10-CM

## 2020-01-27 DIAGNOSIS — F51.12 INSUFFICIENT SLEEP SYNDROME: ICD-10-CM

## 2020-01-27 DIAGNOSIS — IMO0002 SLEEP-RELATED HYPOVENTILATION: ICD-10-CM

## 2020-01-27 DIAGNOSIS — J30.2 SEASONAL ALLERGIES: ICD-10-CM

## 2020-01-27 DIAGNOSIS — G47.33 OSA (OBSTRUCTIVE SLEEP APNEA): Primary | ICD-10-CM

## 2020-01-27 PROCEDURE — 99214 OFFICE O/P EST MOD 30 MIN: CPT | Performed by: INTERNAL MEDICINE

## 2020-01-27 NOTE — PROGRESS NOTES
Follow-Up Note - Sleep Center   Asael Current  59 y o  male  CCT:[birth year]  RPY:[id number]    CC: I saw this patient for follow-up in clinic today for his Sleep Disordered Breathing, Coexisting Sleep and Medical Problems  PFSH, Problem List, Medications & Allergies were reviewed in EMR  Interval changes: none reported  He  has a past medical history of CPAP (continuous positive airway pressure) dependence, Elevated serum creatinine, Sleep apnea, and Tinnitus, left  He has a current medication list which includes the following prescription(s): azelastine hcl, azelastine hcl, insulin pen needle, liraglutide, naproxen, and tadalafil  ROS: A 10 point review of systems undertaken (as attached)  Significant for seasonal allergies that are controlled  DATA REVIEWED:  using PAP > 4 hours/night 63% of the time  Estimated SHUN 1 5/hour at pressure of 18/14 cm H2O  [place] and even though he is using the machine, it does not always register use  SUBJECTIVE: Regarding use of PAP, Jerry Kelly reports:   · He is experiencing some adverse effects: mask leaks   · He is   benefiting from use: sleeping better   Sleep Routine: He reports getting 6 hrs sleep  ; he has no difficulty initiating or maintaining sleep   He awakens spontaneously and usually feels refreshed  He denied excessive drowsiness   He rated himself at Total score: 7 /24 on the [place] sleepiness scale  Habits: reports that he has never smoked  He has never used smokeless tobacco ,  reports that he drinks alcohol ,  reports that he does not use drugs  , Caffeine use: limited , Exercise routine: irregular   OBJECTIVE: /80   Ht 5' 9" (1 753 m)   Wt 136 kg (300 lb)   BMI 44 30 kg/m²    Constitutional: Patient is well groomed; well appearing  Skin/Extrem: warm & dry; col & hydration normal; no edema  Psych: cooperativeand in no distress   Mental State appears normal   CNS: Alert, orientated, clear & coherent speech  H&N: EOMI; NC/AT:no facial pressure marks, no rashes  ENMT Mucus membranes normal Nasal airway:patent  Oral airway: crowded  Resp:effort is normal CVS: RRR ABD:truncal obesity MSK:Gait normal     ASSESSMENT: Primary Sleep/Secondary(to Medical or Psych conditions) & comorbidities   1  JAZMYN (obstructive sleep apnea)  PAP DME Resupply/Reorder    Cpap DME   2  Sleep-related hypoventilation  PAP DME Resupply/Reorder    Cpap DME   3  Insufficient sleep syndrome     4  Morbid obesity with BMI of 40 0-44 9, adult ([facility] [address] )     5  Seasonal allergies         PLAN:  1  Treatment with  PAP is medically necessary and Therese Shearer is agreable to continue use  2  Care of equipment, methods to improve comfort using PAP and importance of compliance with therapy were discussed  3  Pressure setting: continue 18/14 cmH2O  He is also interested in a travel machine that is not available for BiPAP  I provided a prescription for CPAP at 18 cm H2O with pressure relief at level 3    4  Rx provided to replace supplies and Care coordinated with DME provider  5  Strategies for weight reduction were discussed  6  I also advised allowing sufficient opportunity for sleep  7  Follow-up is advised in 1 year or sooner if needed to monitor progress, compliance and to adjust therapy  Thank you for allowing me to participate in the care of this patient      Sincerely,    Authenticated electronically by Scottie Bhatia MD on 46/73/45   Board Certified Specialist

## 2020-01-27 NOTE — PATIENT INSTRUCTIONS

## 2020-01-27 NOTE — PROGRESS NOTES
Review of Systems      Genitourinary none   Cardiology none   Gastrointestinal none   Neurology none   Constitutional fatigue   Integumentary none   Psychiatry none   Musculoskeletal leg cramps   Pulmonary none   ENT none   Endocrine none   Hematological none

## 2020-01-28 ENCOUNTER — TELEPHONE (OUTPATIENT)
Dept: SLEEP CENTER | Facility: CLINIC | Age: 65
End: 2020-01-28

## 2020-01-28 NOTE — TELEPHONE ENCOUNTER
DME resupply order faxed to [employer]    Left message for patient to call office  Will need to know what to do with script for travel machine

## 2020-01-29 ENCOUNTER — TELEPHONE (OUTPATIENT)
Dept: SLEEP CENTER | Facility: CLINIC | Age: 65
End: 2020-01-29

## 2020-01-29 NOTE — TELEPHONE ENCOUNTER
Spoke with the patient he is going to check sources to purchase his travel machine and call back to let us know where to send RX

## 2020-02-04 NOTE — TELEPHONE ENCOUNTER
Recalled the patient he is on hold right now about travel machine  Requested we send the RX    RX mailed to the patient

## 2020-03-16 ENCOUNTER — TELEPHONE (OUTPATIENT)
Dept: INTERNAL MEDICINE CLINIC | Facility: CLINIC | Age: 65
End: 2020-03-16

## 2020-03-16 DIAGNOSIS — Z12.5 SCREENING PSA (PROSTATE SPECIFIC ANTIGEN): ICD-10-CM

## 2020-03-16 DIAGNOSIS — E78.5 DYSLIPIDEMIA: ICD-10-CM

## 2020-03-16 DIAGNOSIS — R73.01 IMPAIRED FASTING GLUCOSE: Primary | ICD-10-CM

## 2020-03-16 NOTE — TELEPHONE ENCOUNTER
Patient is asking for an order for routine blood work done for his upcoming appt  Pt uses [facility]  Thank you

## 2020-03-17 ENCOUNTER — TRANSCRIBE ORDERS (OUTPATIENT)
Dept: LAB | Facility: CLINIC | Age: 65
End: 2020-03-17

## 2020-03-17 ENCOUNTER — APPOINTMENT (OUTPATIENT)
Dept: LAB | Facility: CLINIC | Age: 65
End: 2020-03-17
Payer: COMMERCIAL

## 2020-03-17 LAB
ALBUMIN SERPL BCP-MCNC: 3.6 G/DL (ref 3.5–5)
ALP SERPL-CCNC: 53 U/L (ref 46–116)
ALT SERPL W P-5'-P-CCNC: 31 U/L (ref 12–78)
ANION GAP SERPL CALCULATED.3IONS-SCNC: 9 MMOL/L (ref 4–13)
AST SERPL W P-5'-P-CCNC: 12 U/L (ref 5–45)
BASOPHILS # BLD AUTO: 0.05 THOUSANDS/ΜL (ref 0–0.1)
BASOPHILS NFR BLD AUTO: 1 % (ref 0–1)
BILIRUB SERPL-MCNC: 0.48 MG/DL (ref 0.2–1)
BUN SERPL-MCNC: 23 MG/DL (ref 5–25)
CALCIUM SERPL-MCNC: 9 MG/DL (ref 8.3–10.1)
CHLORIDE SERPL-SCNC: 105 MMOL/L (ref 100–108)
CHOLEST SERPL-MCNC: 179 MG/DL (ref 50–200)
CO2 SERPL-SCNC: 27 MMOL/L (ref 21–32)
CREAT SERPL-MCNC: 1.2 MG/DL (ref 0.6–1.3)
EOSINOPHIL # BLD AUTO: 0.24 THOUSAND/ΜL (ref 0–0.61)
EOSINOPHIL NFR BLD AUTO: 4 % (ref 0–6)
ERYTHROCYTE [DISTWIDTH] IN BLOOD BY AUTOMATED COUNT: 12.4 % (ref 11.6–15.1)
EST. AVERAGE GLUCOSE BLD GHB EST-MCNC: 103 MG/DL
GFR SERPL CREATININE-BSD FRML MDRD: 64 ML/MIN/1.73SQ M
GLUCOSE P FAST SERPL-MCNC: 116 MG/DL (ref 65–99)
HBA1C MFR BLD: 5.2 %
HCT VFR BLD AUTO: 42.4 % (ref 36.5–49.3)
HDLC SERPL-MCNC: 33 MG/DL
HGB BLD-MCNC: 15.1 G/DL (ref 12–17)
IMM GRANULOCYTES # BLD AUTO: 0.02 THOUSAND/UL (ref 0–0.2)
IMM GRANULOCYTES NFR BLD AUTO: 0 % (ref 0–2)
LDLC SERPL CALC-MCNC: 106 MG/DL (ref 0–100)
LYMPHOCYTES # BLD AUTO: 2.65 THOUSANDS/ΜL (ref 0.6–4.47)
LYMPHOCYTES NFR BLD AUTO: 41 % (ref 14–44)
MCH RBC QN AUTO: 32.1 PG (ref 26.8–34.3)
MCHC RBC AUTO-ENTMCNC: 35.6 G/DL (ref 31.4–37.4)
MCV RBC AUTO: 90 FL (ref 82–98)
MONOCYTES # BLD AUTO: 0.4 THOUSAND/ΜL (ref 0.17–1.22)
MONOCYTES NFR BLD AUTO: 6 % (ref 4–12)
NEUTROPHILS # BLD AUTO: 3.15 THOUSANDS/ΜL (ref 1.85–7.62)
NEUTS SEG NFR BLD AUTO: 48 % (ref 43–75)
NONHDLC SERPL-MCNC: 146 MG/DL
NRBC BLD AUTO-RTO: 0 /100 WBCS
PLATELET # BLD AUTO: 141 THOUSANDS/UL (ref 149–390)
PMV BLD AUTO: 11.8 FL (ref 8.9–12.7)
POTASSIUM SERPL-SCNC: 3.9 MMOL/L (ref 3.5–5.3)
PROT SERPL-MCNC: 7.3 G/DL (ref 6.4–8.2)
PSA SERPL-MCNC: 0.4 NG/ML (ref 0–4)
RBC # BLD AUTO: 4.71 MILLION/UL (ref 3.88–5.62)
SODIUM SERPL-SCNC: 141 MMOL/L (ref 136–145)
TRIGL SERPL-MCNC: 202 MG/DL
WBC # BLD AUTO: 6.51 THOUSAND/UL (ref 4.31–10.16)

## 2020-03-17 PROCEDURE — 80061 LIPID PANEL: CPT | Performed by: INTERNAL MEDICINE

## 2020-03-17 PROCEDURE — 80053 COMPREHEN METABOLIC PANEL: CPT | Performed by: INTERNAL MEDICINE

## 2020-03-17 PROCEDURE — 36415 COLL VENOUS BLD VENIPUNCTURE: CPT | Performed by: INTERNAL MEDICINE

## 2020-03-17 PROCEDURE — 83036 HEMOGLOBIN GLYCOSYLATED A1C: CPT | Performed by: INTERNAL MEDICINE

## 2020-03-17 PROCEDURE — G0103 PSA SCREENING: HCPCS | Performed by: INTERNAL MEDICINE

## 2020-03-17 PROCEDURE — 85025 COMPLETE CBC W/AUTO DIFF WBC: CPT | Performed by: INTERNAL MEDICINE

## 2020-03-20 ENCOUNTER — TELEMEDICINE (OUTPATIENT)
Dept: INTERNAL MEDICINE CLINIC | Facility: CLINIC | Age: 65
End: 2020-03-20
Payer: COMMERCIAL

## 2020-03-20 DIAGNOSIS — E78.5 DYSLIPIDEMIA: Primary | ICD-10-CM

## 2020-03-20 DIAGNOSIS — N52.9 ERECTILE DYSFUNCTION, UNSPECIFIED ERECTILE DYSFUNCTION TYPE: ICD-10-CM

## 2020-03-20 DIAGNOSIS — E66.01 MORBID OBESITY (HCC): ICD-10-CM

## 2020-03-20 DIAGNOSIS — J30.2 SEASONAL ALLERGIES: ICD-10-CM

## 2020-03-20 DIAGNOSIS — R73.01 IMPAIRED FASTING GLUCOSE: ICD-10-CM

## 2020-03-20 PROCEDURE — G2012 BRIEF CHECK IN BY MD/QHP: HCPCS | Performed by: INTERNAL MEDICINE

## 2020-03-20 RX ORDER — ATORVASTATIN CALCIUM 10 MG/1
10 TABLET, FILM COATED ORAL DAILY
Qty: 90 TABLET | Refills: 3 | Status: SHIPPED | OUTPATIENT
Start: 2020-03-20 | End: 2021-01-25 | Stop reason: SDUPTHER

## 2020-03-20 RX ORDER — TADALAFIL 20 MG/1
20 TABLET ORAL AS NEEDED
Qty: 30 TABLET | Refills: 3 | Status: SHIPPED | OUTPATIENT
Start: 2020-03-20 | End: 2021-01-25 | Stop reason: SDUPTHER

## 2020-03-20 RX ORDER — AZELASTINE HYDROCHLORIDE 137 UG/1
1 SPRAY, METERED NASAL 2 TIMES DAILY
Qty: 30 ML | Refills: 3 | Status: SHIPPED | OUTPATIENT
Start: 2020-03-20 | End: 2021-08-09 | Stop reason: SDUPTHER

## 2020-03-20 NOTE — PROGRESS NOTES
Virtual Brief Visit    Reason for visit is follow up      Encounter provider Shady Mae MD    Provider located at [address] [address]      Recent Visits  Date Type Provider Dept   03/16/20 Telephone [address] recent visits within past 7 days and meeting all other requirements     Future Appointments  No visits were found meeting these conditions  Showing future appointments within next 150 days and meeting all other requirements        Patient agrees to participate in a virtual check in via telephone or video visit instead of presenting to the office to address urgent/immediate medical needs  Patient is aware this is a billable service  After connecting through , the patient was identified by name and date of birth  Nguyen Lackey was informed that this was a telemedicine visit and that the visit is being conducted through  which may not be secure and therefore might not be HIPAA-compliant  He acknowledged consent and understanding of privacy and security of the virtual check-in visit  I informed the patient that I have reviewed his record in Epic and presented the opportunity for him to ask any questions regarding the visit today  The patient initiated communication and agreed to participate  Subjective  Nguyen Lackey is a 59 y o  male with high cholesterol and morbid obesity  He is feeling well overall  He has no complaints except for seasonal allergies with the runny nose and sinus pressure  He denies fever, chills, cough, chest pain, SOB, palpitations, abdominal pain, nausea, vomiting, diarrhea, difficulty urinating, dizziness  He checks his BP and it was 132/85 HR 70s T98 3 wt 302  He tried [place] in the past and was successful but the second time he used it, experienced GI upset so stopped  He has has tried phentermine without success    Recent labs- elevated FBS, normal A1C  Lipids slightly elevated  Discussed 10 y risk for CVD at 14 3 % and statin therapy is recommended  He agreed to this  Blood work in [facility] with any issues  Meds refilled      Past Medical History:   Diagnosis Date    CPAP (continuous positive airway pressure) dependence     Elevated serum creatinine     last assessed 01/16/2018    Sleep apnea     Tinnitus, left     last assessed 08/11/2014       Past Surgical History:   Procedure Laterality Date    COLONOSCOPY      DE COLONOSCOPY FLX DX W/COLLJ SPEC WHEN PFRMD N/A 6/27/2017    Procedure: COLONOSCOPY;  Surgeon: Norma Álvarez MD;  [facility]: [facility]; Service: Colorectal    DE RECONSTR TOTAL SHOULDER IMPLANT Right 1/9/2018    Procedure: TOTAL SHOULDER ARTHROPLASTY;  Surgeon: Ilan Madison MD;  [facility];  Service: Orthopedics       Current Outpatient Medications   Medication Sig Dispense Refill    atorvastatin (LIPITOR) 10 mg tablet Take 1 tablet (10 mg total) by mouth daily 90 tablet 3    Azelastine HCl 137 MCG/SPRAY SOLN 1 spray into each nostril 2 (two) times a day 30 mL 3    naproxen (NAPROSYN) 500 mg tablet Take 1 tablet (500 mg total) by mouth 2 (two) times a day as needed for moderate pain 30 tablet 3    tadalafil (Cialis) 20 MG tablet Take 1 tablet (20 mg total) by mouth as needed for erectile dysfunction Take 1 hour prior to activity 30 tablet 3     No current facility-administered medications for this visit  No Known Allergies    Assessment    Jose David Staley  agrees to starting statin therapy  Blood work in [address] [place] [address]  Schedule f/u in 4months  Jose David Staley was seen today for virtual regular visit and virtual brief visit  Diagnoses and all orders for this visit:    Dyslipidemia  -     atorvastatin (LIPITOR) 10 mg tablet; Take 1 tablet (10 mg total) by mouth daily  -     Comprehensive metabolic panel; Future  -     Lipid panel;  Future    Seasonal allergies  -     Azelastine HCl 137 MCG/SPRAY SOLN; 1 spray into each nostril 2 (two) times a day    Erectile dysfunction, unspecified erectile dysfunction type  -     tadalafil (Cialis) 20 MG tablet; Take 1 tablet (20 mg total) by mouth as needed for erectile dysfunction Take 1 hour prior to activity    Impaired fasting glucose    Morbid obesity (HCC)        Disposition:    F/u in 4months  (17503)  I spent 15 minutes with the patient during this virtual check-in visit

## 2020-03-20 NOTE — PROGRESS NOTES
Virtual Regular Visit    Reason for visit is follow up    Encounter provider Kash Hilliadr MD    Provider located at [address] [address]      Recent Visits  Date Type Provider Dept   03/16/20 Telephone [address] recent visits within past 7 days and meeting all other requirements     Future Appointments  No visits were found meeting these conditions  Showing future appointments within next 150 days and meeting all other requirements        After connecting through telephone, the patient was identified by name and date of birth  Shen Rajput was informed that this is a telemedicine visit and that the visit is being conducted through telephone which may not be secure and therefore, might not be HIPAA-compliant  My office door was closed  No one else was in the room  He acknowledged consent and understanding of privacy and security of the  platform  The patient has agreed to participate and understands they can discontinue the visit at any time  Subjective  Shen Rajput is a 59 y o  male with high cholesterol and morbid obesity  He is feeling well overall  He has no complaints except for seasonal allergies with the runny nose and sinus pressure  He denies fever, chills, cough, chest pain, SOB, palpitations, abdominal pain, nausea, vomiting, diarrhea, difficulty urinating, dizziness  He checks his BP and it was 132/85 HR 70s T98 3 wt 302  He tried [address] in the past and was successful but the second time he used it, experienced GI upset so stopped  He has has tried phentermine without success    Recent labs- elevated FBS, normal A1C  Lipids slightly elevated  Discussed 10 y risk for CVD at 14 3 % and statin therapy is recommended  He agreed to this  Blood work in [facility] with any issues  Meds refilled    Past Medical History:   Diagnosis Date    CPAP (continuous positive airway pressure) dependence     Elevated serum creatinine     last assessed 01/16/2018    Sleep apnea     Tinnitus, left     last assessed 08/11/2014       Past Surgical History:   Procedure Laterality Date    COLONOSCOPY      MI COLONOSCOPY FLX DX W/COLLJ SPEC WHEN PFRMD N/A 6/27/2017    Procedure: COLONOSCOPY;  Surgeon: Kirby Quarles MD;  [facility]; Service: Colorectal    MI RECONSTR TOTAL SHOULDER IMPLANT Right 1/9/2018    Procedure: TOTAL SHOULDER ARTHROPLASTY;  Surgeon: Damaris Alcaraz MD;  [facility];  Service: Orthopedics       Current Outpatient Medications   Medication Sig Dispense Refill    atorvastatin (LIPITOR) 10 mg tablet Take 1 tablet (10 mg total) by mouth daily 90 tablet 3    Azelastine HCl 137 MCG/SPRAY SOLN 1 spray into each nostril 2 (two) times a day 30 mL 3    naproxen (NAPROSYN) 500 mg tablet Take 1 tablet (500 mg total) by mouth 2 (two) times a day as needed for moderate pain 30 tablet 3    tadalafil (Cialis) 20 MG tablet Take 1 tablet (20 mg total) by mouth as needed for erectile dysfunction Take 1 hour prior to activity 30 tablet 3     No current facility-administered medications for this visit  No Known Allergies        I spent 15 minutes with the patient during this visit      [id number]

## 2020-07-07 ENCOUNTER — APPOINTMENT (OUTPATIENT)
Dept: LAB | Facility: CLINIC | Age: 65
End: 2020-07-07
Payer: COMMERCIAL

## 2020-07-07 ENCOUNTER — TRANSCRIBE ORDERS (OUTPATIENT)
Dept: LAB | Facility: CLINIC | Age: 65
End: 2020-07-07

## 2020-07-07 DIAGNOSIS — E78.5 DYSLIPIDEMIA: ICD-10-CM

## 2020-07-07 LAB
ALBUMIN SERPL BCP-MCNC: 3.7 G/DL (ref 3.5–5)
ALP SERPL-CCNC: 60 U/L (ref 46–116)
ALT SERPL W P-5'-P-CCNC: 43 U/L (ref 12–78)
ANION GAP SERPL CALCULATED.3IONS-SCNC: 6 MMOL/L (ref 4–13)
AST SERPL W P-5'-P-CCNC: 16 U/L (ref 5–45)
BILIRUB SERPL-MCNC: 0.99 MG/DL (ref 0.2–1)
BUN SERPL-MCNC: 16 MG/DL (ref 5–25)
CALCIUM SERPL-MCNC: 8.9 MG/DL (ref 8.3–10.1)
CHLORIDE SERPL-SCNC: 103 MMOL/L (ref 100–108)
CHOLEST SERPL-MCNC: 128 MG/DL (ref 50–200)
CO2 SERPL-SCNC: 29 MMOL/L (ref 21–32)
CREAT SERPL-MCNC: 1.3 MG/DL (ref 0.6–1.3)
GFR SERPL CREATININE-BSD FRML MDRD: 58 ML/MIN/1.73SQ M
GLUCOSE P FAST SERPL-MCNC: 109 MG/DL (ref 65–99)
HDLC SERPL-MCNC: 33 MG/DL
LDLC SERPL CALC-MCNC: 72 MG/DL (ref 0–100)
NONHDLC SERPL-MCNC: 95 MG/DL
POTASSIUM SERPL-SCNC: 4.3 MMOL/L (ref 3.5–5.3)
PROT SERPL-MCNC: 7.1 G/DL (ref 6.4–8.2)
SODIUM SERPL-SCNC: 138 MMOL/L (ref 136–145)
TRIGL SERPL-MCNC: 117 MG/DL

## 2020-07-07 PROCEDURE — 80053 COMPREHEN METABOLIC PANEL: CPT

## 2020-07-07 PROCEDURE — 80061 LIPID PANEL: CPT

## 2020-07-07 PROCEDURE — 36415 COLL VENOUS BLD VENIPUNCTURE: CPT

## 2020-07-22 RX ORDER — ASPIRIN 325 MG
TABLET, DELAYED RELEASE (ENTERIC COATED) ORAL
COMMUNITY
End: 2020-07-24 | Stop reason: ALTCHOICE

## 2020-07-24 ENCOUNTER — OFFICE VISIT (OUTPATIENT)
Dept: INTERNAL MEDICINE CLINIC | Facility: CLINIC | Age: 65
End: 2020-07-24
Payer: COMMERCIAL

## 2020-07-24 VITALS
DIASTOLIC BLOOD PRESSURE: 74 MMHG | SYSTOLIC BLOOD PRESSURE: 112 MMHG | HEIGHT: 69 IN | TEMPERATURE: 97.8 F | WEIGHT: 273 LBS | BODY MASS INDEX: 40.43 KG/M2 | RESPIRATION RATE: 14 BRPM | HEART RATE: 54 BPM

## 2020-07-24 VITALS
OXYGEN SATURATION: 97 % | HEART RATE: 57 BPM | HEIGHT: 69 IN | SYSTOLIC BLOOD PRESSURE: 112 MMHG | BODY MASS INDEX: 40.43 KG/M2 | TEMPERATURE: 97.8 F | WEIGHT: 273 LBS | DIASTOLIC BLOOD PRESSURE: 74 MMHG

## 2020-07-24 DIAGNOSIS — R73.01 IMPAIRED FASTING GLUCOSE: ICD-10-CM

## 2020-07-24 DIAGNOSIS — E66.01 MORBID OBESITY WITH BMI OF 40.0-44.9, ADULT (HCC): ICD-10-CM

## 2020-07-24 DIAGNOSIS — R59.0 POSTERIOR AURICULAR LYMPHADENOPATHY: ICD-10-CM

## 2020-07-24 DIAGNOSIS — E78.5 DYSLIPIDEMIA: Primary | ICD-10-CM

## 2020-07-24 DIAGNOSIS — H61.21 IMPACTED CERUMEN OF RIGHT EAR: Primary | ICD-10-CM

## 2020-07-24 DIAGNOSIS — J30.2 SEASONAL ALLERGIES: ICD-10-CM

## 2020-07-24 PROBLEM — Z12.5 SCREENING PSA (PROSTATE SPECIFIC ANTIGEN): Status: RESOLVED | Noted: 2018-04-13 | Resolved: 2020-07-24

## 2020-07-24 PROCEDURE — 99214 OFFICE O/P EST MOD 30 MIN: CPT | Performed by: INTERNAL MEDICINE

## 2020-07-24 PROCEDURE — 3008F BODY MASS INDEX DOCD: CPT | Performed by: INTERNAL MEDICINE

## 2020-07-24 PROCEDURE — 1036F TOBACCO NON-USER: CPT | Performed by: INTERNAL MEDICINE

## 2020-07-24 PROCEDURE — 69209 REMOVE IMPACTED EAR WAX UNI: CPT | Performed by: NURSE PRACTITIONER

## 2020-07-24 PROCEDURE — ND001 PR NO DOCUMENTATION: Performed by: NURSE PRACTITIONER

## 2020-07-24 PROCEDURE — 1036F TOBACCO NON-USER: CPT | Performed by: NURSE PRACTITIONER

## 2020-07-24 NOTE — PROGRESS NOTES
Assessment/Plan:    Dyslipidemia  Continue atorvastatin    Posterior auricular lymphadenopathy  Left sided, unchanged for years  CT was not suspicious but at this point, I prefer that he see ENT about it and get their recommendation    BMI Counseling: Body mass index is 40 32 kg/m²  The BMI is above normal  Nutrition recommendations include decreasing overall calorie intake  Exercise recommendations include exercising 3-5 times per week  Problem List Items Addressed This Visit        Endocrine    Impaired fasting glucose    Relevant Orders    Hemoglobin A1C       Immune and Lymphatic    Posterior auricular lymphadenopathy     Left sided, unchanged for years  CT was not suspicious but at this point, I prefer that he see ENT about it and get their recommendation         Relevant Orders    Ambulatory Referral to Otolaryngology       Other    Morbid obesity with BMI of 40 0-44 9, adult ([facility] [address] )    Seasonal allergies    Dyslipidemia - Primary     Continue atorvastatin         Relevant Orders    Lipid Panel with Direct LDL reflex    Comprehensive metabolic panel    CBC and differential            Subjective:      Patient ID: Deborah Bruno is a 59 y o  male  HPI  He is here for a follow up  He has lost 30lbs weight in the past 2 months froma low 1500cal diet and exercise  Recent labs reviewed- LDL down to 72 since starting atorvastatin 10mg in March  Fatigue initially so started taking it at night which worked  BP readings at home 120/80s  FBS still elevated with normal A1C in March    The following portions of the patient's history were reviewed and updated as appropriate: allergies, current medications, past family history, past medical history, past social history, past surgical history and problem list     Review of Systems   Constitutional: Negative for chills, fatigue, fever and unexpected weight change  HENT: Positive for rhinorrhea           Seasonal allergies on Azelastine   Respiratory: Negative for cough, shortness of breath and wheezing  Cardiovascular: Negative for chest pain, palpitations and leg swelling  Gastrointestinal: Negative for abdominal pain, constipation, diarrhea, nausea and vomiting  Endocrine: Negative for polydipsia and polyuria  Genitourinary: Negative for difficulty urinating  Musculoskeletal: Positive for arthralgias  Negative for myalgias  Neurological: Negative for dizziness and headaches  Objective:      /74 (BP Location: Right arm)   Pulse 57   Temp 97 8 °F (36 6 °C)   Ht 5' 9" (1 753 m)   Wt 124 kg (273 lb)   SpO2 97%   BMI 40 32 kg/m²          Physical Exam   Constitutional: He is oriented to person, place, and time  He appears well-developed and well-nourished  HENT:   Head: Normocephalic and atraumatic  Right Ear: External ear normal    Left Ear: External ear normal    Impacted cerumen AD   Eyes: Conjunctivae are normal    Neck: Neck supple  Cardiovascular: Normal rate, regular rhythm and normal heart sounds  No murmur heard  Pulmonary/Chest: Effort normal and breath sounds normal  No stridor  No respiratory distress  He has no wheezes  He has no rales  Abdominal: Soft  He exhibits no distension and no mass  There is no tenderness  There is no rebound and no guarding  Musculoskeletal: He exhibits no edema  Lymphadenopathy:     He has cervical adenopathy (left post auricular, non tender)  Neurological: He is alert and oriented to person, place, and time  Skin: Skin is warm and dry  Psychiatric: He has a normal mood and affect  His behavior is normal  Judgment and thought content normal    Vitals reviewed

## 2020-07-24 NOTE — ASSESSMENT & PLAN NOTE
Left sided, unchanged for years  CT was not suspicious but at this point, I prefer that he see ENT about it and get their recommendation

## 2020-07-27 PROCEDURE — 69210 REMOVE IMPACTED EAR WAX UNI: CPT | Performed by: NURSE PRACTITIONER

## 2020-07-27 NOTE — PROGRESS NOTES
Ear cerumen removal  Date/Time: 7/27/2020 7:58 AM  Performed by: DOUG Wilkerson  Authorized by: DOUG Wilkerson     Patient location:  Clinic  Consent:     Consent obtained:  Verbal    Consent given by:  Patient    Risks discussed:  Dizziness and bleeding  Universal protocol:     Patient identity confirmed:  Verbally with patient  Procedure details:     Location:  R ear    Procedure type: irrigation only      Approach:  External  Post-procedure details:     Hearing quality:  Improved    Patient tolerance of procedure:   Tolerated well, no immediate complications

## 2021-01-11 ENCOUNTER — APPOINTMENT (OUTPATIENT)
Dept: LAB | Facility: CLINIC | Age: 66
End: 2021-01-11
Payer: COMMERCIAL

## 2021-01-11 ENCOUNTER — TRANSCRIBE ORDERS (OUTPATIENT)
Dept: LAB | Facility: CLINIC | Age: 66
End: 2021-01-11

## 2021-01-11 DIAGNOSIS — E78.5 DYSLIPIDEMIA: ICD-10-CM

## 2021-01-11 DIAGNOSIS — R73.01 IMPAIRED FASTING GLUCOSE: ICD-10-CM

## 2021-01-11 LAB
ALBUMIN SERPL BCP-MCNC: 3.9 G/DL (ref 3.5–5)
ALP SERPL-CCNC: 60 U/L (ref 46–116)
ALT SERPL W P-5'-P-CCNC: 37 U/L (ref 12–78)
ANION GAP SERPL CALCULATED.3IONS-SCNC: 5 MMOL/L (ref 4–13)
AST SERPL W P-5'-P-CCNC: 45 U/L (ref 5–45)
BASOPHILS # BLD AUTO: 0.05 THOUSANDS/ΜL (ref 0–0.1)
BASOPHILS NFR BLD AUTO: 1 % (ref 0–1)
BILIRUB SERPL-MCNC: 0.94 MG/DL (ref 0.2–1)
BUN SERPL-MCNC: 25 MG/DL (ref 5–25)
CALCIUM SERPL-MCNC: 9.1 MG/DL (ref 8.3–10.1)
CHLORIDE SERPL-SCNC: 106 MMOL/L (ref 100–108)
CHOLEST SERPL-MCNC: 123 MG/DL (ref 50–200)
CO2 SERPL-SCNC: 28 MMOL/L (ref 21–32)
CREAT SERPL-MCNC: 1.19 MG/DL (ref 0.6–1.3)
EOSINOPHIL # BLD AUTO: 0.22 THOUSAND/ΜL (ref 0–0.61)
EOSINOPHIL NFR BLD AUTO: 4 % (ref 0–6)
ERYTHROCYTE [DISTWIDTH] IN BLOOD BY AUTOMATED COUNT: 12.6 % (ref 11.6–15.1)
EST. AVERAGE GLUCOSE BLD GHB EST-MCNC: 97 MG/DL
GFR SERPL CREATININE-BSD FRML MDRD: 64 ML/MIN/1.73SQ M
GLUCOSE P FAST SERPL-MCNC: 102 MG/DL (ref 65–99)
HBA1C MFR BLD: 5 %
HCT VFR BLD AUTO: 43.1 % (ref 36.5–49.3)
HDLC SERPL-MCNC: 40 MG/DL
HGB BLD-MCNC: 15.3 G/DL (ref 12–17)
IMM GRANULOCYTES # BLD AUTO: 0.01 THOUSAND/UL (ref 0–0.2)
IMM GRANULOCYTES NFR BLD AUTO: 0 % (ref 0–2)
LDLC SERPL CALC-MCNC: 67 MG/DL (ref 0–100)
LYMPHOCYTES # BLD AUTO: 2.6 THOUSANDS/ΜL (ref 0.6–4.47)
LYMPHOCYTES NFR BLD AUTO: 42 % (ref 14–44)
MCH RBC QN AUTO: 31.6 PG (ref 26.8–34.3)
MCHC RBC AUTO-ENTMCNC: 35.5 G/DL (ref 31.4–37.4)
MCV RBC AUTO: 89 FL (ref 82–98)
MONOCYTES # BLD AUTO: 0.37 THOUSAND/ΜL (ref 0.17–1.22)
MONOCYTES NFR BLD AUTO: 6 % (ref 4–12)
NEUTROPHILS # BLD AUTO: 2.91 THOUSANDS/ΜL (ref 1.85–7.62)
NEUTS SEG NFR BLD AUTO: 47 % (ref 43–75)
NRBC BLD AUTO-RTO: 0 /100 WBCS
PLATELET # BLD AUTO: 120 THOUSANDS/UL (ref 149–390)
PMV BLD AUTO: 11.6 FL (ref 8.9–12.7)
POTASSIUM SERPL-SCNC: 4.2 MMOL/L (ref 3.5–5.3)
PROT SERPL-MCNC: 7 G/DL (ref 6.4–8.2)
RBC # BLD AUTO: 4.84 MILLION/UL (ref 3.88–5.62)
SODIUM SERPL-SCNC: 139 MMOL/L (ref 136–145)
TRIGL SERPL-MCNC: 78 MG/DL
WBC # BLD AUTO: 6.16 THOUSAND/UL (ref 4.31–10.16)

## 2021-01-11 PROCEDURE — 83036 HEMOGLOBIN GLYCOSYLATED A1C: CPT

## 2021-01-11 PROCEDURE — 80053 COMPREHEN METABOLIC PANEL: CPT

## 2021-01-11 PROCEDURE — 80061 LIPID PANEL: CPT

## 2021-01-11 PROCEDURE — 85025 COMPLETE CBC W/AUTO DIFF WBC: CPT

## 2021-01-11 PROCEDURE — 36415 COLL VENOUS BLD VENIPUNCTURE: CPT

## 2021-01-25 ENCOUNTER — OFFICE VISIT (OUTPATIENT)
Dept: INTERNAL MEDICINE CLINIC | Facility: CLINIC | Age: 66
End: 2021-01-25
Payer: COMMERCIAL

## 2021-01-25 VITALS
HEART RATE: 61 BPM | WEIGHT: 264.8 LBS | BODY MASS INDEX: 39.22 KG/M2 | HEIGHT: 69 IN | SYSTOLIC BLOOD PRESSURE: 120 MMHG | DIASTOLIC BLOOD PRESSURE: 68 MMHG | RESPIRATION RATE: 16 BRPM | OXYGEN SATURATION: 98 %

## 2021-01-25 DIAGNOSIS — E78.5 DYSLIPIDEMIA: ICD-10-CM

## 2021-01-25 DIAGNOSIS — Z00.00 WELLNESS EXAMINATION: Primary | ICD-10-CM

## 2021-01-25 DIAGNOSIS — G89.29 CHRONIC BILATERAL LOW BACK PAIN WITHOUT SCIATICA: ICD-10-CM

## 2021-01-25 DIAGNOSIS — R73.01 IMPAIRED FASTING GLUCOSE: ICD-10-CM

## 2021-01-25 DIAGNOSIS — R59.0 CERVICAL LYMPHADENOPATHY: ICD-10-CM

## 2021-01-25 DIAGNOSIS — M25.511 PAIN IN JOINT OF RIGHT SHOULDER: ICD-10-CM

## 2021-01-25 DIAGNOSIS — J30.2 SEASONAL ALLERGIES: ICD-10-CM

## 2021-01-25 DIAGNOSIS — Z12.5 SCREENING PSA (PROSTATE SPECIFIC ANTIGEN): ICD-10-CM

## 2021-01-25 DIAGNOSIS — E66.01 MORBID OBESITY WITH BMI OF 40.0-44.9, ADULT (HCC): ICD-10-CM

## 2021-01-25 DIAGNOSIS — N52.9 ERECTILE DYSFUNCTION, UNSPECIFIED ERECTILE DYSFUNCTION TYPE: ICD-10-CM

## 2021-01-25 DIAGNOSIS — D69.6 THROMBOCYTOPENIA (HCC): ICD-10-CM

## 2021-01-25 DIAGNOSIS — M54.50 CHRONIC BILATERAL LOW BACK PAIN WITHOUT SCIATICA: ICD-10-CM

## 2021-01-25 PROCEDURE — 99397 PER PM REEVAL EST PAT 65+ YR: CPT | Performed by: INTERNAL MEDICINE

## 2021-01-25 PROCEDURE — 1160F RVW MEDS BY RX/DR IN RCRD: CPT | Performed by: INTERNAL MEDICINE

## 2021-01-25 PROCEDURE — 1036F TOBACCO NON-USER: CPT | Performed by: INTERNAL MEDICINE

## 2021-01-25 PROCEDURE — 4040F PNEUMOC VAC/ADMIN/RCVD: CPT | Performed by: INTERNAL MEDICINE

## 2021-01-25 PROCEDURE — 1101F PT FALLS ASSESS-DOCD LE1/YR: CPT | Performed by: INTERNAL MEDICINE

## 2021-01-25 PROCEDURE — 3008F BODY MASS INDEX DOCD: CPT | Performed by: INTERNAL MEDICINE

## 2021-01-25 PROCEDURE — 3288F FALL RISK ASSESSMENT DOCD: CPT | Performed by: INTERNAL MEDICINE

## 2021-01-25 PROCEDURE — 3725F SCREEN DEPRESSION PERFORMED: CPT | Performed by: INTERNAL MEDICINE

## 2021-01-25 RX ORDER — NAPROXEN 500 MG/1
500 TABLET ORAL 2 TIMES DAILY PRN
Qty: 90 TABLET | Refills: 0 | Status: SHIPPED | OUTPATIENT
Start: 2021-01-25 | End: 2021-08-09 | Stop reason: SDUPTHER

## 2021-01-25 RX ORDER — TADALAFIL 20 MG/1
20 TABLET ORAL AS NEEDED
Qty: 30 TABLET | Refills: 0 | Status: SHIPPED | OUTPATIENT
Start: 2021-01-25 | End: 2021-08-09 | Stop reason: SDUPTHER

## 2021-01-25 RX ORDER — ATORVASTATIN CALCIUM 10 MG/1
10 TABLET, FILM COATED ORAL DAILY
Qty: 90 TABLET | Refills: 3 | Status: SHIPPED | OUTPATIENT
Start: 2021-01-25 | End: 2021-08-09 | Stop reason: SDUPTHER

## 2021-01-25 NOTE — PROGRESS NOTES
Assessment/Plan:    Impaired fasting glucose  A1C normal    Cervical lymphadenopathy  Stable in size if not a little smaller  CT unremarkable in 2017  ENT eval in 2020 and no further intervention suggested    Dyslipidemia  Well controlled on atorvastatin    Morbid obesity with BMI of 40 0-44 9, adult ([facility])  BMI Counseling: Body mass index is 39 1 kg/m²  The BMI is above normal  Nutrition recommendations include decreasing overall calorie intake  Seasonal allergies  Continue antihistamine nasal sp[ray and antihistamine eye drops OTC         Problem List Items Addressed This Visit        Endocrine    Impaired fasting glucose     A1C normal         Relevant Orders    Comprehensive metabolic panel    HEMOGLOBIN A1C W/ EAG ESTIMATION       Immune and Lymphatic    Cervical lymphadenopathy     Stable in size if not a little smaller  CT unremarkable in 2017  ENT eval in 2020 and no further intervention suggested            Other    Erectile dysfunction    Relevant Medications    tadalafil (Cialis) 20 MG tablet    Dyslipidemia     Well controlled on atorvastatin         Relevant Medications    atorvastatin (LIPITOR) 10 mg tablet    Other Relevant Orders    Comprehensive metabolic panel    Lipid Panel with Direct LDL reflex    Morbid obesity with BMI of 40 0-44 9, adult ([facility])     BMI Counseling: Body mass index is 39 1 kg/m²  The BMI is above normal  Nutrition recommendations include decreasing overall calorie intake             Seasonal allergies     Continue antihistamine nasal sp[ray and antihistamine eye drops OTC           Other Visit Diagnoses     Wellness examination    -  Primary    Thrombocytopenia (HCC)        Relevant Orders    CBC and Platelet    Screening PSA (prostate specific antigen)        Relevant Orders    PSA, Total Screen    Pain in joint of right shoulder        Relevant Medications    naproxen (NAPROSYN) 500 mg tablet    Chronic bilateral low back pain without sciatica        Relevant Medications naproxen (NAPROSYN) 500 mg tablet            Subjective:      Patient ID: Humberto Delgado is a 72 y o  male  HPI  Wellness  Up to date with metabolic screening  PSA due in March  Colonoscopy in 2017 and recommended to repeat in 5-10 y   Following a low calorie 1500cal diet and staying active  He has lost 50lbs in the past year    The following portions of the patient's history were reviewed and updated as appropriate: allergies, current medications, past family history, past medical history, past social history, past surgical history and problem list     Review of Systems   Constitutional: Negative for chills, fatigue, fever and unexpected weight change  HENT: Positive for postnasal drip (uses Astelin PRN)  Negative for congestion, rhinorrhea and sore throat  Eyes: Negative for discharge  Gritty eyes from allergies  Using OTC drops   Respiratory: Negative for cough and shortness of breath  Cardiovascular: Negative for chest pain, palpitations and leg swelling  Gastrointestinal: Negative for abdominal pain, constipation, diarrhea, nausea and vomiting  Genitourinary: Negative for difficulty urinating  Musculoskeletal: Negative for arthralgias and myalgias  Neurological: Negative for dizziness and headaches  Objective:      /68   Pulse 61   Resp 16   Ht 5' 9" (1 753 m)   Wt 120 kg (264 lb 12 8 oz)   SpO2 98%   BMI 39 10 kg/m²          Physical Exam  Constitutional:       General: He is not in acute distress  Appearance: He is well-developed  He is obese  He is not ill-appearing, toxic-appearing or diaphoretic  HENT:      Head: Normocephalic and atraumatic  Right Ear: External ear normal  There is no impacted cerumen  Left Ear: External ear normal  There is no impacted cerumen  Eyes:      Conjunctiva/sclera: Conjunctivae normal    Neck:      Musculoskeletal: Neck supple  Cardiovascular:      Rate and Rhythm: Normal rate and regular rhythm        Heart sounds: Normal heart sounds  No murmur  Pulmonary:      Effort: Pulmonary effort is normal  No respiratory distress  Breath sounds: Normal breath sounds  No wheezing or rales  Abdominal:      General: There is no distension  Palpations: Abdomen is soft  There is no mass  Tenderness: There is no abdominal tenderness  There is no guarding or rebound  Musculoskeletal:      Right lower leg: No edema  Left lower leg: No edema  Skin:     General: Skin is warm and dry  Neurological:      Mental Status: He is alert and oriented to person, place, and time  Psychiatric:         Mood and Affect: Mood normal          Behavior: Behavior normal          Thought Content:  Thought content normal          Judgment: Judgment normal

## 2021-01-25 NOTE — ASSESSMENT & PLAN NOTE
Stable in size if not a little smaller  CT unremarkable in 2017  ENT eval in 2020 and no further intervention suggested

## 2021-01-25 NOTE — ASSESSMENT & PLAN NOTE
BMI Counseling: Body mass index is 39 1 kg/m²  The BMI is above normal  Nutrition recommendations include decreasing overall calorie intake

## 2021-02-13 DIAGNOSIS — Z23 ENCOUNTER FOR IMMUNIZATION: ICD-10-CM

## 2021-02-15 ENCOUNTER — IMMUNIZATIONS (OUTPATIENT)
Dept: FAMILY MEDICINE CLINIC | Facility: HOSPITAL | Age: 66
End: 2021-02-15

## 2021-02-15 DIAGNOSIS — Z23 ENCOUNTER FOR IMMUNIZATION: Primary | ICD-10-CM

## 2021-02-15 PROCEDURE — 0001A SARS-COV-2 / COVID-19 MRNA VACCINE (PFIZER-BIONTECH) 30 MCG: CPT | Performed by: PHYSICIAN ASSISTANT

## 2021-02-15 PROCEDURE — 91300 SARS-COV-2 / COVID-19 MRNA VACCINE (PFIZER-BIONTECH) 30 MCG: CPT | Performed by: PHYSICIAN ASSISTANT

## 2021-03-09 ENCOUNTER — IMMUNIZATIONS (OUTPATIENT)
Dept: FAMILY MEDICINE CLINIC | Facility: HOSPITAL | Age: 66
End: 2021-03-09

## 2021-03-09 DIAGNOSIS — Z23 ENCOUNTER FOR IMMUNIZATION: Primary | ICD-10-CM

## 2021-03-09 PROCEDURE — 0002A SARS-COV-2 / COVID-19 MRNA VACCINE (PFIZER-BIONTECH) 30 MCG: CPT

## 2021-03-09 PROCEDURE — 91300 SARS-COV-2 / COVID-19 MRNA VACCINE (PFIZER-BIONTECH) 30 MCG: CPT

## 2021-03-22 ENCOUNTER — OFFICE VISIT (OUTPATIENT)
Dept: SLEEP CENTER | Facility: CLINIC | Age: 66
End: 2021-03-22
Payer: COMMERCIAL

## 2021-03-22 VITALS
HEART RATE: 72 BPM | SYSTOLIC BLOOD PRESSURE: 112 MMHG | BODY MASS INDEX: 40.14 KG/M2 | DIASTOLIC BLOOD PRESSURE: 60 MMHG | HEIGHT: 69 IN | WEIGHT: 271 LBS

## 2021-03-22 DIAGNOSIS — F51.12 INSUFFICIENT SLEEP SYNDROME: ICD-10-CM

## 2021-03-22 DIAGNOSIS — E66.01 MORBID OBESITY WITH BMI OF 40.0-44.9, ADULT (HCC): ICD-10-CM

## 2021-03-22 DIAGNOSIS — J30.2 SEASONAL ALLERGIES: ICD-10-CM

## 2021-03-22 DIAGNOSIS — G47.33 OSA (OBSTRUCTIVE SLEEP APNEA): Primary | ICD-10-CM

## 2021-03-22 DIAGNOSIS — IMO0002 SLEEP-RELATED HYPOVENTILATION: ICD-10-CM

## 2021-03-22 PROCEDURE — 3008F BODY MASS INDEX DOCD: CPT | Performed by: INTERNAL MEDICINE

## 2021-03-22 PROCEDURE — 1160F RVW MEDS BY RX/DR IN RCRD: CPT | Performed by: INTERNAL MEDICINE

## 2021-03-22 PROCEDURE — 99214 OFFICE O/P EST MOD 30 MIN: CPT | Performed by: INTERNAL MEDICINE

## 2021-03-22 PROCEDURE — 1036F TOBACCO NON-USER: CPT | Performed by: INTERNAL MEDICINE

## 2021-03-22 NOTE — PROGRESS NOTES
Follow-Up Note - Sleep Center   Ancelmo Mcmahon  72 y o  male  LDS:[birth year]  [place]:[id number]    CC: I saw this patient for follow-up in clinic today for Sleep disordered breathing, Coexisting Sleep and Medical Problems  Results of prior studies in 2015: The diagnostic study demonstrated AHI of 69 per hour  Minimum oxygen saturation was 70% any spent is 96 8% of time asleep with saturations below 90%  During the subsequent therapeutic study, sleep disordered breathing was adequately remediated with BiPAP at 19/14 cm H2O  PFSH, Problem List, Medications & Allergies were reviewed in EMR  Interval changes: none reported  He  has a past medical history of CPAP (continuous positive airway pressure) dependence, Elevated serum creatinine, Morbid obesity ([facility] [address] ) (1/11/2016), Screening PSA (prostate specific antigen) (4/13/2018), Sleep apnea, and Tinnitus, left  He has a current medication list which includes the following prescription(s): atorvastatin, azelastine hcl, naproxen, and tadalafil  ROS: as attached  Significant for approximately 30 lb intentional weight loss since his last visit  PHYSIOLOGICAL DATA REVIEW AND INTERPRETATION:   using PAP > 4 hours/night 43%  Estimated SHUN 2 5/hour with pressure of 17 5/13 5cm H2O @90th percentile  Not use the machine for 5 out of the past 30 days while he was away from home  At other times, machine is not delivering the pressure that he needs any ends up removing it  Compliance: reasonable; Sleep disordered breathing:stable & within target range    SUBJECTIVE: Regarding use of PAP, Travis Baumann reports:   · He is experiencing some adverse effects: dry mouth/throat and dry nose  · He is benefiting from use: sleeping better with CPAP  Sleep Routine: He reports getting 6-7 hrs sleep  ; he no difficulty initiating or maintaining sleep   He awakens spontaneously and feels refreshed when he uses CPAP  Travis Baumann denies Excessive Daytime Sleepiness  but rated himself at Total score: 8 /24 on the [place] Sleepiness Scale  Habits: reports that he has never smoked  He has never used smokeless tobacco ,  reports current alcohol use ,  reports no history of drug use , Caffeine use: limited , Exercise routine: regular    EXAM: /60   Pulse 72   Ht 5' 9" (1 753 m)   Wt 123 kg (271 lb)   BMI 40 02 kg/m²     Patient is well groomed; well appearing  Skin/Extrem: col & hydration normal; no edema  Psych: cooperativeand in no distress  Mental state:appears normal   CNS: Alert, orientated, clear & coherent speech  H&N: EOMI; NC/AT:no facial pressure marks, no rashes  Physical findings otherwise essentially unchanged from previous  IMPRESSION: Problem List Items & Comorbidities Addressed this Visit    1  JAZMYN (obstructive sleep apnea)  Bipap DME   2  Sleep-related hypoventilation  Bipap DME   3  Insufficient sleep syndrome     4  Seasonal allergies     5  Morbid obesity with BMI of 40 0-44 9, adult ([facility] [address] )         PLAN:  1  I reviewed results of prior studies and physiologic data with the patient  I discussed treatment options with risks and benefits  2  Treatment with  PAP is medically necessary and Fox Deaner is agreable to continue use  3  Care of equipment, methods to improve comfort using PAP and importance of compliance with therapy were discussed  4  The patient's current unit has now reached its 5 yr reasonable, useful life and needs to be replaced  5  Pressure setting: change BiPAP auto cmH2O     6  Rx provided to replace supplies and Care coordinated with DME provider  7  Discussed  strategies for weight reduction  8  I also advised allowing sufficient opportunity for sleep  9  Follow-up is advised in 2 months or sooner if needed to monitor progress, compliance and to adjust therapy  Thank you for allowing me to participate in the care of this patient      Sincerely,    Authenticated electronically by Tiana Cuellar MD on 13/71/72   Board Certified Specialist

## 2021-03-22 NOTE — PATIENT INSTRUCTIONS

## 2021-03-23 ENCOUNTER — TELEPHONE (OUTPATIENT)
Dept: SLEEP CENTER | Facility: CLINIC | Age: 66
End: 2021-03-23

## 2021-03-23 NOTE — TELEPHONE ENCOUNTER
Patient has DME set up scheduled 4/13/21 in [place]  Appointment information emailed to Dg escobar

## 2021-07-28 ENCOUNTER — APPOINTMENT (OUTPATIENT)
Dept: LAB | Facility: CLINIC | Age: 66
End: 2021-07-28
Payer: COMMERCIAL

## 2021-07-28 DIAGNOSIS — D69.6 THROMBOCYTOPENIA (HCC): ICD-10-CM

## 2021-07-28 DIAGNOSIS — R73.01 IMPAIRED FASTING GLUCOSE: ICD-10-CM

## 2021-07-28 DIAGNOSIS — E78.5 DYSLIPIDEMIA: ICD-10-CM

## 2021-07-28 DIAGNOSIS — Z12.5 SCREENING PSA (PROSTATE SPECIFIC ANTIGEN): ICD-10-CM

## 2021-07-28 LAB
ALBUMIN SERPL BCP-MCNC: 3.8 G/DL (ref 3.5–5)
ALP SERPL-CCNC: 54 U/L (ref 46–116)
ALT SERPL W P-5'-P-CCNC: 35 U/L (ref 12–78)
ANION GAP SERPL CALCULATED.3IONS-SCNC: 9 MMOL/L (ref 4–13)
AST SERPL W P-5'-P-CCNC: 16 U/L (ref 5–45)
BILIRUB SERPL-MCNC: 0.65 MG/DL (ref 0.2–1)
BUN SERPL-MCNC: 22 MG/DL (ref 5–25)
CALCIUM SERPL-MCNC: 9 MG/DL (ref 8.3–10.1)
CHLORIDE SERPL-SCNC: 106 MMOL/L (ref 100–108)
CHOLEST SERPL-MCNC: 169 MG/DL (ref 50–200)
CO2 SERPL-SCNC: 27 MMOL/L (ref 21–32)
CREAT SERPL-MCNC: 1.27 MG/DL (ref 0.6–1.3)
ERYTHROCYTE [DISTWIDTH] IN BLOOD BY AUTOMATED COUNT: 12.5 % (ref 11.6–15.1)
EST. AVERAGE GLUCOSE BLD GHB EST-MCNC: 94 MG/DL
GFR SERPL CREATININE-BSD FRML MDRD: 59 ML/MIN/1.73SQ M
GLUCOSE P FAST SERPL-MCNC: 109 MG/DL (ref 65–99)
HBA1C MFR BLD: 4.9 %
HCT VFR BLD AUTO: 45.1 % (ref 36.5–49.3)
HDLC SERPL-MCNC: 44 MG/DL
HGB BLD-MCNC: 15.9 G/DL (ref 12–17)
LDLC SERPL CALC-MCNC: 98 MG/DL (ref 0–100)
MCH RBC QN AUTO: 31.8 PG (ref 26.8–34.3)
MCHC RBC AUTO-ENTMCNC: 35.3 G/DL (ref 31.4–37.4)
MCV RBC AUTO: 90 FL (ref 82–98)
PLATELET # BLD AUTO: 124 THOUSANDS/UL (ref 149–390)
PMV BLD AUTO: 12.5 FL (ref 8.9–12.7)
POTASSIUM SERPL-SCNC: 4.1 MMOL/L (ref 3.5–5.3)
PROT SERPL-MCNC: 7.3 G/DL (ref 6.4–8.2)
PSA SERPL-MCNC: 0.5 NG/ML (ref 0–4)
RBC # BLD AUTO: 5 MILLION/UL (ref 3.88–5.62)
SODIUM SERPL-SCNC: 142 MMOL/L (ref 136–145)
TRIGL SERPL-MCNC: 133 MG/DL
WBC # BLD AUTO: 6.4 THOUSAND/UL (ref 4.31–10.16)

## 2021-07-28 PROCEDURE — G0103 PSA SCREENING: HCPCS

## 2021-07-28 PROCEDURE — 36415 COLL VENOUS BLD VENIPUNCTURE: CPT

## 2021-07-28 PROCEDURE — 85027 COMPLETE CBC AUTOMATED: CPT

## 2021-07-28 PROCEDURE — 80061 LIPID PANEL: CPT

## 2021-07-28 PROCEDURE — 83036 HEMOGLOBIN GLYCOSYLATED A1C: CPT

## 2021-07-28 PROCEDURE — 80053 COMPREHEN METABOLIC PANEL: CPT

## 2021-08-09 ENCOUNTER — OFFICE VISIT (OUTPATIENT)
Dept: INTERNAL MEDICINE CLINIC | Facility: CLINIC | Age: 66
End: 2021-08-09
Payer: COMMERCIAL

## 2021-08-09 VITALS
OXYGEN SATURATION: 97 % | WEIGHT: 276 LBS | TEMPERATURE: 98 F | BODY MASS INDEX: 40.88 KG/M2 | DIASTOLIC BLOOD PRESSURE: 72 MMHG | HEIGHT: 69 IN | SYSTOLIC BLOOD PRESSURE: 132 MMHG | HEART RATE: 65 BPM

## 2021-08-09 DIAGNOSIS — G47.33 OSA (OBSTRUCTIVE SLEEP APNEA): ICD-10-CM

## 2021-08-09 DIAGNOSIS — J30.2 SEASONAL ALLERGIES: ICD-10-CM

## 2021-08-09 DIAGNOSIS — N52.9 ERECTILE DYSFUNCTION, UNSPECIFIED ERECTILE DYSFUNCTION TYPE: ICD-10-CM

## 2021-08-09 DIAGNOSIS — M25.511 PAIN IN JOINT OF RIGHT SHOULDER: ICD-10-CM

## 2021-08-09 DIAGNOSIS — E78.5 DYSLIPIDEMIA: Primary | ICD-10-CM

## 2021-08-09 DIAGNOSIS — G89.29 CHRONIC BILATERAL LOW BACK PAIN WITHOUT SCIATICA: ICD-10-CM

## 2021-08-09 DIAGNOSIS — M54.50 CHRONIC BILATERAL LOW BACK PAIN WITHOUT SCIATICA: ICD-10-CM

## 2021-08-09 DIAGNOSIS — Z11.4 SCREENING FOR HIV (HUMAN IMMUNODEFICIENCY VIRUS): ICD-10-CM

## 2021-08-09 DIAGNOSIS — E66.01 MORBID OBESITY WITH BMI OF 40.0-44.9, ADULT (HCC): ICD-10-CM

## 2021-08-09 PROCEDURE — 1160F RVW MEDS BY RX/DR IN RCRD: CPT | Performed by: INTERNAL MEDICINE

## 2021-08-09 PROCEDURE — 3008F BODY MASS INDEX DOCD: CPT | Performed by: INTERNAL MEDICINE

## 2021-08-09 PROCEDURE — 1036F TOBACCO NON-USER: CPT | Performed by: INTERNAL MEDICINE

## 2021-08-09 PROCEDURE — 99214 OFFICE O/P EST MOD 30 MIN: CPT | Performed by: INTERNAL MEDICINE

## 2021-08-09 RX ORDER — NAPROXEN 500 MG/1
500 TABLET ORAL 2 TIMES DAILY PRN
Qty: 90 TABLET | Refills: 0 | Status: SHIPPED | OUTPATIENT
Start: 2021-08-09

## 2021-08-09 RX ORDER — ATORVASTATIN CALCIUM 10 MG/1
10 TABLET, FILM COATED ORAL DAILY
Qty: 90 TABLET | Refills: 3 | Status: SHIPPED | OUTPATIENT
Start: 2021-08-09

## 2021-08-09 RX ORDER — TADALAFIL 20 MG/1
20 TABLET ORAL AS NEEDED
Qty: 30 TABLET | Refills: 3 | Status: SHIPPED | OUTPATIENT
Start: 2021-08-09

## 2021-08-09 RX ORDER — AZELASTINE HYDROCHLORIDE 137 UG/1
1 SPRAY, METERED NASAL 2 TIMES DAILY
Qty: 90 ML | Refills: 1 | Status: SHIPPED | OUTPATIENT
Start: 2021-08-09 | End: 2022-02-15 | Stop reason: SDUPTHER

## 2021-08-09 NOTE — PROGRESS NOTES
Assessment/Plan:    JAZMYN (obstructive sleep apnea)  Compliant with bipap and continues to benefit from treatment    Morbid obesity with BMI of 40 0-44 9, adult ([facility])  BMI Counseling: Body mass index is 40 76 kg/m²  The BMI is above normal  Nutrition recommendations include decreasing overall calorie intake  Dry skin-use Eucerin original that comes in a tub for dry skin     Problem List Items Addressed This Visit        Respiratory    JAZMYN (obstructive sleep apnea)     Compliant with bipap and continues to benefit from treatment            Other    Erectile dysfunction    Relevant Medications    tadalafil (Cialis) 20 MG tablet    Dyslipidemia - Primary    Relevant Medications    atorvastatin (LIPITOR) 10 mg tablet    Other Relevant Orders    CBC and differential    Comprehensive metabolic panel    Lipid Panel with Direct LDL reflex    Seasonal allergies    Relevant Medications    Azelastine HCl 137 MCG/SPRAY SOLN    Morbid obesity with BMI of 40 0-44 9, adult ([facility])     BMI Counseling: Body mass index is 40 76 kg/m²  The BMI is above normal  Nutrition recommendations include decreasing overall calorie intake  Other Visit Diagnoses     Screening for HIV (human immunodeficiency virus)        Relevant Orders    HIV 1/2 Antigen/Antibody (4th Generation) w Reflex [facility]    Pain in joint of right shoulder        Relevant Medications    naproxen (NAPROSYN) 500 mg tablet    Chronic bilateral low back pain without sciatica        Relevant Medications    naproxen (NAPROSYN) 500 mg tablet            Subjective:      Patient ID: Charlee Salomon is a 72 y o  male      HPI  Here for a follow up  Feeling well overall  Recent labs reviewed-   Cholesterol has gone up but still at goal on atorvastatin 10mg  His diet has been poor x 2 weeks with visitors at home and he has gained weight  Platelet count stable slightly low    The following portions of the patient's history were reviewed and updated as appropriate: allergies, current medications, past family history, past medical history, past social history, past surgical history and problem list     Review of Systems   Constitutional: Negative for chills, fatigue and fever  Unexpected weight change: recent weight gain from poor diet  HENT: Positive for congestion  Uses azelastine daily   Respiratory: Negative for cough and shortness of breath  Cardiovascular: Negative for chest pain, palpitations and leg swelling  Gastrointestinal: Negative for abdominal pain, constipation, diarrhea, nausea and vomiting  Genitourinary: Negative for difficulty urinating  Musculoskeletal: Positive for arthralgias (shoulders relieved by naproxen)  Negative for myalgias  Skin:        Dry skin on the legs   Neurological: Negative for dizziness and headaches  Objective:    /72   Pulse 65   Temp 98 °F (36 7 °C) (Temporal)   Ht 5' 9" (1 753 m)   Wt 125 kg (276 lb)   SpO2 97%   BMI 40 76 kg/m²        Physical Exam  Constitutional:       General: He is not in acute distress  Appearance: He is well-developed  He is obese  He is not ill-appearing, toxic-appearing or diaphoretic  HENT:      Head: Normocephalic and atraumatic  Eyes:      Conjunctiva/sclera: Conjunctivae normal    Neck:      Comments: Left posterior cervical non tender, movable  Cardiovascular:      Rate and Rhythm: Normal rate and regular rhythm  Heart sounds: Normal heart sounds  No murmur heard  Pulmonary:      Effort: Pulmonary effort is normal  No respiratory distress  Breath sounds: Normal breath sounds  No wheezing or rales  Abdominal:      General: There is no distension  Palpations: Abdomen is soft  There is no mass  Tenderness: There is no abdominal tenderness  There is no guarding or rebound  Musculoskeletal:      Cervical back: Neck supple  Right lower leg: No edema  Left lower leg: No edema  Skin:     General: Skin is warm and dry        Comments: Dry flaky skin on the left leg around the ankle   Neurological:      Mental Status: He is alert and oriented to person, place, and time  Psychiatric:         Mood and Affect: Mood normal          Behavior: Behavior normal          Thought Content:  Thought content normal          Judgment: Judgment normal

## 2021-08-09 NOTE — ASSESSMENT & PLAN NOTE
BMI Counseling: Body mass index is 40 76 kg/m²  The BMI is above normal  Nutrition recommendations include decreasing overall calorie intake

## 2021-08-16 ENCOUNTER — OFFICE VISIT (OUTPATIENT)
Dept: SLEEP CENTER | Facility: CLINIC | Age: 66
End: 2021-08-16
Payer: COMMERCIAL

## 2021-08-16 VITALS
HEIGHT: 69 IN | BODY MASS INDEX: 41.62 KG/M2 | SYSTOLIC BLOOD PRESSURE: 130 MMHG | WEIGHT: 281 LBS | DIASTOLIC BLOOD PRESSURE: 80 MMHG | HEART RATE: 64 BPM

## 2021-08-16 DIAGNOSIS — J30.2 SEASONAL ALLERGIES: ICD-10-CM

## 2021-08-16 DIAGNOSIS — E66.01 MORBID OBESITY WITH BMI OF 40.0-44.9, ADULT (HCC): ICD-10-CM

## 2021-08-16 DIAGNOSIS — R68.2 DRY MOUTH: ICD-10-CM

## 2021-08-16 DIAGNOSIS — G47.33 OSA (OBSTRUCTIVE SLEEP APNEA): Primary | ICD-10-CM

## 2021-08-16 DIAGNOSIS — F51.12 INSUFFICIENT SLEEP SYNDROME: ICD-10-CM

## 2021-08-16 PROCEDURE — 1036F TOBACCO NON-USER: CPT | Performed by: INTERNAL MEDICINE

## 2021-08-16 PROCEDURE — 1160F RVW MEDS BY RX/DR IN RCRD: CPT | Performed by: INTERNAL MEDICINE

## 2021-08-16 PROCEDURE — 3008F BODY MASS INDEX DOCD: CPT | Performed by: INTERNAL MEDICINE

## 2021-08-16 PROCEDURE — 99214 OFFICE O/P EST MOD 30 MIN: CPT | Performed by: INTERNAL MEDICINE

## 2021-08-16 NOTE — PROGRESS NOTES
Follow-Up Note - Sleep Center   Angélica Howell  72 y o  male  XLD:[birth year]  WSS:[id number]  DOS:8/16/2021    CC: I saw this patient for follow-up in clinic today for Sleep disordered breathing, Coexisting Sleep and Medical Problems  He got a replacement [employer] med machine few months ago  Results of prior studies in 2015: The diagnostic study demonstrated AHI of 69 per hour  Minimum oxygen saturation was 70% any spent is 96 8% of time asleep with saturations below 90%  During the subsequent therapeutic study, sleep disordered breathing was adequately remediated with BiPAP at 19/14 cm H2O  PFSH, Problem List, Medications & Allergies were reviewed in EMR  Interval changes: none reported  He  has a past medical history of CPAP (continuous positive airway pressure) dependence, Elevated serum creatinine, Morbid obesity ([address] ) (1/11/2016), Screening PSA (prostate specific antigen) (4/13/2018), Sleep apnea, and Tinnitus, left  He has a current medication list which includes the following prescription(s): atorvastatin, azelastine hcl, naproxen, and tadalafil  PHYSIOLOGICAL DATA REVIEW AND INTERPRETATION:   using PAP > 4 hours/night 63%  Estimated SHUN <1/hour with pressure of 18/14cm H2O @90th percentile; Compliance: good, but at times he removes the mask before 4 hours because of mask leaks ; Sleep disordered breathing:stable & within target range; Patient has been using ozone based or other devices to sanitize the machine  SUBJECTIVE: Regarding use of PAP, Shen Sommer reports:   · He is experiencing some adverse effects: dry mouth/throat; has not noticed any fibres or foreign material in air line  · He is benefiting from use: sleeping better   Sleep Routine: Shen Sommer reports getting 6-7 hrs sleep  ; he has no difficulty initiating or maintaining sleep   He arises spontaneously and feels more refreshed since on Rx  Shen Sommer denies Excessive Daytime Sleepiness,  and rated himself at Total score: 2 /24 on the [place] Sleepiness Scale  Habits: reports that he has never smoked  He has never used smokeless tobacco ,  reports current alcohol use ,  reports no history of drug use , Caffeine use: limited , Exercise routine: regular    ROS: as attached  Significant for approximately 10 lb weight gain since his last visit  Allergies are controlled with use of intranasal antihistamines  Ferol Neel EXAM: /80   Pulse 64   Ht 5' 9" (1 753 m)   Wt 127 kg (281 lb)   BMI 41 50 kg/m²     Patient is well groomed; well appearing  H&N: EOMI; NC/AT:no facial pressure marks, no rashes  Skin/Extrem: col & hydration normal; no edema  Psych: cooperativeand in no distress  Mental state:appears normal   Resp: Respiratory effort is normal  CNS: Alert, orientated, clear & coherent speech  Physical findings otherwise essentially unchanged from previous  IMPRESSION: Problem List Items & Comorbidities Addressed this Visit    1  JAZMYN (obstructive sleep apnea)     2  Insufficient sleep syndrome     3  Dry mouth     4  Seasonal allergies     5  Morbid obesity with BMI of 40 0-44 9, adult ([facility] [address] )         PLAN:  1  I reviewed results of prior studies and physiologic data with the patient  I discussed treatment options with risks and benefits  2  Treatment with  PAP is medically necessary and Jez Lopez is agreable to continue use  3  Care of equipment, methods to improve comfort using PAP and importance of compliance with therapy were discussed  4  Pressure setting: continue 18/14 cmH2O     5  Rx provided to replace  supplies and Care coordinated with DME provider  He was advised not to use any ozone based or un approved cleaning devices  6  Discussed strategies for weight reduction  7  He was provided with information to register his old [employer] machine  8  Also advised allowing sufficient opportunity for sleep  9  Follow-up is advised in 1 year or sooner if needed to monitor progress, compliance and to adjust therapy        Thank you for allowing me to participate in the care of this patient  Sincerely,    Authenticated electronically by Medina Mays MD on 84/10/14   Board Certified Specialist     Portions of the record may have been created with voice recognition software  Occasional wrong word or "sound a like" substitutions may have occurred due to the inherent limitations of voice recognition software  There may also be notations and random deletions of words or characters from malfunctioning software  Read the chart carefully and recognize, using context, where substitutions/deletions have occurred

## 2021-08-17 ENCOUNTER — TELEPHONE (OUTPATIENT)
Dept: SLEEP CENTER | Facility: CLINIC | Age: 66
End: 2021-08-17

## 2022-02-10 ENCOUNTER — APPOINTMENT (OUTPATIENT)
Dept: LAB | Facility: CLINIC | Age: 67
End: 2022-02-10
Payer: COMMERCIAL

## 2022-02-10 DIAGNOSIS — Z11.4 SCREENING FOR HIV (HUMAN IMMUNODEFICIENCY VIRUS): ICD-10-CM

## 2022-02-10 DIAGNOSIS — E78.5 DYSLIPIDEMIA: ICD-10-CM

## 2022-02-10 LAB
ALBUMIN SERPL BCP-MCNC: 3.6 G/DL (ref 3.5–5)
ALP SERPL-CCNC: 52 U/L (ref 46–116)
ALT SERPL W P-5'-P-CCNC: 33 U/L (ref 12–78)
ANION GAP SERPL CALCULATED.3IONS-SCNC: 8 MMOL/L (ref 4–13)
AST SERPL W P-5'-P-CCNC: 15 U/L (ref 5–45)
BASOPHILS # BLD AUTO: 0.05 THOUSANDS/ΜL (ref 0–0.1)
BASOPHILS NFR BLD AUTO: 1 % (ref 0–1)
BILIRUB SERPL-MCNC: 0.74 MG/DL (ref 0.2–1)
BUN SERPL-MCNC: 20 MG/DL (ref 5–25)
CALCIUM SERPL-MCNC: 9 MG/DL (ref 8.3–10.1)
CHLORIDE SERPL-SCNC: 104 MMOL/L (ref 100–108)
CHOLEST SERPL-MCNC: 141 MG/DL
CO2 SERPL-SCNC: 29 MMOL/L (ref 21–32)
CREAT SERPL-MCNC: 1.39 MG/DL (ref 0.6–1.3)
EOSINOPHIL # BLD AUTO: 0.2 THOUSAND/ΜL (ref 0–0.61)
EOSINOPHIL NFR BLD AUTO: 3 % (ref 0–6)
ERYTHROCYTE [DISTWIDTH] IN BLOOD BY AUTOMATED COUNT: 12.4 % (ref 11.6–15.1)
GFR SERPL CREATININE-BSD FRML MDRD: 52 ML/MIN/1.73SQ M
GLUCOSE P FAST SERPL-MCNC: 110 MG/DL (ref 65–99)
HCT VFR BLD AUTO: 44 % (ref 36.5–49.3)
HDLC SERPL-MCNC: 44 MG/DL
HGB BLD-MCNC: 15.7 G/DL (ref 12–17)
IMM GRANULOCYTES # BLD AUTO: 0.03 THOUSAND/UL (ref 0–0.2)
IMM GRANULOCYTES NFR BLD AUTO: 0 % (ref 0–2)
LDLC SERPL CALC-MCNC: 74 MG/DL (ref 0–100)
LYMPHOCYTES # BLD AUTO: 2.88 THOUSANDS/ΜL (ref 0.6–4.47)
LYMPHOCYTES NFR BLD AUTO: 39 % (ref 14–44)
MCH RBC QN AUTO: 32.3 PG (ref 26.8–34.3)
MCHC RBC AUTO-ENTMCNC: 35.7 G/DL (ref 31.4–37.4)
MCV RBC AUTO: 91 FL (ref 82–98)
MONOCYTES # BLD AUTO: 0.53 THOUSAND/ΜL (ref 0.17–1.22)
MONOCYTES NFR BLD AUTO: 7 % (ref 4–12)
NEUTROPHILS # BLD AUTO: 3.68 THOUSANDS/ΜL (ref 1.85–7.62)
NEUTS SEG NFR BLD AUTO: 50 % (ref 43–75)
NRBC BLD AUTO-RTO: 0 /100 WBCS
PLATELET # BLD AUTO: 126 THOUSANDS/UL (ref 149–390)
PMV BLD AUTO: 12.1 FL (ref 8.9–12.7)
POTASSIUM SERPL-SCNC: 3.8 MMOL/L (ref 3.5–5.3)
PROT SERPL-MCNC: 7.1 G/DL (ref 6.4–8.2)
RBC # BLD AUTO: 4.86 MILLION/UL (ref 3.88–5.62)
SODIUM SERPL-SCNC: 141 MMOL/L (ref 136–145)
TRIGL SERPL-MCNC: 115 MG/DL
WBC # BLD AUTO: 7.37 THOUSAND/UL (ref 4.31–10.16)

## 2022-02-10 PROCEDURE — 80053 COMPREHEN METABOLIC PANEL: CPT

## 2022-02-10 PROCEDURE — 85025 COMPLETE CBC W/AUTO DIFF WBC: CPT

## 2022-02-10 PROCEDURE — 87389 HIV-1 AG W/HIV-1&-2 AB AG IA: CPT

## 2022-02-10 PROCEDURE — 36415 COLL VENOUS BLD VENIPUNCTURE: CPT

## 2022-02-10 PROCEDURE — 80061 LIPID PANEL: CPT

## 2022-02-11 LAB — HIV 1+2 AB+HIV1 P24 AG SERPL QL IA: NORMAL

## 2022-02-15 ENCOUNTER — OFFICE VISIT (OUTPATIENT)
Dept: INTERNAL MEDICINE CLINIC | Facility: CLINIC | Age: 67
End: 2022-02-15
Payer: COMMERCIAL

## 2022-02-15 VITALS
HEIGHT: 69 IN | BODY MASS INDEX: 41.89 KG/M2 | DIASTOLIC BLOOD PRESSURE: 82 MMHG | HEART RATE: 64 BPM | RESPIRATION RATE: 16 BRPM | OXYGEN SATURATION: 99 % | WEIGHT: 282.8 LBS | SYSTOLIC BLOOD PRESSURE: 118 MMHG

## 2022-02-15 DIAGNOSIS — R59.0 CERVICAL LYMPHADENOPATHY: ICD-10-CM

## 2022-02-15 DIAGNOSIS — D69.6 THROMBOCYTOPENIA (HCC): ICD-10-CM

## 2022-02-15 DIAGNOSIS — R73.01 IMPAIRED FASTING GLUCOSE: ICD-10-CM

## 2022-02-15 DIAGNOSIS — E78.5 DYSLIPIDEMIA: Primary | ICD-10-CM

## 2022-02-15 DIAGNOSIS — E66.01 MORBID OBESITY WITH BMI OF 40.0-44.9, ADULT (HCC): ICD-10-CM

## 2022-02-15 DIAGNOSIS — Z00.00 ANNUAL PHYSICAL EXAM: ICD-10-CM

## 2022-02-15 DIAGNOSIS — Z12.5 SCREENING PSA (PROSTATE SPECIFIC ANTIGEN): ICD-10-CM

## 2022-02-15 DIAGNOSIS — J30.2 SEASONAL ALLERGIES: ICD-10-CM

## 2022-02-15 DIAGNOSIS — G47.33 OSA (OBSTRUCTIVE SLEEP APNEA): ICD-10-CM

## 2022-02-15 PROCEDURE — 99397 PER PM REEVAL EST PAT 65+ YR: CPT | Performed by: INTERNAL MEDICINE

## 2022-02-15 PROCEDURE — 1036F TOBACCO NON-USER: CPT | Performed by: INTERNAL MEDICINE

## 2022-02-15 PROCEDURE — 1101F PT FALLS ASSESS-DOCD LE1/YR: CPT | Performed by: INTERNAL MEDICINE

## 2022-02-15 PROCEDURE — 3288F FALL RISK ASSESSMENT DOCD: CPT | Performed by: INTERNAL MEDICINE

## 2022-02-15 PROCEDURE — 3008F BODY MASS INDEX DOCD: CPT | Performed by: INTERNAL MEDICINE

## 2022-02-15 PROCEDURE — 1160F RVW MEDS BY RX/DR IN RCRD: CPT | Performed by: INTERNAL MEDICINE

## 2022-02-15 PROCEDURE — 3725F SCREEN DEPRESSION PERFORMED: CPT | Performed by: INTERNAL MEDICINE

## 2022-02-15 RX ORDER — AZELASTINE HYDROCHLORIDE 137 UG/1
1 SPRAY, METERED NASAL 2 TIMES DAILY
Qty: 90 ML | Refills: 3 | Status: SHIPPED | OUTPATIENT
Start: 2022-02-15

## 2022-02-15 NOTE — ASSESSMENT & PLAN NOTE
BMI Counseling: Body mass index is 41 76 kg/m²  The BMI is above normal  Nutrition recommendations include decreasing overall calorie intake

## 2022-02-15 NOTE — ASSESSMENT & PLAN NOTE
Left post auricular that has not changed in size for years  It is not causing any symptoms  CT in 2017 showed small lymph nodes that appear benign   ENT freda in 2020 and no intervention recommended

## 2022-02-15 NOTE — PROGRESS NOTES
Assessment/Plan:    No problem-specific Assessment & Plan notes found for this encounter  Problem List Items Addressed This Visit        Endocrine    Impaired fasting glucose    Relevant Orders    Comprehensive metabolic panel    HEMOGLOBIN A1C W/ EAG ESTIMATION       Respiratory    JAZMYN (obstructive sleep apnea)       Other    Dyslipidemia    Relevant Orders    Comprehensive metabolic panel    Lipid Panel with Direct LDL reflex    Seasonal allergies - Primary    Relevant Medications    Azelastine HCl 137 MCG/SPRAY SOLN    Morbid obesity with BMI of 40 0-44 9, adult (HCC)      Other Visit Diagnoses     Thrombocytopenia (HCC)        Relevant Orders    CBC and Platelet    Screening PSA (prostate specific antigen)        Relevant Orders    PSA, Total Screen            Subjective:      Patient ID: Chad Vides is a 77 y o  male      HPI    The following portions of the patient's history were reviewed and updated as appropriate: allergies, current medications, past family history, past medical history, past social history, past surgical history and problem list     Review of Systems      Objective:      /82   Pulse 64   Resp 16   Ht 5' 9" (1 753 m)   Wt 128 kg (282 lb 12 8 oz)   SpO2 99%   BMI 41 76 kg/m²          Physical Exam

## 2022-02-15 NOTE — PROGRESS NOTES
[facility]    NAME: Shailesh Garcia  AGE: 77 y o  SEX: male  : 1955     DATE: 2/15/2022     Assessment and Plan:     Problem List Items Addressed This Visit        Endocrine    Impaired fasting glucose    Relevant Orders    Comprehensive metabolic panel    HEMOGLOBIN A1C W/ EAG ESTIMATION       Respiratory    JAZMYN (obstructive sleep apnea)     Compliant with biPAP therapy            Immune and Lymphatic    Cervical lymphadenopathy     Left post auricular that has not changed in size for years  It is not causing any symptoms  CT in  showed small lymph nodes that appear benign  ENT freda in  and no intervention recommended              Other    Dyslipidemia - Primary    Relevant Orders    Comprehensive metabolic panel    Lipid Panel with Direct LDL reflex    Seasonal allergies    Relevant Medications    Azelastine HCl 137 MCG/SPRAY SOLN    Morbid obesity with BMI of 40 0-44 9, adult ([facility])     BMI Counseling: Body mass index is 41 76 kg/m²  The BMI is above normal  Nutrition recommendations include decreasing overall calorie intake  Other Visit Diagnoses     Thrombocytopenia ([address] )        Relevant Orders    CBC and Platelet    Screening PSA (prostate specific antigen)        Relevant Orders    PSA, Total Screen    Annual physical exam              Immunizations and preventive care screenings were discussed with patient today  Appropriate education was printed on patient's after visit summary  Unsure if he has had the "second" pneumococcal vaccine  We will check with [facility]  Addendum- [facility] called and he received a pneumococcal vaccine Prevnar 13 in 2021, not since then  We'll administer Pneumovax 23 at his follow up  Counseling:  · Exercise: the importance of regular exercise/physical activity was discussed  Recommend exercise 3-5 times per week for at least 30 minutes     Routine EKG at next visit Return in about 6 months (around 8/15/2022)  Chief Complaint:     Chief Complaint   Patient presents with    Annual Exam      History of Present Illness:     Adult Annual Physical   Patient here for a comprehensive physical exam  The patient reports no problems  Diet and Physical Activity  · Diet/Nutrition: poor diet  · Exercise: no formal exercise  Depression Screening  PHQ-2/9 Depression Screening    Little interest or pleasure in doing things: 0 - not at all  Feeling down, depressed, or hopeless: 0 - not at all  PHQ-2 Score: 0  PHQ-2 Interpretation: Negative depression screen       General Health  · Sleep: sleeps well and using BiPAP   Health  · Symptoms include: urinary frequency and nocturia     Review of Systems:     Review of Systems   Constitutional: Negative for chills, fatigue, fever and unexpected weight change  HENT: Positive for rhinorrhea (controlled on Azelastin)  Negative for ear pain  Respiratory: Negative for cough and shortness of breath  Cardiovascular: Negative for chest pain, palpitations and leg swelling  Gastrointestinal: Negative for abdominal pain, blood in stool, constipation, diarrhea, nausea and vomiting  Genitourinary: Positive for frequency  Negative for difficulty urinating  Musculoskeletal: Positive for arthralgias  Neurological: Negative for dizziness and headaches        Past Medical History:     Past Medical History:   Diagnosis Date    CPAP (continuous positive airway pressure) dependence     Elevated serum creatinine     last assessed 01/16/2018    Morbid obesity ([address] ) 1/11/2016    Screening PSA (prostate specific antigen) 4/13/2018    Sleep apnea     Tinnitus, left     last assessed 08/11/2014      Past Surgical History:     Past Surgical History:   Procedure Laterality Date    COLONOSCOPY      UT COLONOSCOPY FLX DX W/COLLJ SPEC WHEN PFRMD N/A 6/27/2017    Procedure: COLONOSCOPY;  Surgeon: Chapincito Ambrosio MD;  Location: [facility];  Service: Colorectal    TN RECONSTR TOTAL SHOULDER IMPLANT Right 1/9/2018    Procedure: TOTAL SHOULDER ARTHROPLASTY;  Surgeon: Cynthia Melo MD;  Location: [facility];  Service: Orthopedics      Family History:     Family History   Problem Relation Age of Onset    Heart disease Mother     Diabetes Mother     Other Mother         cardiac disorder    Cancer Father     Diabetes Father       Social History:     Social History     Socioeconomic History    Marital status: /Civil Union     Spouse name: None    Number of children: None    Years of education: None    Highest education level: None   Occupational History    None   Tobacco Use    Smoking status: Never Smoker    Smokeless tobacco: Never Used   Substance and Sexual Activity    Alcohol use: Yes     Comment: social    Drug use: No    Sexual activity: None   Other Topics Concern    None   Social History Narrative    None     Social Determinants of Health     Financial Resource Strain: Not on file   Food Insecurity: Not on file   Transportation Needs: Not on file   Physical Activity: Not on file   Stress: Not on file   Social Connections: Not on file   Intimate Partner Violence: Not on file   Housing Stability: Not on file      Current Medications:     Current Outpatient Medications   Medication Sig Dispense Refill    atorvastatin (LIPITOR) 10 mg tablet Take 1 tablet (10 mg total) by mouth daily 90 tablet 3    Azelastine HCl 137 MCG/SPRAY SOLN 1 spray into each nostril 2 (two) times a day 90 mL 3    naproxen (NAPROSYN) 500 mg tablet Take 1 tablet (500 mg total) by mouth 2 (two) times a day as needed for moderate pain 90 tablet 0    tadalafil (Cialis) 20 MG tablet Take 1 tablet (20 mg total) by mouth as needed for erectile dysfunction Take 1 hour prior to activity 30 tablet 3     No current facility-administered medications for this visit  Allergies:      Allergies   Allergen Reactions    Seasonal Ic [Cholestatin] Itching Runny eyes and a cough sometimes      Physical Exam:     /82   Pulse 64   Resp 16   Ht 5' 9" (1 753 m)   Wt 128 kg (282 lb 12 8 oz)   SpO2 99%   BMI 41 76 kg/m²     Physical Exam  Vitals and nursing note reviewed  Constitutional:       General: He is not in acute distress  Appearance: He is well-developed  He is obese  He is not ill-appearing, toxic-appearing or diaphoretic  HENT:      Head: Normocephalic and atraumatic  Right Ear: External ear normal  There is no impacted cerumen  Left Ear: External ear normal  There is no impacted cerumen  Eyes:      Conjunctiva/sclera: Conjunctivae normal    Cardiovascular:      Rate and Rhythm: Normal rate and regular rhythm  Heart sounds: No murmur heard  Pulmonary:      Effort: Pulmonary effort is normal  No respiratory distress  Breath sounds: Normal breath sounds  Abdominal:      Palpations: Abdomen is soft  Tenderness: There is no abdominal tenderness  Musculoskeletal:      Cervical back: Neck supple  Right lower leg: No edema  Left lower leg: No edema  Lymphadenopathy:      Head:      Left side of head: Posterior auricular adenopathy present  Skin:     General: Skin is warm and dry  Neurological:      Mental Status: He is alert     Psychiatric:         Mood and Affect: Mood normal          Behavior: Behavior normal           Adriane Alex MD  [facility]

## 2022-02-15 NOTE — PATIENT INSTRUCTIONS

## 2022-02-28 ENCOUNTER — TELEPHONE (OUTPATIENT)
Dept: INTERNAL MEDICINE CLINIC | Facility: CLINIC | Age: 67
End: 2022-02-28

## 2022-02-28 NOTE — TELEPHONE ENCOUNTER
Patient left a message that he would like a referral for a hand doctor he is having right hand pain   Please Rebecca Gomez

## 2022-03-01 NOTE — TELEPHONE ENCOUNTER
Advised pt - verbalized understanding  He's had issues with right hand for a long time but its been worse for the last two days, it has been swollen

## 2022-03-07 ENCOUNTER — HOSPITAL ENCOUNTER (OUTPATIENT)
Dept: RADIOLOGY | Facility: HOSPITAL | Age: 67
Discharge: HOME | End: 2022-03-07
Payer: COMMERCIAL

## 2022-03-07 ENCOUNTER — OFFICE VISIT (OUTPATIENT)
Dept: OBGYN CLINIC | Facility: HOSPITAL | Age: 67
End: 2022-03-07
Payer: COMMERCIAL

## 2022-03-07 VITALS
HEART RATE: 70 BPM | HEIGHT: 69 IN | BODY MASS INDEX: 41.77 KG/M2 | SYSTOLIC BLOOD PRESSURE: 140 MMHG | WEIGHT: 282 LBS | DIASTOLIC BLOOD PRESSURE: 84 MMHG

## 2022-03-07 DIAGNOSIS — R60.9 DORSAL HAND HARD EDEMA, POST-TRAUMATIC: Primary | ICD-10-CM

## 2022-03-07 DIAGNOSIS — M79.641 RIGHT HAND PAIN: ICD-10-CM

## 2022-03-07 PROCEDURE — 73130 X-RAY EXAM OF HAND: CPT

## 2022-03-07 PROCEDURE — 3008F BODY MASS INDEX DOCD: CPT | Performed by: PHYSICIAN ASSISTANT

## 2022-03-07 PROCEDURE — 99203 OFFICE O/P NEW LOW 30 MIN: CPT | Performed by: PHYSICIAN ASSISTANT
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
## 2022-07-20 DIAGNOSIS — Z13.6 SCREENING FOR CARDIOVASCULAR CONDITION: Primary | ICD-10-CM

## 2022-08-03 ENCOUNTER — HOSPITAL ENCOUNTER (OUTPATIENT)
Dept: VASCULAR ULTRASOUND | Facility: HOSPITAL | Age: 67
Discharge: HOME | End: 2022-08-03
Payer: COMMERCIAL

## 2022-08-03 DIAGNOSIS — Z13.6 SCREENING FOR CARDIOVASCULAR CONDITION: ICD-10-CM

## 2022-08-03 PROCEDURE — 93922 UPR/L XTREMITY ART 2 LEVELS: CPT

## 2022-08-04 PROCEDURE — 93880 EXTRACRANIAL BILAT STUDY: CPT | Performed by: SURGERY

## 2022-08-04 PROCEDURE — 93922 UPR/L XTREMITY ART 2 LEVELS: CPT | Performed by: SURGERY

## 2022-08-04 PROCEDURE — 93978 VASCULAR STUDY: CPT | Performed by: SURGERY

## 2022-08-30 DIAGNOSIS — E78.5 DYSLIPIDEMIA: ICD-10-CM

## 2022-08-30 RX ORDER — ATORVASTATIN CALCIUM 10 MG/1
10 TABLET, FILM COATED ORAL DAILY
Qty: 90 TABLET | Refills: 3 | Status: SHIPPED | OUTPATIENT
Start: 2022-08-30

## 2022-08-30 NOTE — TELEPHONE ENCOUNTER
Medication Refill Request     Name atorvastatin   Dose/Frequency 10 mg daily   Quantity 90 days with 3 refills   Verified pharmacy   [x]  Verified ordering Provider   [x]  Does patient have enough for the next 3 days?  Yes [x] No []

## 2022-09-06 ENCOUNTER — APPOINTMENT (OUTPATIENT)
Dept: LAB | Facility: CLINIC | Age: 67
End: 2022-09-06
Payer: COMMERCIAL

## 2022-09-06 DIAGNOSIS — Z12.5 SCREENING PSA (PROSTATE SPECIFIC ANTIGEN): ICD-10-CM

## 2022-09-06 DIAGNOSIS — E78.5 DYSLIPIDEMIA: ICD-10-CM

## 2022-09-06 DIAGNOSIS — R73.01 IMPAIRED FASTING GLUCOSE: ICD-10-CM

## 2022-09-06 DIAGNOSIS — D69.6 THROMBOCYTOPENIA (HCC): ICD-10-CM

## 2022-09-06 LAB
ALBUMIN SERPL BCP-MCNC: 3.8 G/DL (ref 3.5–5)
ALP SERPL-CCNC: 46 U/L (ref 34–104)
ALT SERPL W P-5'-P-CCNC: 18 U/L (ref 7–52)
ANION GAP SERPL CALCULATED.3IONS-SCNC: 5 MMOL/L (ref 4–13)
AST SERPL W P-5'-P-CCNC: 13 U/L (ref 13–39)
BILIRUB SERPL-MCNC: 0.75 MG/DL (ref 0.2–1)
BUN SERPL-MCNC: 22 MG/DL (ref 5–25)
CALCIUM SERPL-MCNC: 9.1 MG/DL (ref 8.4–10.2)
CHLORIDE SERPL-SCNC: 108 MMOL/L (ref 96–108)
CHOLEST SERPL-MCNC: 134 MG/DL
CO2 SERPL-SCNC: 28 MMOL/L (ref 21–32)
CREAT SERPL-MCNC: 1.2 MG/DL (ref 0.6–1.3)
ERYTHROCYTE [DISTWIDTH] IN BLOOD BY AUTOMATED COUNT: 12.8 % (ref 11.6–15.1)
EST. AVERAGE GLUCOSE BLD GHB EST-MCNC: 105 MG/DL
GFR SERPL CREATININE-BSD FRML MDRD: 62 ML/MIN/1.73SQ M
GLUCOSE P FAST SERPL-MCNC: 108 MG/DL (ref 65–99)
HBA1C MFR BLD: 5.3 %
HCT VFR BLD AUTO: 40.5 % (ref 36.5–49.3)
HDLC SERPL-MCNC: 35 MG/DL
HGB BLD-MCNC: 14.7 G/DL (ref 12–17)
LDLC SERPL CALC-MCNC: 76 MG/DL (ref 0–100)
MCH RBC QN AUTO: 31.9 PG (ref 26.8–34.3)
MCHC RBC AUTO-ENTMCNC: 36.3 G/DL (ref 31.4–37.4)
MCV RBC AUTO: 88 FL (ref 82–98)
PLATELET # BLD AUTO: 130 THOUSANDS/UL (ref 149–390)
PMV BLD AUTO: 11.9 FL (ref 8.9–12.7)
POTASSIUM SERPL-SCNC: 4 MMOL/L (ref 3.5–5.3)
PROT SERPL-MCNC: 6.7 G/DL (ref 6.4–8.4)
PSA SERPL-MCNC: 0.6 NG/ML (ref 0–4)
RBC # BLD AUTO: 4.61 MILLION/UL (ref 3.88–5.62)
SODIUM SERPL-SCNC: 141 MMOL/L (ref 135–147)
TRIGL SERPL-MCNC: 116 MG/DL
WBC # BLD AUTO: 6.65 THOUSAND/UL (ref 4.31–10.16)

## 2022-09-06 PROCEDURE — 80061 LIPID PANEL: CPT

## 2022-09-06 PROCEDURE — 36415 COLL VENOUS BLD VENIPUNCTURE: CPT

## 2022-09-06 PROCEDURE — 80053 COMPREHEN METABOLIC PANEL: CPT

## 2022-09-06 PROCEDURE — 85027 COMPLETE CBC AUTOMATED: CPT

## 2022-09-06 PROCEDURE — G0103 PSA SCREENING: HCPCS

## 2022-09-06 PROCEDURE — 83036 HEMOGLOBIN GLYCOSYLATED A1C: CPT

## 2022-09-09 ENCOUNTER — OFFICE VISIT (OUTPATIENT)
Dept: INTERNAL MEDICINE CLINIC | Facility: CLINIC | Age: 67
End: 2022-09-09
Payer: COMMERCIAL

## 2022-09-09 VITALS
OXYGEN SATURATION: 97 % | SYSTOLIC BLOOD PRESSURE: 142 MMHG | DIASTOLIC BLOOD PRESSURE: 82 MMHG | HEART RATE: 82 BPM | HEIGHT: 68 IN | WEIGHT: 287 LBS | BODY MASS INDEX: 43.5 KG/M2

## 2022-09-09 DIAGNOSIS — M25.511 PAIN IN JOINT OF RIGHT SHOULDER: ICD-10-CM

## 2022-09-09 DIAGNOSIS — J30.2 SEASONAL ALLERGIES: ICD-10-CM

## 2022-09-09 DIAGNOSIS — Z23 ENCOUNTER FOR IMMUNIZATION: ICD-10-CM

## 2022-09-09 DIAGNOSIS — L72.3 SEBACEOUS CYST: ICD-10-CM

## 2022-09-09 DIAGNOSIS — R73.01 IMPAIRED FASTING GLUCOSE: ICD-10-CM

## 2022-09-09 DIAGNOSIS — N52.9 ERECTILE DYSFUNCTION, UNSPECIFIED ERECTILE DYSFUNCTION TYPE: ICD-10-CM

## 2022-09-09 DIAGNOSIS — E78.5 DYSLIPIDEMIA: Primary | ICD-10-CM

## 2022-09-09 DIAGNOSIS — E66.01 MORBID OBESITY WITH BMI OF 40.0-44.9, ADULT (HCC): ICD-10-CM

## 2022-09-09 DIAGNOSIS — G89.29 CHRONIC BILATERAL LOW BACK PAIN WITHOUT SCIATICA: ICD-10-CM

## 2022-09-09 DIAGNOSIS — M54.50 CHRONIC BILATERAL LOW BACK PAIN WITHOUT SCIATICA: ICD-10-CM

## 2022-09-09 PROCEDURE — 93000 ELECTROCARDIOGRAM COMPLETE: CPT | Performed by: INTERNAL MEDICINE

## 2022-09-09 PROCEDURE — 99214 OFFICE O/P EST MOD 30 MIN: CPT | Performed by: INTERNAL MEDICINE

## 2022-09-09 PROCEDURE — 1160F RVW MEDS BY RX/DR IN RCRD: CPT | Performed by: INTERNAL MEDICINE

## 2022-09-09 PROCEDURE — 90471 IMMUNIZATION ADMIN: CPT

## 2022-09-09 PROCEDURE — 90677 PCV20 VACCINE IM: CPT

## 2022-09-09 RX ORDER — NAPROXEN 500 MG/1
500 TABLET ORAL 2 TIMES DAILY PRN
Qty: 90 TABLET | Refills: 3 | Status: SHIPPED | OUTPATIENT
Start: 2022-09-09

## 2022-09-09 RX ORDER — TADALAFIL 20 MG/1
20 TABLET ORAL DAILY PRN
Qty: 30 TABLET | Refills: 3 | Status: SHIPPED | OUTPATIENT
Start: 2022-09-09

## 2022-09-09 RX ORDER — AZELASTINE HYDROCHLORIDE 137 UG/1
1 SPRAY, METERED NASAL 2 TIMES DAILY
Qty: 90 ML | Refills: 3 | Status: SHIPPED | OUTPATIENT
Start: 2022-09-09

## 2022-09-09 NOTE — PROGRESS NOTES
Assessment/Plan:  Continue current medications  BP in office elevated but home readings normal  Observe sebaceous cyst at this time  BMI Counseling: Body mass index is 43 15 kg/m²  The BMI is above normal  Nutrition recommendations include decreasing overall calorie intake  Problem List Items Addressed This Visit        Endocrine    Impaired fasting glucose    Relevant Orders    CBC and differential    Comprehensive metabolic panel    HEMOGLOBIN A1C W/ EAG ESTIMATION       Other    Erectile dysfunction    Relevant Medications    tadalafil (Cialis) 20 MG tablet    Dyslipidemia - Primary    Relevant Orders    POCT ECG (Completed)    CBC and differential    Comprehensive metabolic panel    Lipid Panel with Direct LDL reflex    Seasonal allergies    Relevant Medications    Azelastine HCl 137 MCG/SPRAY SOLN    Morbid obesity with BMI of 40 0-44 9, adult (HCC)      Other Visit Diagnoses     Encounter for immunization        Relevant Orders    Pneumococcal Conjugate Vaccine 20-valent (PCV20) (Completed)    Pain in joint of right shoulder        Relevant Medications    naproxen (NAPROSYN) 500 mg tablet    Chronic bilateral low back pain without sciatica        Relevant Medications    naproxen (NAPROSYN) 500 mg tablet    Sebaceous cyst                Subjective:      Patient ID: Wanda Peng is a 77 y o  male  HPI  Here for a follow up  +weight gain from stress eating  Stress from work and planning to retire in the next 7months  Recent labs reviewed and lipids well controlled on atorvastatin  Normal A1C  BP at home <120/<80    The following portions of the patient's history were reviewed and updated as appropriate: allergies, current medications, past family history, past medical history, past social history, past surgical history and problem list     Review of Systems   Constitutional: Positive for unexpected weight change (weight gain)  Respiratory: Negative for shortness of breath      Cardiovascular: Negative for chest pain and palpitations  Gastrointestinal: Negative for abdominal pain, constipation and diarrhea  Musculoskeletal: Positive for arthralgias  Skin:        sebaceous cyst on the back has returned, previous  y removed         Objective:      /82   Pulse 82   Ht 5' 8 39" (1 737 m)   Wt 130 kg (287 lb)   SpO2 97%   BMI 43 15 kg/m²          Physical Exam  Constitutional:       General: He is not in acute distress  Appearance: He is well-developed  He is obese  He is not ill-appearing, toxic-appearing or diaphoretic  HENT:      Head: Normocephalic and atraumatic  Right Ear: External ear normal       Left Ear: External ear normal    Eyes:      Conjunctiva/sclera: Conjunctivae normal    Cardiovascular:      Rate and Rhythm: Normal rate and regular rhythm  Heart sounds: Normal heart sounds  No murmur heard  Pulmonary:      Effort: Pulmonary effort is normal  No respiratory distress  Breath sounds: Normal breath sounds  No wheezing or rales  Abdominal:      General: There is no distension  Palpations: Abdomen is soft  There is no mass  Tenderness: There is no abdominal tenderness  There is no guarding or rebound  Musculoskeletal:      Cervical back: Neck supple  Right lower leg: No edema  Left lower leg: No edema  Lymphadenopathy:      Cervical: Cervical adenopathy present  Left cervical: Posterior cervical adenopathy present  Skin:     Findings: Lesion (sebaceous cyst in the center of the back, no redness) present  Neurological:      Mental Status: He is alert and oriented to person, place, and time  Psychiatric:         Mood and Affect: Mood normal          Behavior: Behavior normal          Thought Content:  Thought content normal          Judgment: Judgment normal

## 2022-10-06 DIAGNOSIS — H00.015 HORDEOLUM EXTERNUM OF LEFT LOWER EYELID: Primary | ICD-10-CM

## 2023-01-09 ENCOUNTER — OFFICE VISIT (OUTPATIENT)
Dept: SLEEP CENTER | Facility: CLINIC | Age: 68
End: 2023-01-09

## 2023-01-09 VITALS
BODY MASS INDEX: 45.16 KG/M2 | DIASTOLIC BLOOD PRESSURE: 80 MMHG | HEIGHT: 68 IN | OXYGEN SATURATION: 93 % | WEIGHT: 298 LBS | HEART RATE: 64 BPM | SYSTOLIC BLOOD PRESSURE: 132 MMHG

## 2023-01-09 DIAGNOSIS — G47.33 OSA (OBSTRUCTIVE SLEEP APNEA): Primary | ICD-10-CM

## 2023-01-09 DIAGNOSIS — R68.2 DRY MOUTH: ICD-10-CM

## 2023-01-09 DIAGNOSIS — Z56.6 STRESS AT WORK: ICD-10-CM

## 2023-01-09 DIAGNOSIS — F51.12 INSUFFICIENT SLEEP SYNDROME: ICD-10-CM

## 2023-01-09 DIAGNOSIS — E66.01 MORBID OBESITY WITH BMI OF 40.0-44.9, ADULT (HCC): ICD-10-CM

## 2023-01-09 DIAGNOSIS — J30.2 SEASONAL ALLERGIES: ICD-10-CM

## 2023-01-09 SDOH — HEALTH STABILITY - MENTAL HEALTH: OTHER PHYSICAL AND MENTAL STRAIN RELATED TO WORK: Z56.6

## 2023-01-09 NOTE — PROGRESS NOTES
Follow-Up Note - Sleep Center   [facility]  79 y o  male  PWE:[birth year]  FKM:[id number]  DOS:1/9/2023    CC: I saw this patient for follow-up in clinic today for Sleep disordered breathing, Coexisting Sleep and Medical Problems  Results of prior studies in 2015: The diagnostic study demonstrated AHI of 69 per hour  Minimum oxygen saturation was 70% any spent is 96 8% of time asleep with saturations below 90%  During the subsequent therapeutic study, sleep disordered breathing was adequately remediated with BiPAP at 19/14 cm H2O  PFSH, Problem List, Medications & Allergies were reviewed in EMR  Interval changes: None reported  He  has a past medical history of CPAP (continuous positive airway pressure) dependence, Elevated serum creatinine, Morbid obesity ([facility] [address] ) (1/11/2016), Screening PSA (prostate specific antigen) (4/13/2018), Sleep apnea, and Tinnitus, left  He has a current medication list which includes the following prescription(s): atorvastatin, azelastine hcl, naproxen, and tadalafil  PHYSIOLOGICAL DATA REVIEW AND INTERPRETATION: Using PAP > 4 hours/night 77%  He is using the machine nightly but average on days used is 5 hours and 10 minutes estimated SHUN 0 4/hour with pressure of 20/10cm H2O; Patient has not been using ozone based devices to sanitize the machine  SUBJECTIVE: Regarding use of PAP, Radha Bang reports:   · some adverse effects: mask leaks  and mask discomfort; has not noticed any fibres or foreign material in air line  · He is benefiting from use: sleeping better   Sleep Routine: Radha Bang reports getting 6 hrs sleep; he has no difficulty initiating or maintaining sleep   He arises spontaneously and usually feels refreshed  Radha Bang denies excessive daytime sleepiness,  He rated [himself] at Total score: 3 /24 on the [place] Sleepiness Scale  Habits:[ reports that he has never smoked   He has never used smokeless tobacco ], [ reports current alcohol use ], [ reports no history of drug use ], Caffeine use:limited; Exercise routine: "not enough"  ROS: reviewed & significant for some weight gain     Allergies are controlled  He reported no respiratory or cardiac symptoms  He has work-related stress causing racing thoughts that can affect sleep  Cem Simple EXAM: /80   Pulse 64   Ht 5' 8 39" (1 737 m)   Wt 135 kg (298 lb)   SpO2 93%   BMI 44 80 kg/m²     Wt Readings from Last 3 Encounters:   01/09/23 135 kg (298 lb)   09/09/22 130 kg (287 lb)   03/07/22 128 kg (282 lb)      Patient is well groomed; well appearing  CNS: Alert, orientated, clear & coherent speech  Psych: cooperative and in no distress  Mental state: Appears normal   H&N: EOMI; NC/AT: No facial pressure marks, no rashes  Skin/Extrem: col & hydration normal; no edema  Resp: Respiratory effort is normal  Physical findings otherwise essentially unchanged from previous  IMPRESSION: Problem List Items & Comorbidities Addressed this Visit    1  JAZMYN (obstructive sleep apnea)  PAP DME Resupply/Reorder      2  Insufficient sleep syndrome        3  Dry mouth        4  Seasonal allergies        5  Stress at work        6  Morbid obesity with BMI of 40 0-44 9, adult ([facility] [address] )            PLAN:  1  I reviewed results of prior studies and physiologic data with the patient  2  I discussed treatment options with risks and benefits  3  Treatment with  PAP is medically necessary and Nancy Llanos is agreable to continue use  4  Care of equipment, methods to improve comfort using PAP and importance of compliance with therapy were discussed  5  Pressure setting: continue 20/10 cmH2O     6  Rx provided to replace supplies and Care coordinated with DME provider  7  Discussed strategies for stress and weight reduction  8  Also advised allowing sufficient opportunity for sleep  9  Follow-up is advised in 1 year or sooner if needed to monitor progress, compliance and to adjust therapy       Thank you for allowing me to participate in the care of this patient  Sincerely,     Authenticated electronically on 71/16/33   Board Certified Specialist     Portions of the record may have been created with voice recognition software  Occasional wrong word or "sound a like" substitutions may have occurred due to the inherent limitations of voice recognition software  There may also be notations and random deletions of words or characters from malfunctioning software  Read the chart carefully and recognize, using context, where substitutions/deletions have occurred

## 2023-01-09 NOTE — PATIENT INSTRUCTIONS

## 2023-01-10 ENCOUNTER — TELEPHONE (OUTPATIENT)
Dept: SLEEP CENTER | Facility: CLINIC | Age: 68
End: 2023-01-10

## 2023-01-11 LAB

## 2023-03-10 ENCOUNTER — APPOINTMENT (OUTPATIENT)
Dept: LAB | Facility: CLINIC | Age: 68
End: 2023-03-10

## 2023-03-10 DIAGNOSIS — R73.01 IMPAIRED FASTING GLUCOSE: ICD-10-CM

## 2023-03-10 DIAGNOSIS — E78.5 DYSLIPIDEMIA: ICD-10-CM

## 2023-03-10 LAB
ALBUMIN SERPL BCP-MCNC: 4.1 G/DL (ref 3.5–5)
ALP SERPL-CCNC: 49 U/L (ref 34–104)
ALT SERPL W P-5'-P-CCNC: 16 U/L (ref 7–52)
ANION GAP SERPL CALCULATED.3IONS-SCNC: 6 MMOL/L (ref 4–13)
AST SERPL W P-5'-P-CCNC: 14 U/L (ref 13–39)
BASOPHILS # BLD AUTO: 0.04 THOUSANDS/ÂΜL (ref 0–0.1)
BASOPHILS NFR BLD AUTO: 1 % (ref 0–1)
BILIRUB SERPL-MCNC: 0.96 MG/DL (ref 0.2–1)
BUN SERPL-MCNC: 22 MG/DL (ref 5–25)
CALCIUM SERPL-MCNC: 9.3 MG/DL (ref 8.4–10.2)
CHLORIDE SERPL-SCNC: 104 MMOL/L (ref 96–108)
CHOLEST SERPL-MCNC: 151 MG/DL
CO2 SERPL-SCNC: 29 MMOL/L (ref 21–32)
CREAT SERPL-MCNC: 1.24 MG/DL (ref 0.6–1.3)
EOSINOPHIL # BLD AUTO: 0.2 THOUSAND/ÂΜL (ref 0–0.61)
EOSINOPHIL NFR BLD AUTO: 3 % (ref 0–6)
ERYTHROCYTE [DISTWIDTH] IN BLOOD BY AUTOMATED COUNT: 12.6 % (ref 11.6–15.1)
EST. AVERAGE GLUCOSE BLD GHB EST-MCNC: 100 MG/DL
GFR SERPL CREATININE-BSD FRML MDRD: 59 ML/MIN/1.73SQ M
GLUCOSE P FAST SERPL-MCNC: 105 MG/DL (ref 65–99)
HBA1C MFR BLD: 5.1 %
HCT VFR BLD AUTO: 43.9 % (ref 36.5–49.3)
HDLC SERPL-MCNC: 35 MG/DL
HGB BLD-MCNC: 15.3 G/DL (ref 12–17)
IMM GRANULOCYTES # BLD AUTO: 0.03 THOUSAND/UL (ref 0–0.2)
IMM GRANULOCYTES NFR BLD AUTO: 0 % (ref 0–2)
LDLC SERPL CALC-MCNC: 89 MG/DL (ref 0–100)
LYMPHOCYTES # BLD AUTO: 2.42 THOUSANDS/ÂΜL (ref 0.6–4.47)
LYMPHOCYTES NFR BLD AUTO: 36 % (ref 14–44)
MCH RBC QN AUTO: 31.6 PG (ref 26.8–34.3)
MCHC RBC AUTO-ENTMCNC: 34.9 G/DL (ref 31.4–37.4)
MCV RBC AUTO: 91 FL (ref 82–98)
MONOCYTES # BLD AUTO: 0.54 THOUSAND/ÂΜL (ref 0.17–1.22)
MONOCYTES NFR BLD AUTO: 8 % (ref 4–12)
NEUTROPHILS # BLD AUTO: 3.44 THOUSANDS/ÂΜL (ref 1.85–7.62)
NEUTS SEG NFR BLD AUTO: 52 % (ref 43–75)
NRBC BLD AUTO-RTO: 0 /100 WBCS
PLATELET # BLD AUTO: 149 THOUSANDS/UL (ref 149–390)
PMV BLD AUTO: 12.1 FL (ref 8.9–12.7)
POTASSIUM SERPL-SCNC: 3.9 MMOL/L (ref 3.5–5.3)
PROT SERPL-MCNC: 7.1 G/DL (ref 6.4–8.4)
RBC # BLD AUTO: 4.84 MILLION/UL (ref 3.88–5.62)
SODIUM SERPL-SCNC: 139 MMOL/L (ref 135–147)
TRIGL SERPL-MCNC: 133 MG/DL
WBC # BLD AUTO: 6.67 THOUSAND/UL (ref 4.31–10.16)

## 2023-03-16 ENCOUNTER — OFFICE VISIT (OUTPATIENT)
Dept: INTERNAL MEDICINE CLINIC | Facility: CLINIC | Age: 68
End: 2023-03-16

## 2023-03-16 VITALS
OXYGEN SATURATION: 95 % | DIASTOLIC BLOOD PRESSURE: 82 MMHG | HEART RATE: 75 BPM | WEIGHT: 287.6 LBS | BODY MASS INDEX: 42.6 KG/M2 | HEIGHT: 69 IN | SYSTOLIC BLOOD PRESSURE: 132 MMHG

## 2023-03-16 DIAGNOSIS — R73.01 IMPAIRED FASTING GLUCOSE: ICD-10-CM

## 2023-03-16 DIAGNOSIS — Z12.5 SCREENING PSA (PROSTATE SPECIFIC ANTIGEN): ICD-10-CM

## 2023-03-16 DIAGNOSIS — E66.01 MORBID OBESITY WITH BMI OF 40.0-44.9, ADULT (HCC): ICD-10-CM

## 2023-03-16 DIAGNOSIS — Z00.00 ANNUAL PHYSICAL EXAM: Primary | ICD-10-CM

## 2023-03-16 DIAGNOSIS — E78.5 DYSLIPIDEMIA: ICD-10-CM

## 2023-03-16 NOTE — PROGRESS NOTES
[facility], [place] [facility]    NAME: Esperanza Andrews  AGE: 79 y o  SEX: male  : 1955     DATE: 3/16/2023     Assessment and Plan:     Problem List Items Addressed This Visit        Endocrine    Impaired fasting glucose    Relevant Orders    CBC and differential    Comprehensive metabolic panel    HEMOGLOBIN A1C W/ EAG ESTIMATION       Other    Dyslipidemia    Relevant Orders    CBC and differential    Comprehensive metabolic panel    Lipid Panel with Direct LDL reflex    Morbid obesity with BMI of 40 0-44 9, adult (HCC)    Relevant Orders    CBC and differential    Comprehensive metabolic panel    HEMOGLOBIN A1C W/ EAG ESTIMATION    Lipid Panel with Direct LDL reflex   Other Visit Diagnoses     Annual physical exam    -  Primary    Screening PSA (prostate specific antigen)        Relevant Orders    PSA, Total Screen          Immunizations and preventive care screenings were discussed with patient today  Appropriate education was printed on patient's after visit summary  Discussed risks and benefits of prostate cancer screening  We discussed the controversial history of PSA screening for prostate cancer in the [place] as well as the risk of over detection and over treatment of prostate cancer by way of PSA screening  The patient understands that PSA blood testing is an imperfect way to screen for prostate cancer and that elevated PSA levels in the blood may also be caused by infection, inflammation, prostatic trauma or manipulation, urological procedures, or by benign prostatic enlargement  The role of the digital rectal examination in prostate cancer screening was also discussed and I discussed with him that there is large interobserver variability in the findings of digital rectal examination  Counseling:  · Exercise: the importance of regular exercise/physical activity was discussed   Recommend exercise 3-5 times per week for at least 30 minutes  Return in about 6 months (around 9/16/2023)  Chief Complaint:     Chief Complaint   Patient presents with   • Physical Exam      History of Present Illness:     Adult Annual Physical   Patient here for a comprehensive physical exam  The patient reports no problems  Diet and Physical Activity  · Diet/Nutrition: well balanced diet and started following Noom 2 weeks ago  · Exercise: walking  Depression Screening  PHQ-2/9 Depression Screening    Little interest or pleasure in doing things: 0 - not at all  Feeling down, depressed, or hopeless: 0 - not at all  PHQ-2 Score: 0  PHQ-2 Interpretation: Negative depression screen       General Health  · Sleep: uses bipap 6h on average  · Hearing: normal - bilateral   · Vision: most recent eye exam >1 year ago  · Dental: regular dental visits   Health  · Symptoms include: erectile dysfunction, urinary frequency and urinary retention     Review of Systems:     Review of Systems   Respiratory: Negative for shortness of breath  Cardiovascular: Negative for chest pain and palpitations  Gastrointestinal: Negative for abdominal pain, constipation and diarrhea  Genitourinary: Positive for frequency  Negative for difficulty urinating  Musculoskeletal: Positive for arthralgias        Past Medical History:     Past Medical History:   Diagnosis Date   • Allergic 2015   • Anxiety June 2022   • CPAP (continuous positive airway pressure) dependence    • Eating disorder adulthood    Stress   • Elevated serum creatinine     last assessed 01/16/2018   • Morbid obesity ([facility] [address] ) 01/11/2016   • Obesity    • Screening PSA (prostate specific antigen) 04/13/2018   • Sleep apnea    • Tinnitus, left     last assessed 08/11/2014      Past Surgical History:     Past Surgical History:   Procedure Laterality Date   • COLONOSCOPY     • JOINT REPLACEMENT  01/09/2018   • CA ARTHROPLASTY GLENOHUMERAL JOINT TOTAL SHOULDER Right 01/09/2018    Procedure: TOTAL SHOULDER ARTHROPLASTY;  Surgeon: Cynthia Garica MD;  [facility];  Service: Orthopedics   • CO COLONOSCOPY FLX DX W/COLLJ [facility] INPATIENT REHABILITATION WHEN PFRMD N/A 06/27/2017    Procedure: COLONOSCOPY;  Surgeon: Toma Feliciano MD;  Location: [facility];   Service: Colorectal      Family History:     Family History   Problem Relation Age of Onset   • Heart disease Mother         Heavy smoker   • Diabetes Mother    • Other Mother         cardiac disorder   • Cancer Father         heavy smoker   • Diabetes Father       Social History:     Social History     Socioeconomic History   • Marital status: /Civil Union     Spouse name: None   • Number of children: None   • Years of education: None   • Highest education level: None   Occupational History   • None   Tobacco Use   • Smoking status: Never   • Smokeless tobacco: Never   Vaping Use   • Vaping Use: Never used   Substance and Sexual Activity   • Alcohol use: Yes     Comment: Only during family gatherings   • Drug use: No   • Sexual activity: Yes     Partners: Female     Birth control/protection: Post-menopausal   Other Topics Concern   • None   Social History Narrative   • None     Social Determinants of Health     Financial Resource Strain: Not on file   Food Insecurity: Not on file   Transportation Needs: Not on file   Physical Activity: Not on file   Stress: Not on file   Social Connections: Not on file   Intimate Partner Violence: Not on file   Housing Stability: Not on file      Current Medications:     Current Outpatient Medications   Medication Sig Dispense Refill   • atorvastatin (LIPITOR) 10 mg tablet Take 1 tablet (10 mg total) by mouth daily 90 tablet 3   • Azelastine HCl 137 MCG/SPRAY SOLN 1 spray into each nostril 2 (two) times a day 90 mL 3   • naproxen (NAPROSYN) 500 mg tablet Take 1 tablet (500 mg total) by mouth 2 (two) times a day as needed for moderate pain 90 tablet 3   • tadalafil (Cialis) 20 MG tablet Take 1 tablet (20 mg total) by mouth daily as needed for erectile dysfunction Take 1 hour prior to activity 30 tablet 3     No current facility-administered medications for this visit  Allergies: Allergies   Allergen Reactions   • Seasonal Ic [Cholestatin] Itching     Runny eyes and a cough sometimes      Physical Exam:     /82   Pulse 75   Ht 5' 8 82" (1 748 m)   Wt 130 kg (287 lb 9 6 oz)   SpO2 95%   BMI 42 69 kg/m²     Physical Exam  Vitals and nursing note reviewed  Constitutional:       General: He is not in acute distress  Appearance: He is well-developed  He is obese  He is not ill-appearing, toxic-appearing or diaphoretic  HENT:      Head: Normocephalic and atraumatic  Right Ear: External ear normal  There is no impacted cerumen  Left Ear: External ear normal  There is no impacted cerumen  Eyes:      Conjunctiva/sclera: Conjunctivae normal    Cardiovascular:      Rate and Rhythm: Normal rate and regular rhythm  Heart sounds: No murmur heard  Pulmonary:      Effort: Pulmonary effort is normal  No respiratory distress  Breath sounds: Normal breath sounds  Abdominal:      Palpations: Abdomen is soft  Tenderness: There is no abdominal tenderness  Musculoskeletal:      Cervical back: Neck supple  Lymphadenopathy:      Head:      Left side of head: Submandibular adenopathy present  Neurological:      Mental Status: He is alert     Psychiatric:         Mood and Affect: Mood normal          Behavior: Behavior normal           Natalia Cao MD  [facility] [place] [facility]

## 2023-05-12 NOTE — PROGRESS NOTES
Review of Systems      Genitourinary none   Cardiology none   Gastrointestinal none   Neurology none   Constitutional none   Integumentary none   Psychiatry none   Musculoskeletal none   Pulmonary snoring   ENT none   Endocrine none   Hematological none 0

## 2023-05-25 ENCOUNTER — TELEPHONE (OUTPATIENT)
Dept: OTHER | Facility: OTHER | Age: 68
End: 2023-05-25

## 2023-05-25 DIAGNOSIS — L72.3 SEBACEOUS CYST: Primary | ICD-10-CM

## 2023-05-25 NOTE — TELEPHONE ENCOUNTER
Pt called in stating he needs a referral to see a surgoen to remove a clogged duct on his back that’s creating swelling  He's requesting a call back at [phone]

## 2023-06-12 ENCOUNTER — PREP FOR PROCEDURE (OUTPATIENT)
Dept: SURGERY | Facility: CLINIC | Age: 68
End: 2023-06-12

## 2023-06-12 ENCOUNTER — CONSULT (OUTPATIENT)
Dept: SURGERY | Facility: CLINIC | Age: 68
End: 2023-06-12
Payer: COMMERCIAL

## 2023-06-12 VITALS — WEIGHT: 260 LBS | HEIGHT: 69 IN | BODY MASS INDEX: 38.51 KG/M2

## 2023-06-12 DIAGNOSIS — L72.3 SEBACEOUS CYST: Primary | ICD-10-CM

## 2023-06-12 DIAGNOSIS — L72.3 SEBACEOUS CYST: ICD-10-CM

## 2023-06-12 PROCEDURE — 99203 OFFICE O/P NEW LOW 30 MIN: CPT | Performed by: SURGERY

## 2023-06-12 NOTE — PROGRESS NOTES
Assessment/Plan: Patient presents with a sebaceous cyst over the mid central back  Is over the spinal processes  Desires to undergo excision  Examination reveals a 2 5 cm sebaceous cyst in the mid back  Excision would require undermining the tissues in order to affect primary repair  I suggested that we do this in the operating room venue under local anesthesia  He is agreeable  Risks and benefits explained  Diagnoses and all orders for this visit:    Sebaceous cyst  -     Ambulatory Referral to General Surgery        Subjective:      Patient ID: Elyse Donis is a 79 y o  male  Patient presents for evaluation of a back cyst       The following portions of the patient's history were reviewed and updated as appropriate:     He  has a past medical history of Allergic (2015), Anxiety (June 2022), CPAP (continuous positive airway pressure) dependence, Eating disorder (adulthood), Elevated serum creatinine, Morbid obesity ([facility] [address] ) (01/11/2016), Obesity, Screening PSA (prostate specific antigen) (04/13/2018), Sleep apnea, and Tinnitus, left  He  has a past surgical history that includes pr colonoscopy flx dx w/collj spec when pfrmd (N/A, 06/27/2017); Colonoscopy; pr arthroplasty glenohumeral joint total shoulder (Right, 01/09/2018); and Joint replacement (01/09/2018)  His family history includes Cancer in his father; Diabetes in his father and mother; Heart disease in his mother; Other in his mother  He  reports that he has never smoked  He has never used smokeless tobacco  He reports current alcohol use  He reports that he does not use drugs    Current Outpatient Medications   Medication Sig Dispense Refill   • atorvastatin (LIPITOR) 10 mg tablet Take 1 tablet (10 mg total) by mouth daily 90 tablet 3   • Azelastine HCl 137 MCG/SPRAY SOLN 1 spray into each nostril 2 (two) times a day 90 mL 3   • naproxen (NAPROSYN) 500 mg tablet Take 1 tablet (500 mg total) by mouth 2 (two) times a day as needed for "moderate pain 90 tablet 3   • tadalafil (Cialis) 20 MG tablet Take 1 tablet (20 mg total) by mouth daily as needed for erectile dysfunction Take 1 hour prior to activity 30 tablet 3     No current facility-administered medications for this visit  He is allergic to seasonal ic [cholestatin]       Review of Systems   Constitutional: Negative  Negative for activity change  HENT: Negative  Eyes: Negative  Respiratory: Negative  Cardiovascular: Negative  Gastrointestinal: Negative  Endocrine: Negative  Genitourinary: Negative  Musculoskeletal: Negative  Skin: Negative  Allergic/Immunologic: Negative  Neurological: Negative  Psychiatric/Behavioral: Negative for agitation, behavioral problems and confusion  The patient is not nervous/anxious  Objective:      Ht 5' 9\" (1 753 m)   Wt 118 kg (260 lb)   BMI 38 40 kg/m²          Physical Exam  Constitutional:       Appearance: He is well-developed  He is not diaphoretic  HENT:      Head: Normocephalic and atraumatic  Eyes:      General: No scleral icterus  Right eye: No discharge  Left eye: No discharge  Extraocular Movements: Extraocular movements intact  Conjunctiva/sclera: Conjunctivae normal    Neck:      Thyroid: No thyromegaly  Trachea: No tracheal deviation  Cardiovascular:      Rate and Rhythm: Normal rate and regular rhythm  Heart sounds: Normal heart sounds  No murmur heard  No friction rub  Pulmonary:      Effort: Pulmonary effort is normal  No respiratory distress  Breath sounds: No stridor  No wheezing  Chest:      Chest wall: No tenderness  Abdominal:      General: There is no distension  Palpations: There is no mass  Tenderness: There is no abdominal tenderness  There is no guarding or rebound  Musculoskeletal:         General: No tenderness  Right lower leg: No edema  Left lower leg: No edema     Lymphadenopathy:      Cervical: No " cervical adenopathy  Skin:     General: Skin is warm and dry  Findings: No erythema or rash  Comments: 2 5 cm sebaceous cyst in the mid central back  Neurological:      Mental Status: He is alert and oriented to person, place, and time  Cranial Nerves: No cranial nerve deficit        Coordination: Coordination normal    Psychiatric:         Behavior: Behavior normal          Judgment: Judgment normal

## 2023-06-21 NOTE — DISCHARGE INSTR - AVS FIRST PAGE
Please call the office when you leave to schedule an appointment for 3 weeks. Please call [phone]. [address], [address], [facility] [place], [address]. Off of [address] between [facility] and [facility] [place]. Activity:    As tolerated    Return to Work:   Okay to return to work when you feel well if you desire. Diet:   You may return to your normal healthy diet. Wound Care: Your wound is closed with dissolvable stitches and glue. It is okay to shower. Wash incision gently with soap and water and pat dry. Do not soak incisions in bath water or swim for two weeks. Do not apply any creams or ointments. Pain Medication:   Please take as directed if needed. May use Advil or Motrin in addition. Other: It is normal to developed a “healing ridge” / firm incision after surgery. This is your body making scar tissue. It is a good sign  It is normal to get bruising after surgery. If you have questions after discharge please call the office.     If you have increased pain, fever >101.5, increased drainage, redness or a bad smell at your surgery site, please call us immediately or come directly to the Emergency Room

## 2023-07-19 ENCOUNTER — HOSPITAL ENCOUNTER (OUTPATIENT)
Facility: HOSPITAL | Age: 68
Setting detail: OUTPATIENT SURGERY
Discharge: HOME | End: 2023-07-19
Attending: SURGERY | Admitting: SURGERY
Payer: COMMERCIAL

## 2023-07-19 VITALS
DIASTOLIC BLOOD PRESSURE: 72 MMHG | OXYGEN SATURATION: 97 % | SYSTOLIC BLOOD PRESSURE: 137 MMHG | HEART RATE: 64 BPM | HEIGHT: 69 IN | TEMPERATURE: 97.5 F | WEIGHT: 260 LBS | BODY MASS INDEX: 38.51 KG/M2 | RESPIRATION RATE: 18 BRPM

## 2023-07-19 DIAGNOSIS — L72.3 SEBACEOUS CYST: ICD-10-CM

## 2023-07-19 PROCEDURE — 88304 TISSUE EXAM BY PATHOLOGIST: CPT | Performed by: PATHOLOGY

## 2023-07-19 PROCEDURE — 13100 CMPLX RPR TRUNK 1.1-2.5 CM: CPT | Performed by: SURGERY

## 2023-07-19 PROCEDURE — NC001 PR NO CHARGE: Performed by: SURGERY

## 2023-07-19 PROCEDURE — 11403 EXC TR-EXT B9+MARG 2.1-3CM: CPT | Performed by: SURGERY

## 2023-07-19 RX ORDER — MAGNESIUM HYDROXIDE 1200 MG/15ML
LIQUID ORAL AS NEEDED
Status: DISCONTINUED | OUTPATIENT
Start: 2023-07-19 | End: 2023-07-19 | Stop reason: HOSPADM

## 2023-07-19 RX ORDER — LIDOCAINE HYDROCHLORIDE AND EPINEPHRINE 10; 10 MG/ML; UG/ML
INJECTION, SOLUTION INFILTRATION; PERINEURAL AS NEEDED
Status: DISCONTINUED | OUTPATIENT
Start: 2023-07-19 | End: 2023-07-19 | Stop reason: HOSPADM

## 2023-07-19 RX ORDER — BUPIVACAINE HYDROCHLORIDE 2.5 MG/ML
INJECTION, SOLUTION EPIDURAL; INFILTRATION; INTRACAUDAL AS NEEDED
Status: DISCONTINUED | OUTPATIENT
Start: 2023-07-19 | End: 2023-07-19 | Stop reason: HOSPADM

## 2023-07-19 RX ORDER — CEFAZOLIN SODIUM 2 G/50ML
2000 SOLUTION INTRAVENOUS ONCE
Status: DISCONTINUED | OUTPATIENT
Start: 2023-07-19 | End: 2023-07-19 | Stop reason: HOSPADM

## 2023-07-19 NOTE — OP NOTE
OPERATIVE REPORT  PATIENT NAME: Philip Mortimer    :  1955  MRN: [id number]  Pt Location: AN OR ROOM 01    SURGERY DATE: 2023    Surgeon(s) and Role:     * Светлана Sargent MD - Primary     * Astrid Sun MD - Assisting    Preop Diagnosis:  Sebaceous cyst [L72.3]    Post-Op Diagnosis Codes:     * Sebaceous cyst [L72.3]    Procedure(s):  EXCISION  BIOPSY LESION/MASS BACK, complex repair    Specimen(s):  ID Type Source Tests Collected by Time Destination   1 : BACK LESION  Tissue Soft Tissue, Other TISSUE EXAM Светлана Sargent MD 2023  9:39 AM        Estimated Blood Loss:   Minimal    Drains:  * No LDAs found *    Anesthesia Type:   Local    Operative Indications:  Sebaceous cyst [L72.3]    Local      Complications:   None    Procedure and Technique:  Patient was identified visually and by armband. Placed in the prone position. The back was prepped and draped in a sterile fashion. Timeout was completed. Combination of Marcaine and lidocaine with epinephrine was utilized throughout the procedure. Elliptical incision was made and carried out through skin subtenons tissue into the deep subcutaneous tissue. The subcutaneous mass was excised. This is most consistent with a sebaceous cyst.  This measures 2.5 x 2 cm in size. Margins are 0. The wound needed to be undermined in order to affect primary closure. This was completed by undermining 1 inch in all directions. The wound edges subcutaneous tissue were then reapproximated with 2 layers of interrupted 0 Vicryl suture. This was followed by interrupted 3-0 nylon suture externally. Dermabond was applied. Patient tolerated this procedure well. Sponge and instrument count correct x2. I was present for the entire procedure.     Patient Disposition:  PACU         SIGNATURE: Светлана Sargent MD  DATE: 2023  TIME: 10:10 AM

## 2023-07-19 NOTE — PROGRESS NOTES
General Surgical Care   Taylor Ramirez 79 y.o. male MRN: [id number]  Unit/Bed#: OR [facility] Encounter: [id number]          ASSESSMENT: Subcutaneous mass over back    PLAN: Excision of subcutaneous mass      Review of Systems  Constitutional:  Denies fever or chills   Eyes:  Denies change in visual acuity   HENT:  Denies nasal congestion or sore throat   Respiratory:  Denies cough or shortness of breath   Cardiovascular:  Denies chest pain or edema   GI:  Denies abdominal pain, nausea, vomiting, bloody stools or diarrhea   Musculoskeletal:  Denies back pain or joint pain   Integument:  Denies rash   Neurologic:  Denies headache, focal weakness or sensory changes   Endocrine:  Denies polyuria or polydipsia   Lymphatic:  Denies swollen glands   Psychiatric:  Denies depression or anxiety     Historical Information   Past Medical History:   Diagnosis Date   • Allergic 2015   • Anxiety June 2022   • CPAP (continuous positive airway pressure) dependence    • Eating disorder adulthood    Stress   • Elevated serum creatinine     last assessed 01/16/2018   • Morbid obesity ([address]) 01/11/2016   • Obesity    • Screening PSA (prostate specific antigen) 04/13/2018   • Sleep apnea    • Tinnitus, left     last assessed 08/11/2014     Past Surgical History:   Procedure Laterality Date   • COLONOSCOPY     • JOINT REPLACEMENT  01/09/2018   • DE ARTHROPLASTY GLENOHUMERAL JOINT TOTAL SHOULDER Right 01/09/2018    Procedure: TOTAL SHOULDER ARTHROPLASTY;  Surgeon: Faisal Tovar MD;  [facility];  Service: Orthopedics   • DE COLONOSCOPY FLX DX W/[facility] REHABILITATION WHEN PFRMD N/A 06/27/2017    Procedure: COLONOSCOPY;  Surgeon: Annmarie Kowalski MD;  Location: [facility];   Service: Colorectal     Social History   Social History     Substance and Sexual Activity   Alcohol Use Yes    Comment: Only during family gatherings     Social History     Substance and Sexual Activity   Drug Use No     Social History     Tobacco Use   Smoking Status Never Smokeless Tobacco Never     Family History   Problem Relation Age of Onset   • Heart disease Mother         Heavy smoker   • Diabetes Mother    • Other Mother         cardiac disorder   • Cancer Father         heavy smoker   • Diabetes Father        Meds/Allergies     Medications Prior to Admission   Medication   • atorvastatin (LIPITOR) 10 mg tablet   • Azelastine HCl 137 MCG/SPRAY SOLN   • naproxen (NAPROSYN) 500 mg tablet   • tadalafil (Cialis) 20 MG tablet     Current Facility-Administered Medications   Medication Dose Route Frequency   • ceFAZolin (ANCEF) IVPB (premix in dextrose) 2,000 mg 50 mL  2,000 mg Intravenous Once       Allergies   Allergen Reactions   • Seasonal Ic [Cholestatin] Itching     Runny eyes and a cough sometimes       Objective     Blood pressure 143/90, pulse (!) 54, temperature (!) 97.3 °F (36.3 °C), temperature source Temporal, resp. rate 18, height 5' 9" (1.753 m), weight 118 kg (260 lb), SpO2 100 %. No intake or output data in the 24 hours ending 07/19/23 0850    PHYSICAL EXAM  General appearance: alert and oriented, in no acute distress  Lungs: clear to auscultation bilaterally  Heart: regular rate and rhythm, S1, S2 normal, no murmur, click, rub or gallop  Abdomen: soft, non-tender; bowel sounds normal; no masses,  no organomegaly  Skin: Skin color, texture, turgor normal. No rashes or lesions  Subcutaneous mass over back  Lab Results:   No visits with results within 1 Day(s) from this visit.    Latest known visit with results is:   Appointment on 03/10/2023   Component Date Value   • WBC 03/10/2023 6.67    • RBC 03/10/2023 4.84    • Hemoglobin 03/10/2023 15.3    • Hematocrit 03/10/2023 43.9    • MCV 03/10/2023 91    • MCH 03/10/2023 31.6    • MCHC 03/10/2023 34.9    • RDW 03/10/2023 12.6    • MPV 03/10/2023 12.1    • Platelets 42/36/9632 149    • nRBC 03/10/2023 0    • Neutrophils Relative 03/10/2023 52    • Immat GRANS % 03/10/2023 0    • Lymphocytes Relative 03/10/2023 36    • Monocytes Relative 03/10/2023 8    • Eosinophils Relative 03/10/2023 3    • Basophils Relative 03/10/2023 1    • Neutrophils Absolute 03/10/2023 3.44    • Immature Grans Absolute 03/10/2023 0.03    • Lymphocytes Absolute 03/10/2023 2.42    • Monocytes Absolute 03/10/2023 0.54    • Eosinophils Absolute 03/10/2023 0.20    • Basophils Absolute 03/10/2023 0.04    • Sodium 03/10/2023 139    • Potassium 03/10/2023 3.9    • Chloride 03/10/2023 104    • CO2 03/10/2023 29    • ANION GAP 03/10/2023 6    • BUN 03/10/2023 22    • Creatinine 03/10/2023 1.24    • Glucose, Fasting 03/10/2023 105 (H)    • Calcium 03/10/2023 9.3    • AST 03/10/2023 14    • ALT 03/10/2023 16    • Alkaline Phosphatase 03/10/2023 49    • Total Protein 03/10/2023 7.1    • Albumin 03/10/2023 4.1    • Total Bilirubin 03/10/2023 0.96    • eGFR 03/10/2023 59    • Hemoglobin A1C 03/10/2023 5.1    • EAG 03/10/2023 100    • Cholesterol 03/10/2023 151    • Triglycerides 03/10/2023 133    • HDL, Direct 03/10/2023 35 (L)    • LDL Calculated 03/10/2023 89      Imaging Studies: I have personally reviewed pertinent reports. No results found. Counseling / Coordination of Care  Total time spent today  15 minutes. Greater than 50% of total time was spent with the patient and / or family counseling and / or coordination of care.

## 2023-07-28 PROCEDURE — 88304 TISSUE EXAM BY PATHOLOGIST: CPT | Performed by: PATHOLOGY

## 2023-08-07 ENCOUNTER — OFFICE VISIT (OUTPATIENT)
Dept: SURGERY | Facility: CLINIC | Age: 68
End: 2023-08-07

## 2023-08-07 DIAGNOSIS — L72.3 SEBACEOUS CYST: Primary | ICD-10-CM

## 2023-08-07 PROCEDURE — 99024 POSTOP FOLLOW-UP VISIT: CPT | Performed by: SURGERY

## 2023-08-07 NOTE — PROGRESS NOTES
Assessment/Plan: Patient is status post excision of a sebaceous cyst on the back. He returns today for follow-up visit. The incision is clean and healing well. No evidence for infection. Wound care instructions provided. There are no diagnoses linked to this encounter. Pathology: Reviewed with patient, all questions answered. Postoperative restrictions reviewed. All questions answered. ______________________________________________________  HPI: Patient presents post operatively. Excision biopsy lesion/mass back 7/19/2023   Final Diagnosis  A. Skin, back, excision:  Follicular cyst, infundibular type (epidermal inclusion cyst). ROS:  General ROS: negative for - chills, fatigue, fever or night sweats, weight loss  Respiratory ROS: no cough, shortness of breath, or wheezing  Cardiovascular ROS: no chest pain or dyspnea on exertion  Genito-Urinary ROS: no dysuria, trouble voiding, or hematuria  Musculoskeletal ROS: negative for - gait disturbance, joint pain or muscle pain  Neurological ROS: no TIA or stroke symptoms  GI ROS: see HPI  Skin ROS: no new rashes or lesions   Lymphatic ROS: no new adenopathy noted by pt.    GYN ROS: see HPI, no new GYN history or bleeding noted  Psy ROS: no new mental or behavioral disturbances         Patient Active Problem List   Diagnosis   • Dyslipidemia   • Impaired fasting glucose   • Sleep-related hypoventilation   • Cervical lymphadenopathy   • Hypersomnia   • Morbid obesity with BMI of 40.0-44.9, adult (HCC)   • Seasonal allergies   • Erectile dysfunction   • JAZMYN (obstructive sleep apnea)   • Right hand pain   • Sebaceous cyst       Allergies:  Seasonal ic [cholestatin]      Current Outpatient Medications:   •  atorvastatin (LIPITOR) 10 mg tablet, Take 1 tablet (10 mg total) by mouth daily, Disp: 90 tablet, Rfl: 3  •  Azelastine HCl 137 MCG/SPRAY SOLN, 1 spray into each nostril 2 (two) times a day, Disp: 90 mL, Rfl: 3  •  naproxen (NAPROSYN) 500 mg tablet, Take 1 tablet (500 mg total) by mouth 2 (two) times a day as needed for moderate pain, Disp: 90 tablet, Rfl: 3  •  tadalafil (Cialis) 20 MG tablet, Take 1 tablet (20 mg total) by mouth daily as needed for erectile dysfunction Take 1 hour prior to activity, Disp: 30 tablet, Rfl: 3    Past Medical History:   Diagnosis Date   • Allergic 2015   • Anxiety June 2022   • CPAP (continuous positive airway pressure) dependence    • Eating disorder adulthood    Stress   • Elevated serum creatinine     last assessed 01/16/2018   • Morbid obesity ([address]) 01/11/2016   • Obesity    • Screening PSA (prostate specific antigen) 04/13/2018   • Sleep apnea    • Tinnitus, left     last assessed 08/11/2014       Past Surgical History:   Procedure Laterality Date   • COLONOSCOPY     • JOINT REPLACEMENT  01/09/2018   • CA ARTHROPLASTY GLENOHUMERAL JOINT TOTAL SHOULDER Right 01/09/2018    Procedure: TOTAL SHOULDER ARTHROPLASTY;  Surgeon: Tuan Bruce MD;  [facility];  Service: Orthopedics   • CA COLONOSCOPY FLX DX W/COLLJ [facility] WHEN PFRMD N/A 06/27/2017    Procedure: COLONOSCOPY;  Surgeon: Valeri Ortiz MD;  Location: [facility]; Service: Colorectal   • CA EXCISION TUMOR SOFT TIS BACK/FLANK SUBQ 3 CM/> N/A 7/19/2023    Procedure: EXCISION  BIOPSY LESION/MASS BACK;  Surgeon: Rahul Mc MD;  [facility];  Service: General       Family History   Problem Relation Age of Onset   • Heart disease Mother         Heavy smoker   • Diabetes Mother    • Other Mother         cardiac disorder   • Cancer Father         heavy smoker   • Diabetes Father         reports that he has never smoked. He has never used smokeless tobacco. He reports current alcohol use. He reports that he does not use drugs. PHYSICAL EXAM    There were no vitals taken for this visit.     General: normal, cooperative, no distress  Abdominal: soft, nondistended or nontender  Incision: clean, dry, and intact and healing well      Klood Brenda Valenzuela MD    Date: 8/7/2023 Time: 9:49 AM

## 2023-09-28 DIAGNOSIS — N52.9 ERECTILE DYSFUNCTION, UNSPECIFIED ERECTILE DYSFUNCTION TYPE: ICD-10-CM

## 2023-09-28 DIAGNOSIS — J30.2 SEASONAL ALLERGIES: ICD-10-CM

## 2023-09-29 RX ORDER — AZELASTINE HYDROCHLORIDE 137 UG/1
1 SPRAY, METERED NASAL 2 TIMES DAILY
Qty: 60 ML | Refills: 3 | Status: SHIPPED | OUTPATIENT
Start: 2023-09-29

## 2023-09-29 RX ORDER — TADALAFIL 20 MG/1
TABLET ORAL
Qty: 30 TABLET | Refills: 3 | Status: SHIPPED | OUTPATIENT
Start: 2023-09-29

## 2023-10-03 ENCOUNTER — APPOINTMENT (OUTPATIENT)
Dept: LAB | Facility: CLINIC | Age: 68
End: 2023-10-03
Payer: COMMERCIAL

## 2023-10-03 DIAGNOSIS — E78.5 DYSLIPIDEMIA: ICD-10-CM

## 2023-10-03 DIAGNOSIS — E66.01 MORBID OBESITY WITH BMI OF 40.0-44.9, ADULT (HCC): ICD-10-CM

## 2023-10-03 DIAGNOSIS — Z12.5 SCREENING PSA (PROSTATE SPECIFIC ANTIGEN): ICD-10-CM

## 2023-10-03 DIAGNOSIS — R73.01 IMPAIRED FASTING GLUCOSE: ICD-10-CM

## 2023-10-03 LAB
ALBUMIN SERPL BCP-MCNC: 4.1 G/DL (ref 3.5–5)
ALP SERPL-CCNC: 46 U/L (ref 34–104)
ALT SERPL W P-5'-P-CCNC: 26 U/L (ref 7–52)
ANION GAP SERPL CALCULATED.3IONS-SCNC: 6 MMOL/L
AST SERPL W P-5'-P-CCNC: 18 U/L (ref 13–39)
BASOPHILS # BLD AUTO: 0.05 THOUSANDS/ÂΜL (ref 0–0.1)
BASOPHILS NFR BLD AUTO: 1 % (ref 0–1)
BILIRUB SERPL-MCNC: 0.83 MG/DL (ref 0.2–1)
BUN SERPL-MCNC: 25 MG/DL (ref 5–25)
CALCIUM SERPL-MCNC: 9.4 MG/DL (ref 8.4–10.2)
CHLORIDE SERPL-SCNC: 106 MMOL/L (ref 96–108)
CHOLEST SERPL-MCNC: 176 MG/DL
CO2 SERPL-SCNC: 26 MMOL/L (ref 21–32)
CREAT SERPL-MCNC: 1.24 MG/DL (ref 0.6–1.3)
EOSINOPHIL # BLD AUTO: 0.16 THOUSAND/ÂΜL (ref 0–0.61)
EOSINOPHIL NFR BLD AUTO: 3 % (ref 0–6)
ERYTHROCYTE [DISTWIDTH] IN BLOOD BY AUTOMATED COUNT: 12.3 % (ref 11.6–15.1)
EST. AVERAGE GLUCOSE BLD GHB EST-MCNC: 103 MG/DL
GFR SERPL CREATININE-BSD FRML MDRD: 59 ML/MIN/1.73SQ M
GLUCOSE P FAST SERPL-MCNC: 105 MG/DL (ref 65–99)
HBA1C MFR BLD: 5.2 %
HCT VFR BLD AUTO: 42.4 % (ref 36.5–49.3)
HDLC SERPL-MCNC: 38 MG/DL
HGB BLD-MCNC: 15 G/DL (ref 12–17)
IMM GRANULOCYTES # BLD AUTO: 0.02 THOUSAND/UL (ref 0–0.2)
IMM GRANULOCYTES NFR BLD AUTO: 0 % (ref 0–2)
LDLC SERPL CALC-MCNC: 117 MG/DL (ref 0–100)
LYMPHOCYTES # BLD AUTO: 2.44 THOUSANDS/ÂΜL (ref 0.6–4.47)
LYMPHOCYTES NFR BLD AUTO: 41 % (ref 14–44)
MCH RBC QN AUTO: 31.6 PG (ref 26.8–34.3)
MCHC RBC AUTO-ENTMCNC: 35.4 G/DL (ref 31.4–37.4)
MCV RBC AUTO: 89 FL (ref 82–98)
MONOCYTES # BLD AUTO: 0.37 THOUSAND/ÂΜL (ref 0.17–1.22)
MONOCYTES NFR BLD AUTO: 6 % (ref 4–12)
NEUTROPHILS # BLD AUTO: 2.97 THOUSANDS/ÂΜL (ref 1.85–7.62)
NEUTS SEG NFR BLD AUTO: 49 % (ref 43–75)
NRBC BLD AUTO-RTO: 0 /100 WBCS
PLATELET # BLD AUTO: 136 THOUSANDS/UL (ref 149–390)
PMV BLD AUTO: 11.7 FL (ref 8.9–12.7)
POTASSIUM SERPL-SCNC: 4.1 MMOL/L (ref 3.5–5.3)
PROT SERPL-MCNC: 6.9 G/DL (ref 6.4–8.4)
PSA SERPL-MCNC: 0.35 NG/ML (ref 0–4)
RBC # BLD AUTO: 4.75 MILLION/UL (ref 3.88–5.62)
SODIUM SERPL-SCNC: 138 MMOL/L (ref 135–147)
TRIGL SERPL-MCNC: 105 MG/DL
WBC # BLD AUTO: 6.01 THOUSAND/UL (ref 4.31–10.16)

## 2023-10-03 PROCEDURE — 85025 COMPLETE CBC W/AUTO DIFF WBC: CPT

## 2023-10-03 PROCEDURE — G0103 PSA SCREENING: HCPCS

## 2023-10-03 PROCEDURE — 80061 LIPID PANEL: CPT

## 2023-10-03 PROCEDURE — 36415 COLL VENOUS BLD VENIPUNCTURE: CPT

## 2023-10-03 PROCEDURE — 80053 COMPREHEN METABOLIC PANEL: CPT

## 2023-10-03 PROCEDURE — 83036 HEMOGLOBIN GLYCOSYLATED A1C: CPT

## 2023-10-10 ENCOUNTER — OFFICE VISIT (OUTPATIENT)
Dept: INTERNAL MEDICINE CLINIC | Facility: CLINIC | Age: 68
End: 2023-10-10
Payer: COMMERCIAL

## 2023-10-10 VITALS
HEART RATE: 62 BPM | SYSTOLIC BLOOD PRESSURE: 120 MMHG | HEIGHT: 69 IN | DIASTOLIC BLOOD PRESSURE: 78 MMHG | WEIGHT: 270.4 LBS | BODY MASS INDEX: 40.05 KG/M2 | OXYGEN SATURATION: 96 % | RESPIRATION RATE: 16 BRPM

## 2023-10-10 DIAGNOSIS — E66.01 MORBID OBESITY WITH BMI OF 40.0-44.9, ADULT (HCC): ICD-10-CM

## 2023-10-10 DIAGNOSIS — R73.01 IMPAIRED FASTING GLUCOSE: ICD-10-CM

## 2023-10-10 DIAGNOSIS — E78.5 DYSLIPIDEMIA: Primary | ICD-10-CM

## 2023-10-10 DIAGNOSIS — D69.6 THROMBOCYTOPENIA (HCC): ICD-10-CM

## 2023-10-10 PROBLEM — L72.3 SEBACEOUS CYST: Status: RESOLVED | Noted: 2023-06-12 | Resolved: 2023-10-10

## 2023-10-10 PROBLEM — M79.641 RIGHT HAND PAIN: Status: RESOLVED | Noted: 2022-03-07 | Resolved: 2023-10-10

## 2023-10-10 PROCEDURE — 99214 OFFICE O/P EST MOD 30 MIN: CPT | Performed by: INTERNAL MEDICINE

## 2023-10-10 NOTE — ASSESSMENT & PLAN NOTE
BMI Counseling: Body mass index is 39.93 kg/m². The BMI is above normal. Exercise recommendations include exercising 3-5 times per week.    Continue low calorie diet

## 2023-10-10 NOTE — PROGRESS NOTES
Assessment/Plan: Morbid obesity with BMI of 40.0-44.9, adult ([facility])  BMI Counseling: Body mass index is 39.93 kg/m². The BMI is above normal. Exercise recommendations include exercising 3-5 times per week. Continue low calorie diet      Dyslipidemia  Agrees to CAC CT score      Consider RSV vaccine     Problem List Items Addressed This Visit        Endocrine    Impaired fasting glucose    Relevant Orders    Comprehensive metabolic panel    HEMOGLOBIN A1C W/ EAG ESTIMATION       Other    Dyslipidemia - Primary     Agrees to CAC CT score         Relevant Orders    CT coronary calcium score    HEMOGLOBIN A1C W/ EAG ESTIMATION    Lipid panel    Morbid obesity with BMI of 40.0-44.9, adult ([facility])     BMI Counseling: Body mass index is 39.93 kg/m². The BMI is above normal. Exercise recommendations include exercising 3-5 times per week. Continue low calorie diet          Other Visit Diagnoses     Thrombocytopenia ([facility])        Relevant Orders    CBC and Platelet            Subjective:      Patient ID: Crissy Thornton is a 76 y.o. male. HPI  Here for a follow up  Feeling well overall  Started using Noom paty 6months ago and has lost 20lbs. He is eating smaller portions and limiting calori intake to around 2100 a day. He is not exercising  Recent labs reviewed and LDL is higher at 117  The following portions of the patient's history were reviewed and updated as appropriate: allergies, current medications, past family history, past medical history, past social history, past surgical history and problem list.    Review of Systems   Constitutional: Negative for unexpected weight change. Respiratory: Negative for shortness of breath. Cardiovascular: Negative for chest pain and palpitations. Gastrointestinal: Negative for abdominal pain, constipation and diarrhea. Musculoskeletal: Positive for arthralgias.          Objective:      /78   Pulse 62   Resp 16   Ht 5' 9" (1.753 m)   Wt 123 kg (270 lb 6.4 oz) SpO2 96%   BMI 39.93 kg/m²          Physical Exam  Constitutional:       Appearance: He is obese. He is not ill-appearing. Cardiovascular:      Rate and Rhythm: Regular rhythm. Heart sounds: Normal heart sounds. Pulmonary:      Breath sounds: Normal breath sounds. Abdominal:      Palpations: Abdomen is soft. Tenderness: There is no abdominal tenderness. Musculoskeletal:      Right lower leg: No edema. Left lower leg: No edema. Lymphadenopathy:      Head:      Left side of head: Submandibular adenopathy present.    Psychiatric:         Mood and Affect: Mood normal.         Behavior: Behavior normal.

## 2023-10-31 ENCOUNTER — HOSPITAL ENCOUNTER (OUTPATIENT)
Dept: CT IMAGING | Facility: HOSPITAL | Age: 68
Discharge: HOME | End: 2023-10-31
Payer: COMMERCIAL

## 2023-10-31 DIAGNOSIS — E78.5 DYSLIPIDEMIA: ICD-10-CM

## 2023-10-31 PROCEDURE — G1004 CDSM NDSC: HCPCS

## 2023-10-31 PROCEDURE — 75571 CT HRT W/O DYE W/CA TEST: CPT

## 2023-11-24 ENCOUNTER — OFFICE VISIT (OUTPATIENT)
Dept: INTERNAL MEDICINE CLINIC | Facility: CLINIC | Age: 68
End: 2023-11-24
Payer: COMMERCIAL

## 2023-11-24 VITALS
SYSTOLIC BLOOD PRESSURE: 140 MMHG | DIASTOLIC BLOOD PRESSURE: 72 MMHG | HEART RATE: 64 BPM | HEIGHT: 69 IN | BODY MASS INDEX: 41.09 KG/M2 | WEIGHT: 277.4 LBS | OXYGEN SATURATION: 96 %

## 2023-11-24 DIAGNOSIS — J06.9 ACUTE URI: Primary | ICD-10-CM

## 2023-11-24 PROCEDURE — 99213 OFFICE O/P EST LOW 20 MIN: CPT | Performed by: INTERNAL MEDICINE

## 2023-11-24 NOTE — PATIENT INSTRUCTIONS
-Take Mucinex DM for cough and congestion as needed  -Tylenol for pain and/or fever  -Patient advised to drink plenty of fluids and get rest

## 2023-11-24 NOTE — PROGRESS NOTES
Name: Betty Villela      : 1955      MRN: [id number]  Encounter Provider: Daniel Broderick MD  Encounter Date: 2023   Encounter department: [facility]     1. Acute URI    -Take Mucinex DM for cough and congestion as needed  -Tylenol for pain and/or fever  -Patient advised to drink plenty of fluids and get rest          Subjective      HPI  Patient presents as an acute visit. He complains of cough, body aches, chills, and night sweats over the past 5-6 days. He reports he was around his grandchildren who had recently recovered from a cold. He denies any SOB or wheezing. For symptom management he has take Naproxen. All other systems negative except for pertinent findings noted in HPI. Current Outpatient Medications on File Prior to Visit   Medication Sig   • Azelastine HCl 137 MCG/SPRAY SOLN USE 1 SPRAY IN EACH NOSTRILTWICE DAILY   • naproxen (NAPROSYN) 500 mg tablet Take 1 tablet (500 mg total) by mouth 2 (two) times a day as needed for moderate pain   • tadalafil (CIALIS) 20 MG tablet TAKE 1 TABLET DAILY AS     NEEDED FOR ERECTILE        DYSFUNCTION.  TAKE 1 HOUR   PRIOR TO ACTIVITY       Objective     /72   Pulse 64   Ht 5' 9" (1.753 m)   Wt 126 kg (277 lb 6.4 oz)   SpO2 96%   BMI 40.96 kg/m²     BP Readings from Last 3 Encounters:   23 140/72   10/10/23 120/78   23 137/72        Wt Readings from Last 3 Encounters:   23 126 kg (277 lb 6.4 oz)   10/10/23 123 kg (270 lb 6.4 oz)   23 118 kg (260 lb)       Physical Exam    General: NAD  HEENT: NCAT, EOMI, normal conjunctiva  Cardiovascular: RRR, normal S1 and S2, no m/r/g  Pulmonary: Normal respiratory effort, no wheezes, rales or rhonchi  GI: Soft, nontender, nondistended, normoactive bowel sounds  Extremities: No lower extremity edema  Skin: Normal skin color, no rashes     Fabian Simon MD

## 2024-01-10 ENCOUNTER — OFFICE VISIT (OUTPATIENT)
Dept: SLEEP CENTER | Facility: CLINIC | Age: 69
End: 2024-01-10
Payer: COMMERCIAL

## 2024-01-10 VITALS
DIASTOLIC BLOOD PRESSURE: 90 MMHG | WEIGHT: 279 LBS | BODY MASS INDEX: 41.32 KG/M2 | HEIGHT: 69 IN | SYSTOLIC BLOOD PRESSURE: 138 MMHG

## 2024-01-10 DIAGNOSIS — G47.33 OSA (OBSTRUCTIVE SLEEP APNEA): Primary | ICD-10-CM

## 2024-01-10 DIAGNOSIS — E66.01 MORBID OBESITY WITH BMI OF 40.0-44.9, ADULT (HCC): ICD-10-CM

## 2024-01-10 DIAGNOSIS — F51.12 INSUFFICIENT SLEEP SYNDROME: ICD-10-CM

## 2024-01-10 DIAGNOSIS — R68.2 DRY MOUTH: ICD-10-CM

## 2024-01-10 DIAGNOSIS — J30.2 SEASONAL ALLERGIES: ICD-10-CM

## 2024-01-10 DIAGNOSIS — Z56.6 STRESS AT WORK: ICD-10-CM

## 2024-01-10 PROCEDURE — 99214 OFFICE O/P EST MOD 30 MIN: CPT | Performed by: INTERNAL MEDICINE

## 2024-01-10 RX ORDER — CEFDINIR 300 MG/1
300 CAPSULE ORAL 2 TIMES DAILY
COMMUNITY
Start: 2024-01-04 | End: 2024-01-14

## 2024-01-10 SDOH — HEALTH STABILITY - MENTAL HEALTH: OTHER PHYSICAL AND MENTAL STRAIN RELATED TO WORK: Z56.6

## 2024-01-10 NOTE — PATIENT INSTRUCTIONS

## 2024-01-10 NOTE — PROGRESS NOTES
Follow-Up Note - Sleep [facility]   Tashi Alexander  68 y.o. male  :1955  MRN:[id number]  DOS:1/10/2024    CC: I saw this patient for follow-up in clinic today for Sleep disordered breathing, Coexisting Sleep and Medical Problems.  He is using a ResMed machine.  Interval changes: None reported.    Results of prior studies in 2015: The diagnostic study demonstrated AHI of 69 per hour. Minimum oxygen saturation was 70% any spent is 96.8% of time asleep with saturations below 90%. During the subsequent therapeutic study, sleep disordered breathing was adequately remediated with BiPAP at 19/14 cm H2O.      PFSH, Problem List, Medications & Allergies were reviewed in EMR.   He  has a past medical history of Allergic (), Anxiety (2022), CPAP (continuous positive airway pressure) dependence, Eating disorder (adulthood), Elevated serum creatinine, Morbid obesity (HCC) (2016), Obesity, Right hand pain (3/7/2022), Screening PSA (prostate specific antigen) (2018), Sebaceous cyst (2023), Sleep apnea, and Tinnitus, left.    He has a current medication list which includes the following prescription(s): azelastine hcl, cefdinir, naproxen, and tadalafil.    PHYSIOLOGICAL DATA REVIEW : Using PAP > 4 hours/night 83%. Estimated SHUN 0.5/hour with pressure of 20/10cm H2O@90th/95th percentile; patient has not been using non FDA approved devices to sanitize the machine.  INTERPRETATION: Compliance is Very good; Pressure setting is:optimal; ;   SUBJECTIVE: With respect to use of PAP, Tashi  is experiencing some adverse effects:mask leaks that causes him to remove the mask at night.;  Dry mouth is controlled..He derives benefit.  Is satisfied with sleep and daytime function.   Sleep Routine: Tashi reports getting 6-7 hrs sleep; he has no difficulty initiating or maintaining sleep . He arises spontaneously and usually feels refreshed.Tashi reports significantly improved since on CPAP.  Denies daytime  "sleepiness, or napping.  He rated himself at Total score: 4 /24 on the [place] Sleepiness Scale.   Other issues: Stress at work and at times can disturb sleep.     Habits: Reports that he has never smoked. He has never used smokeless tobacco.,  Reports current alcohol use.,  Reports no history of drug use., Caffeine use:limited; Exercise routine: \"not enough\".      ROS: Significant for some weight gain.  Some postnasal drip but otherwise allergies controlled.  A 10 point review of systems was otherwise negative..    EXAM: /90   Ht 5' 9\" (1.753 m)   Wt 127 kg (279 lb)   BMI 41.20 kg/m²     Wt Readings from Last 3 Encounters:   01/10/24 127 kg (279 lb)   11/24/23 126 kg (277 lb 6.4 oz)   10/10/23 123 kg (270 lb 6.4 oz)      Patient is well groomed; well appearing.   CNS: Alert, orientated, speech clear & coherent  Psych: cooperative and in no distress. Mental state: Appears normal.  H&N: EOMI; NC/AT: No facial pressure marks, no rashes.    Skin/Extrem: col & hydration normal; no edema  Resp: Respiratory effort is normal  Physical findings otherwise essentially unchanged from previous.    IMPRESSION: Problem List Items & Comorbidities Addressed this Visit    1. JAZMYN (obstructive sleep apnea)  PAP DME Resupply/Reorder      2. Insufficient sleep syndrome        3. Dry mouth        4. Seasonal allergies        5. Stress at work        6. Morbid obesity with BMI of 40.0-44.9, adult (HCC)            PLAN:  I reviewed results of prior studies and physiologic data with the patient.   I discussed treatment options with risks and benefits.  Treatment with  PAP is medically necessary and Tashi is agreable to continue use.   Care of equipment, methods to improve comfort using PAP and importance of compliance with therapy were discussed.  Pressure setting: continue 20/10 cmH2O.    Rx provided to replace supplies and Care coordinated with DME provider.   Continue azelastine for seasonal allergies.  Discussed strategies for " "stress and weight reduction.    I advised allowing sufficient opportunity for sleep.  He is due to retire in a few months and will have more opportunity for sleep.  Follow-up is advised in 1 year or sooner if needed to monitor progress, compliance and to adjust therapy.     Thank you for allowing me to participate in the care of this patient.    Sincerely,     Authenticated electronically on 01/10/24   Board Certified Specialist     Portions of the record may have been created with voice recognition software. Occasional wrong word or \"sound a like\" substitutions may have occurred due to the inherent limitations of voice recognition software. There may also be notations and random deletions of words or characters from malfunctioning software. Read the chart carefully and recognize, using context, where substitutions/deletions have occurred.    "

## 2024-01-12 ENCOUNTER — TELEPHONE (OUTPATIENT)
Dept: SLEEP CENTER | Facility: CLINIC | Age: 69
End: 2024-01-12

## 2024-01-12 LAB
DME PARACHUTE DELIVERY DATE REQUESTED: NORMAL
DME PARACHUTE ITEM DESCRIPTION: NORMAL
DME PARACHUTE ORDER STATUS: NORMAL
DME PARACHUTE SUPPLIER NAME: NORMAL
DME PARACHUTE SUPPLIER PHONE: NORMAL

## 2024-01-18 LAB

## 2024-04-13 ENCOUNTER — APPOINTMENT (OUTPATIENT)
Dept: LAB | Facility: CLINIC | Age: 69
End: 2024-04-13
Payer: COMMERCIAL

## 2024-04-13 DIAGNOSIS — D69.6 THROMBOCYTOPENIA (HCC): ICD-10-CM

## 2024-04-13 DIAGNOSIS — E78.5 DYSLIPIDEMIA: ICD-10-CM

## 2024-04-13 DIAGNOSIS — R73.01 IMPAIRED FASTING GLUCOSE: ICD-10-CM

## 2024-04-13 LAB
ALBUMIN SERPL BCP-MCNC: 3.9 G/DL (ref 3.5–5)
ALP SERPL-CCNC: 42 U/L (ref 34–104)
ALT SERPL W P-5'-P-CCNC: 16 U/L (ref 7–52)
ANION GAP SERPL CALCULATED.3IONS-SCNC: 5 MMOL/L (ref 4–13)
AST SERPL W P-5'-P-CCNC: 12 U/L (ref 13–39)
BILIRUB SERPL-MCNC: 1.01 MG/DL (ref 0.2–1)
BUN SERPL-MCNC: 28 MG/DL (ref 5–25)
CALCIUM SERPL-MCNC: 9.2 MG/DL (ref 8.4–10.2)
CHLORIDE SERPL-SCNC: 105 MMOL/L (ref 96–108)
CHOLEST SERPL-MCNC: 184 MG/DL
CO2 SERPL-SCNC: 29 MMOL/L (ref 21–32)
CREAT SERPL-MCNC: 1.18 MG/DL (ref 0.6–1.3)
ERYTHROCYTE [DISTWIDTH] IN BLOOD BY AUTOMATED COUNT: 12.6 % (ref 11.6–15.1)
EST. AVERAGE GLUCOSE BLD GHB EST-MCNC: 103 MG/DL
GFR SERPL CREATININE-BSD FRML MDRD: 63 ML/MIN/1.73SQ M
GLUCOSE P FAST SERPL-MCNC: 105 MG/DL (ref 65–99)
HBA1C MFR BLD: 5.2 %
HCT VFR BLD AUTO: 42.3 % (ref 36.5–49.3)
HDLC SERPL-MCNC: 34 MG/DL
HGB BLD-MCNC: 14.9 G/DL (ref 12–17)
LDLC SERPL CALC-MCNC: 114 MG/DL (ref 0–100)
MCH RBC QN AUTO: 32 PG (ref 26.8–34.3)
MCHC RBC AUTO-ENTMCNC: 35.2 G/DL (ref 31.4–37.4)
MCV RBC AUTO: 91 FL (ref 82–98)
NONHDLC SERPL-MCNC: 150 MG/DL
PLATELET # BLD AUTO: 125 THOUSANDS/UL (ref 149–390)
PMV BLD AUTO: 11.3 FL (ref 8.9–12.7)
POTASSIUM SERPL-SCNC: 3.8 MMOL/L (ref 3.5–5.3)
PROT SERPL-MCNC: 6.9 G/DL (ref 6.4–8.4)
RBC # BLD AUTO: 4.65 MILLION/UL (ref 3.88–5.62)
SODIUM SERPL-SCNC: 139 MMOL/L (ref 135–147)
TRIGL SERPL-MCNC: 180 MG/DL
WBC # BLD AUTO: 7.01 THOUSAND/UL (ref 4.31–10.16)

## 2024-04-13 PROCEDURE — 83036 HEMOGLOBIN GLYCOSYLATED A1C: CPT

## 2024-04-13 PROCEDURE — 80053 COMPREHEN METABOLIC PANEL: CPT

## 2024-04-13 PROCEDURE — 85027 COMPLETE CBC AUTOMATED: CPT

## 2024-04-13 PROCEDURE — 36415 COLL VENOUS BLD VENIPUNCTURE: CPT

## 2024-04-13 PROCEDURE — 80061 LIPID PANEL: CPT

## 2024-04-18 ENCOUNTER — OFFICE VISIT (OUTPATIENT)
Dept: INTERNAL MEDICINE CLINIC | Facility: CLINIC | Age: 69
End: 2024-04-18
Payer: COMMERCIAL

## 2024-04-18 VITALS
OXYGEN SATURATION: 97 % | BODY MASS INDEX: 43.1 KG/M2 | WEIGHT: 291 LBS | RESPIRATION RATE: 16 BRPM | HEIGHT: 69 IN | SYSTOLIC BLOOD PRESSURE: 124 MMHG | DIASTOLIC BLOOD PRESSURE: 74 MMHG | HEART RATE: 61 BPM

## 2024-04-18 DIAGNOSIS — E78.5 DYSLIPIDEMIA: ICD-10-CM

## 2024-04-18 DIAGNOSIS — E66.01 MORBID OBESITY WITH BMI OF 40.0-44.9, ADULT (HCC): ICD-10-CM

## 2024-04-18 DIAGNOSIS — Z00.00 ANNUAL PHYSICAL EXAM: Primary | ICD-10-CM

## 2024-04-18 DIAGNOSIS — Z12.5 SCREENING PSA (PROSTATE SPECIFIC ANTIGEN): ICD-10-CM

## 2024-04-18 PROCEDURE — 99397 PER PM REEVAL EST PAT 65+ YR: CPT | Performed by: INTERNAL MEDICINE

## 2024-04-18 RX ORDER — TIRZEPATIDE 2.5 MG/.5ML
2.5 INJECTION, SOLUTION SUBCUTANEOUS WEEKLY
Qty: 2 ML | Refills: 0 | Status: SHIPPED | OUTPATIENT
Start: 2024-04-18 | End: 2024-05-16

## 2024-04-18 NOTE — PROGRESS NOTES
ADULT ANNUAL PHYSICAL  [facility]    NAME: Tashi Alexander  AGE: 68 y.o. SEX: male  : 1955     DATE: 2024     Assessment and Plan:     Problem List Items Addressed This Visit        Other    Dyslipidemia    Relevant Orders    Comprehensive metabolic panel    Lipid panel    Morbid obesity with BMI of 40.0-44.9, adult (HCC)    Relevant Medications    tirzepatide (Zepbound) 2.5 mg/0.5 mL auto-injector   Other Visit Diagnoses     Annual physical exam    -  Primary    Screening PSA (prostate specific antigen)        Relevant Orders    PSA, Total Screen            Immunizations and preventive care screenings were discussed with patient today. Appropriate education was printed on patient's after visit summary.    Counseling:  Exercise: the importance of regular exercise/physical activity was discussed. Recommend exercise 3-5 times per week for at least 30 minutes.          Return in about 6 months (around 10/18/2024).     Chief Complaint:     Chief Complaint   Patient presents with   • Annual Exam      History of Present Illness:     Adult Annual Physical   Patient here for a comprehensive physical exam. The patient reports problems - weight .    Diet and Physical Activity  Diet/Nutrition: poor diet.   Exercise: no formal exercise.      Depression Screening  PHQ-2/9 Depression Screening    Little interest or pleasure in doing things: 0 - not at all  Feeling down, depressed, or hopeless: 0 - not at all  PHQ-2 Score: 0  PHQ-2 Interpretation: Negative depression screen       General Health  Sleep: sleeps well and using CPAP .   Hearing: normal - bilateral.  Vision: goes for regular eye exams.   Dental: regular dental visits.        Review of Systems:     Review of Systems   Constitutional:  Positive for unexpected weight change (weight gain).   Respiratory:  Negative for shortness of breath.    Cardiovascular:  Negative for chest pain and palpitations.    Gastrointestinal:  Negative for abdominal pain, constipation and diarrhea.   Genitourinary:  Negative for difficulty urinating.   Neurological:  Negative for dizziness and headaches.      Past Medical History:     Past Medical History:   Diagnosis Date   • Allergic 2015   • Anxiety June 2022   • CPAP (continuous positive airway pressure) dependence    • Eating disorder adulthood    Stress   • Elevated serum creatinine     last assessed 01/16/2018   • Morbid obesity (HCC) 01/11/2016   • Obesity    • Right hand pain 3/7/2022   • Screening PSA (prostate specific antigen) 04/13/2018   • Sebaceous cyst 6/12/2023   • Sleep apnea    • Tinnitus, left     last assessed 08/11/2014      Past Surgical History:     Past Surgical History:   Procedure Laterality Date   • COLONOSCOPY     • JOINT REPLACEMENT  01/09/2018   • NC ARTHROPLASTY GLENOHUMERAL JOINT TOTAL SHOULDER Right 01/09/2018    Procedure: TOTAL SHOULDER ARTHROPLASTY;  Surgeon: Robert Urban MD;  [facility];  Service: Orthopedics   • NC COLONOSCOPY FLX DX W/COLLJ SPEC WHEN PFRMD N/A 06/27/2017    Procedure: COLONOSCOPY;  Surgeon: Serge Blevins MD;  Location: [facility];  Service: Colorectal   • NC EXCISION TUMOR SOFT TIS BACK/FLANK SUBQ 3 CM/> N/A 7/19/2023    Procedure: EXCISION  BIOPSY LESION/MASS BACK;  Surgeon: Ari Ahmadi MD;  [facility];  Service: General      Family History:     Family History   Problem Relation Age of Onset   • Heart disease Mother         Heavy smoker   • Diabetes Mother    • Other Mother         cardiac disorder   • Cancer Father         heavy smoker   • Diabetes Father       Social History:     Social History     Socioeconomic History   • Marital status: /Civil Union     Spouse name: None   • Number of children: None   • Years of education: None   • Highest education level: None   Occupational History   • None   Tobacco Use   • Smoking status: Never   • Smokeless tobacco: Never   Vaping Use   •  "Vaping status: Never Used   Substance and Sexual Activity   • Alcohol use: Yes     Comment: Only during family gatherings   • Drug use: No   • Sexual activity: Yes     Partners: Female     Birth control/protection: Post-menopausal   Other Topics Concern   • None   Social History Narrative   • None     Social Determinants of Health     Financial Resource Strain: Not on file   Food Insecurity: Not on file   Transportation Needs: Not on file   Physical Activity: Not on file   Stress: Not on file   Social Connections: Not on file   Intimate Partner Violence: Not on file   Housing Stability: Not on file      Current Medications:     Current Outpatient Medications   Medication Sig Dispense Refill   • Azelastine HCl 137 MCG/SPRAY SOLN USE 1 SPRAY IN EACH NOSTRILTWICE DAILY 60 mL 3   • naproxen (NAPROSYN) 500 mg tablet Take 1 tablet (500 mg total) by mouth 2 (two) times a day as needed for moderate pain 90 tablet 3   • tadalafil (CIALIS) 20 MG tablet TAKE 1 TABLET DAILY AS     NEEDED FOR ERECTILE        DYSFUNCTION. TAKE 1 HOUR   PRIOR TO ACTIVITY 30 tablet 3   • tirzepatide (Zepbound) 2.5 mg/0.5 mL auto-injector Inject 0.5 mL (2.5 mg total) under the skin once a week for 28 days 2 mL 0     No current facility-administered medications for this visit.      Allergies:     Allergies   Allergen Reactions   • Seasonal Ic [Cholestatin] Itching     Runny eyes and a cough sometimes      Physical Exam:     /74   Pulse 61   Resp 16   Ht 5' 9\" (1.753 m)   Wt 132 kg (291 lb)   SpO2 97%   BMI 42.97 kg/m²     Physical Exam  Vitals and nursing note reviewed.   Constitutional:       General: He is not in acute distress.     Appearance: He is well-developed. He is obese.   HENT:      Head: Normocephalic and atraumatic.      Right Ear: Tympanic membrane and ear canal normal.      Left Ear: Tympanic membrane and ear canal normal.   Eyes:      Conjunctiva/sclera: Conjunctivae normal.   Cardiovascular:      Rate and Rhythm: Normal " rate and regular rhythm.      Heart sounds: No murmur heard.  Pulmonary:      Effort: Pulmonary effort is normal. No respiratory distress.      Breath sounds: Normal breath sounds.   Abdominal:      Palpations: Abdomen is soft.      Tenderness: There is no abdominal tenderness.   Musculoskeletal:      Cervical back: Neck supple.      Right lower leg: No edema.      Left lower leg: No edema.   Lymphadenopathy:      Cervical: Cervical adenopathy present.      Left cervical: Posterior cervical adenopathy present.   Neurological:      Mental Status: He is alert.   Psychiatric:         Mood and Affect: Mood normal.         Behavior: Behavior normal.          Lea Reyes, MD  [facility]

## 2024-04-19 ENCOUNTER — TELEPHONE (OUTPATIENT)
Dept: INTERNAL MEDICINE CLINIC | Facility: CLINIC | Age: 69
End: 2024-04-19

## 2024-04-23 NOTE — TELEPHONE ENCOUNTER
PA for Zepbound    Submitted via    []CMM-KEY   [x][employer] ID # [id number]  []Faxed to plan   []Other website   []Phone call Case ID #     Office notes sent, clinical questions answered. Awaiting determination    Turnaround time for your insurance to make a decision on your Prior Authorization can take 7-21 business days.

## 2024-04-24 NOTE — TELEPHONE ENCOUNTER
PA for Zepbound Approved     Date(s) approved 4- - 12-      Patient advised by [x] [employer] Message                      [] Phone call       Pharmacy advised by [x]Fax                                     []Phone call    Approval letter scanned into Media Yes

## 2024-04-28 DIAGNOSIS — J30.2 SEASONAL ALLERGIES: ICD-10-CM

## 2024-04-28 RX ORDER — AZELASTINE HYDROCHLORIDE 137 UG/1
1 SPRAY, METERED NASAL 2 TIMES DAILY
Qty: 60 ML | Refills: 0 | Status: SHIPPED | OUTPATIENT
Start: 2024-04-28

## 2024-05-07 DIAGNOSIS — E66.01 MORBID OBESITY WITH BMI OF 40.0-44.9, ADULT (HCC): Primary | ICD-10-CM

## 2024-05-07 RX ORDER — TIRZEPATIDE 5 MG/.5ML
5 INJECTION, SOLUTION SUBCUTANEOUS WEEKLY
Qty: 2 ML | Refills: 0 | Status: SHIPPED | OUTPATIENT
Start: 2024-05-07

## 2024-05-09 DIAGNOSIS — G89.29 CHRONIC BILATERAL LOW BACK PAIN WITHOUT SCIATICA: ICD-10-CM

## 2024-05-09 DIAGNOSIS — N52.9 ERECTILE DYSFUNCTION, UNSPECIFIED ERECTILE DYSFUNCTION TYPE: ICD-10-CM

## 2024-05-09 DIAGNOSIS — M54.50 CHRONIC BILATERAL LOW BACK PAIN WITHOUT SCIATICA: ICD-10-CM

## 2024-05-09 DIAGNOSIS — M25.511 PAIN IN JOINT OF RIGHT SHOULDER: ICD-10-CM

## 2024-05-10 RX ORDER — TADALAFIL 20 MG/1
20 TABLET ORAL DAILY PRN
Qty: 30 TABLET | Refills: 3 | Status: SHIPPED | OUTPATIENT
Start: 2024-05-10

## 2024-05-10 RX ORDER — NAPROXEN 500 MG/1
500 TABLET ORAL 2 TIMES DAILY PRN
Qty: 180 TABLET | Refills: 1 | Status: SHIPPED | OUTPATIENT
Start: 2024-05-10

## 2024-06-11 ENCOUNTER — TELEPHONE (OUTPATIENT)
Age: 69
End: 2024-06-11

## 2024-06-11 NOTE — TELEPHONE ENCOUNTER
Caller: Patient     Doctor: SHRUTHI    Reason for call: Called to make appt with SPA. He was given ortho number to call. Provided the correct number and he will call in the morning.     Call back#:     English

## 2024-06-21 ENCOUNTER — CONSULT (OUTPATIENT)
Dept: PAIN MEDICINE | Facility: CLINIC | Age: 69
End: 2024-06-21
Payer: COMMERCIAL

## 2024-06-21 VITALS
BODY MASS INDEX: 42.95 KG/M2 | HEART RATE: 60 BPM | WEIGHT: 290 LBS | HEIGHT: 69 IN | SYSTOLIC BLOOD PRESSURE: 134 MMHG | DIASTOLIC BLOOD PRESSURE: 84 MMHG

## 2024-06-21 DIAGNOSIS — M48.061 SPINAL STENOSIS OF LUMBAR REGION, UNSPECIFIED WHETHER NEUROGENIC CLAUDICATION PRESENT: ICD-10-CM

## 2024-06-21 DIAGNOSIS — M54.40 LOW BACK PAIN WITH SCIATICA, SCIATICA LATERALITY UNSPECIFIED, UNSPECIFIED BACK PAIN LATERALITY, UNSPECIFIED CHRONICITY: Primary | ICD-10-CM

## 2024-06-21 DIAGNOSIS — M47.816 LUMBAR SPONDYLOSIS: ICD-10-CM

## 2024-06-21 DIAGNOSIS — M54.16 LUMBAR RADICULOPATHY: ICD-10-CM

## 2024-06-21 PROCEDURE — 99204 OFFICE O/P NEW MOD 45 MIN: CPT | Performed by: ANESTHESIOLOGY

## 2024-06-21 RX ORDER — METHYLPREDNISOLONE 4 MG/1
TABLET ORAL
Qty: 1 EACH | Refills: 0 | Status: SHIPPED | OUTPATIENT
Start: 2024-06-21

## 2024-06-21 NOTE — PROGRESS NOTES
Assessment  1. Low back pain with sciatica, sciatica laterality unspecified, unspecified back pain laterality, unspecified chronicity    2. Lumbar spondylosis    3. Spinal stenosis of lumbar region, unspecified whether neurogenic claudication present    4. Lumbar radiculopathy        Plan  68-year-old male with a history of thrombocytopenia and JAZMYN, self-referred, presenting for initial consultation regarding lumbosacral back pain that radiates into the posterior aspect of left lower extremity to the calf with associated subjective weakness.  The patient was moving heavy rocks in his yard May 4, 2024 and the pain began the very next day.  Last MRI of the lumbar spine was from 2016 demonstrating multilevel spondylosis with severe central stenosis at L3-4.  Moderate to severe central and recess stenosis at L4-5.  Severe left lateral recess stenosis at L5-S1.  No recent imaging of the lumbar spine.  The patient has not done any recent formal physical therapy or chiropractic treatment.  He does take ibuprofen and naproxen alternating initially with some relief, however now no relief.  The patient symptoms are consistent with lumbar radiculopathy and he does have weakness in the left quadriceps and hamstrings.  Positive straight leg raise on the left.    1.  I will order an MRI of the lumbar spine without contrast  2.  I will order physical therapy as I feel he would benefit from Ralph-based exercises  3.  I will prescribe Medrol Dosepak for pain relief  4.  I will follow-up with the patient in 6 weeks      My impressions and treatment recommendations were discussed in detail with the patient who verbalized understanding and had no further questions.  Discharge instructions were provided. I personally saw and examined the patient and I agree with the above discussed plan of care.    No orders of the defined types were placed in this encounter.    No orders of the defined types were placed in this  encounter.      History of Present Illness    Tashi Alexander is a 68 y.o. male with a history of thrombocytopenia and JAZMYN, self-referred, presenting for initial consultation regarding lumbosacral back pain that radiates into the posterior aspect of left lower extremity to the calf with associated subjective weakness.  The patient was moving heavy rocks in his yard May 4, 2024 and the pain began the very next day.  He denies any right lower extremity symptoms, bladder or bowel incontinence, or saddle anesthesia.  Last MRI of the lumbar spine was from 2016 demonstrating multilevel spondylosis with severe central stenosis at L3-4.  Moderate to severe central and recess stenosis at L4-5.  Severe left lateral recess stenosis at L5-S1.  No recent imaging of the lumbar spine.  The patient has not done any recent formal physical therapy or chiropractic treatment.  He does take ibuprofen and naproxen alternating initially with some relief, however now no relief.  The patient rates his pain an 8 out of 10 and the pain is constant.  The pain does not follow any particular pattern throughout the day.  The pain is described as dull and aching.  The pain is increased with standing, bending, sitting, walking, exercise, and relaxation.  He does find some relief with heat, ice, and a TENS unit.  He has found relief with injections in the past.  Last epidural steroid injection was in 2016.      Other than as stated above, the patient denies any interval changes in medications, medical condition, mental condition, symptoms, or allergies since the last office visit.    I have personally reviewed and/or updated the patient's past medical history, past surgical history, family history, social history, current medications, allergies, and vital signs today.     Review of Systems   Constitutional:  Negative for fever and unexpected weight change.   HENT:  Negative for trouble swallowing.    Eyes:  Negative for visual disturbance.    Respiratory:  Negative for shortness of breath and wheezing.    Cardiovascular:  Negative for chest pain and palpitations.   Gastrointestinal:  Negative for constipation, diarrhea, nausea and vomiting.   Endocrine: Negative for cold intolerance, heat intolerance and polydipsia.   Genitourinary:  Negative for difficulty urinating and frequency.   Musculoskeletal:  Negative for arthralgias, gait problem, joint swelling and myalgias.   Skin:  Negative for rash.   Neurological:  Negative for dizziness, seizures, syncope, weakness and headaches.   Hematological:  Does not bruise/bleed easily.   Psychiatric/Behavioral:  Negative for dysphoric mood.    All other systems reviewed and are negative.      Patient Active Problem List   Diagnosis    Dyslipidemia    Impaired fasting glucose    Sleep-related hypoventilation    Cervical lymphadenopathy    Hypersomnia    Morbid obesity with BMI of 40.0-44.9, adult (HCC)    Seasonal allergies    Erectile dysfunction    JAZMYN (obstructive sleep apnea)       Past Medical History:   Diagnosis Date    Allergic 2015    Anxiety June 2022    CPAP (continuous positive airway pressure) dependence     Eating disorder adulthood    Stress    Elevated serum creatinine     last assessed 01/16/2018    Morbid obesity (HCC) 01/11/2016    Obesity     Right hand pain 3/7/2022    Screening PSA (prostate specific antigen) 04/13/2018    Sebaceous cyst 6/12/2023    Sleep apnea     Tinnitus, left     last assessed 08/11/2014       Past Surgical History:   Procedure Laterality Date    COLONOSCOPY      JOINT REPLACEMENT  01/09/2018    OK ARTHROPLASTY GLENOHUMERAL JOINT TOTAL SHOULDER Right 01/09/2018    Procedure: TOTAL SHOULDER ARTHROPLASTY;  Surgeon: Robert Urban MD;  Location: [facility];  Service: Orthopedics    OK COLONOSCOPY FLX DX W/COLLJ SPEC WHEN PFRMD N/A 06/27/2017    Procedure: COLONOSCOPY;  Surgeon: Serge Blevins MD;  Location: [facility];  Service: Colorectal    OK EXCISION TUMOR SOFT  "TIS BACK/FLANK SUBQ 3 CM/> N/A 7/19/2023    Procedure: EXCISION  BIOPSY LESION/MASS BACK;  Surgeon: Ari Ahmadi MD;  Location: [facility];  Service: General       Family History   Problem Relation Age of Onset    Heart disease Mother         Heavy smoker    Diabetes Mother     Other Mother         cardiac disorder    Cancer Father         heavy smoker    Diabetes Father        Social History     Occupational History    Not on file   Tobacco Use    Smoking status: Never    Smokeless tobacco: Never   Vaping Use    Vaping status: Never Used   Substance and Sexual Activity    Alcohol use: Yes     Comment: Only during family gatherings    Drug use: No    Sexual activity: Yes     Partners: Female     Birth control/protection: Post-menopausal       Current Outpatient Medications on File Prior to Visit   Medication Sig    Azelastine HCl 137 MCG/SPRAY SOLN 1 spray by Each Nare route 2 (two) times a day    naproxen (NAPROSYN) 500 mg tablet Take 1 tablet (500 mg total) by mouth 2 (two) times a day as needed for moderate pain    tadalafil (CIALIS) 20 MG tablet Take 1 tablet (20 mg total) by mouth daily as needed for erectile dysfunction    tirzepatide (Zepbound) 5 mg/0.5 mL auto-injector Inject 0.5 mL (5 mg total) under the skin once a week     No current facility-administered medications on file prior to visit.       Allergies   Allergen Reactions    Seasonal Ic [Cholestatin] Itching     Runny eyes and a cough sometimes       Physical Exam    /84   Pulse 60   Ht 5' 9\" (1.753 m)   Wt 132 kg (290 lb)   BMI 42.83 kg/m²     Constitutional: normal, well developed, well nourished, alert, in no distress and non-toxic and no overt pain behavior.  Eyes: anicteric  HEENT: grossly intact  Neck: supple, symmetric, trachea midline and no masses   Pulmonary:even and unlabored  Cardiovascular:No edema or pitting edema present  Skin:Normal without rashes or lesions and well hydrated  Psychiatric:Mood and affect " appropriate  Neurologic:Cranial Nerves II-XII grossly intact  Musculoskeletal:antalgic gait.  Left lumbar paraspinals mildly tender to palpation from L4-S1.  Bilateral SI joints nontender to palpation.  Bilateral patellar and Achilles reflexes were 2 out of 4 and symmetrical.  Bilateral lower extremity strength 5 out of 5 in all muscle groups with the exception of left quadriceps and hamstrings which was 4-5.  Sensation intact to light touch in L3-S1 dermatomes bilaterally.  Positive straight leg raise on the left and negative on the right.  Negative Alberto's test bilaterally.    Imaging      Study Result    Narrative & Impression   MRI LUMBAR SPINE WITHOUT CONTRAST     INDICATION:  Back pain.  Right lower extremity radiculopathy.     COMPARISON:  None.     TECHNIQUE:  Sagittal T1, sagittal T2, sagittal inversion recovery, axial T1 and axial T2, coronal T2.       IMAGE QUALITY:  Examination is slightly limited as a result of motion artifact and patient body habitus.     FINDINGS:     ALIGNMENT:  There is mild left convex curvature the lumbar spine with its apex at L4.  No anterolisthesis, retrolisthesis, or compression deformity.     MARROW SIGNAL:  Heterogeneous marrow signal is noted throughout visualized osseous structures suggestive of red marrow reconversion.  Endplate degenerative signal is noted at multiple levels, most severe at L4-L5 and L5-S1.  Vertebral body hemangioma is   noted at T12.  No focal marrow signal lesion to suggest osseous malignancy.     DISTAL CORD AND CONUS:  Normal size and signal within the distal cord and conus.  The conus ends at the L1-L2 level.     PARASPINAL SOFT TISSUES:  Paraspinal soft tissues are unremarkable.     SACRUM:  Normal signal within the sacrum. No evidence of insufficiency or stress fracture.     LOWER THORACIC DISC SPACES:  Disc desiccation at multiple levels.  Left eccentric disc osteophyte complex is noted at T12-L1 resulting in mild central canal stenosis.      LUMBAR DISC SPACES:  Multilevel disc dislocation is noted.     L1-L2:  Mild facet degenerative changes.  No significant central canal or foraminal stenosis.     L2-L3:  Mild facet degenerative change.  No significant central canal or foraminal stenosis.     L3-L4:  Mild circumferential annular bulge.  Small focal dorsal central and right paracentral protrusion type disc herniation.  In conjunction with hypertrophic facet and posterior ligament degenerative changes there is severe central canal stenosis.  AP   dimension of the canal to the right of midline is diminished to as little as 4 mm.  There is crowding of cauda equina nerve roots.     L4-L5:  Loss of disc height with disc desiccation and circumferential disc osteophyte complex formation.  In conjunction with hypertrophic facet and posterior ligament degenerative changes there is moderate central canal stenosis with crowding of cauda   equina nerve roots.  Moderate to severe bilateral lateral recess narrowing is seen.  By foraminal stenosis is noted.     L5-S1:  Broad-based left paracentral and foraminal protrusion type disc herniation.  In conjunction with hypertrophic facet degenerative change there is severe left lateral recess narrowing with impingement upon descending left S1 nerve root.  Also noted   is moderate left foraminal stenosis with slight impingement upon exiting left L5 nerve root.     IMPRESSION:     Lumbar degenerative changes as described.     Severe central canal stenosis at L3-L4.     Moderate central canal stenosis and moderate to severe bilateral lateral recess narrowing at L4-L5.     Left lateral recess and left neural foramina narrowing at L4-L5 on the basis of broad-based left paracentral and foraminal protrusion correlate for left L5 and S1 radicular symptoms.     Markedly heterogeneous marrow signal throughout osseous structures suggestive of red marrow conversion.        Workstation performed: [id number]

## 2024-07-15 ENCOUNTER — EVALUATION (OUTPATIENT)
Dept: PHYSICAL THERAPY | Facility: CLINIC | Age: 69
End: 2024-07-15
Payer: COMMERCIAL

## 2024-07-15 DIAGNOSIS — M47.816 LUMBAR SPONDYLOSIS: ICD-10-CM

## 2024-07-15 DIAGNOSIS — M54.40 LOW BACK PAIN WITH SCIATICA, SCIATICA LATERALITY UNSPECIFIED, UNSPECIFIED BACK PAIN LATERALITY, UNSPECIFIED CHRONICITY: ICD-10-CM

## 2024-07-15 DIAGNOSIS — M48.061 SPINAL STENOSIS OF LUMBAR REGION, UNSPECIFIED WHETHER NEUROGENIC CLAUDICATION PRESENT: ICD-10-CM

## 2024-07-15 DIAGNOSIS — M54.16 LUMBAR RADICULOPATHY: ICD-10-CM

## 2024-07-15 PROCEDURE — 97110 THERAPEUTIC EXERCISES: CPT

## 2024-07-15 PROCEDURE — 97161 PT EVAL LOW COMPLEX 20 MIN: CPT

## 2024-07-15 PROCEDURE — 97140 MANUAL THERAPY 1/> REGIONS: CPT

## 2024-07-15 NOTE — PROGRESS NOTES
PT Evaluation    Today's date: 7/15/2024   Patient name: Tashi Alexander  : 1955  MRN: [id number]  Referring provider: Chirag Cabrera DO  Dx:   Encounter Diagnosis     ICD-10-CM    1. Low back pain with sciatica, sciatica laterality unspecified, unspecified back pain laterality, unspecified chronicity  M54.40 Ambulatory referral to Physical Therapy      2. Lumbar spondylosis  M47.816 Ambulatory referral to Physical Therapy      3. Spinal stenosis of lumbar region, unspecified whether neurogenic claudication present  M48.061 Ambulatory referral to Physical Therapy      4. Lumbar radiculopathy  M54.16 Ambulatory referral to Physical Therapy              Subjective Evaluation     History of Present Illness    Patient presents with c/o LBP with LLE sx extending to his toes. Pt reported that his sx started about 2 months ago while lifting rocks. Pt mentioned that he got an injection in his L/S in . Pt had an MRI of the L/S showing DDD and it also scheduled for another L/S MRI tomorrow. Pt reported that his sx increase with walking and going up/down stairs. Pt also reported that his sx decrease with taking Prednisone. Pt reported a MPH of RTC surgery.     Neuro signs: LLE sx   Bowel and bladder sxs: Normal  Red flags: No falls  Occupation: [employer]       Pain  At best pain rating: 3/10  At worst pain ratin/10  Location: LBP with LLE sx    Social Support  Lives with his wife in a 2 story home  Railing on stairs      Patient Goals  Patient goals for therapy: increase BLE strength and increasing core strength     STGs  1. Decrease pain by 20% in 2-4 weeks to improve standing tolerance.  2. Improve L/S ROM by 10 degrees in 2-4 weeks.   3. (I) with HEP within 2-4 weeks in order to improve PT outcomes.       LTGs  1. Decrease pain by 60% in 6-8 weeks.  2. Improve walking tolerance to >30 minutes in 6-8 weeks to perform community ambulation.   3. Perform job/dressing/showering activities  independently without pain in 6-8 weeks.    4. Improve stairs tolerance to 1 flight  in 8 weeks.   5. Improve FOTO to greater than or equal to 64.        Objective Measurements:      Lumbar ROM R L Strength Knee R L   Flex  80%  Flex 4+ 4   Ext 50%  Ext 4+ 4   SB 60% 50%      Rot 80% 80%              Hip A/PROM   Hip     Flex  /  / Flex 4+ 4   ER at 90   ER     IR at 90   IR     Abd   Abd     Ext   Ext             Knee A/PROM   Ankle     Flex   DF 5 4   Ext   PF 5 4+   Seated repeated L/S Flexion: increased LLE sx to his L thigh  Standing repeated L/S Flexion: L calf numbness  Standing repeated L/S Extension: L toe numbness  Repeated SKTC: No pain   Prone lying: NT  JOE:NT  Repeated PPU: NT  B spurling/SLR: negative    Anterior instability test: negative       Assessment:    Tashi Alexander is a pleasant 68 y.o. male who is attending skilled PT for LBP with LLE sx extending to his toes. Pt presented with probable L/S DDD/stenosis with a flexion based preference. Pt demonstrated deficits in L/S AROM and BLE strength. Pt scored a 47 on FOTO. Manual therapy focused on increased B hip PROM. Pt demonstrated decreased LLE sx and LBP after manual therapy. Pt was educated and demonstrated understanding of HEP, POC, and benefit of skilled PT. MHP was utilized post-treatment. Pt would benefit from further skilled PT in order to decrease pain, address deficits (ROM/MMT), help pt achieve goals, increase core strength to decrease stress on the L/S, and progress program as tolerated.     Thank you for the referral!    Plan  Patient would benefit from:Skilled physical therapy  Planned therapy interventions: manual therapy, neuromuscular re-education, stretching, strengthening, therapeutic activities, therapeutic exercise, patient education, home exercise program, modalities to decrease pain, and activity modification.    Frequency: 2x week  Duration in weeks: 8  Treatment plan discussed with: patient     Goals: Patient's goal is to  "increase BLE/core strength    Precautions: RTC surgery  Dx L/S DDD/stenosis (flexion based preference)    HEP: [id number]    Daily Treatment Diary     Manuals 7/15/2024        B hip PROM MS                                   Ther Ex         Seated L/S flexion str 10x        SKTC 5\"x10        TA bracing 5\"x10        LTR 5\"x10                                   R. bike         Pt Edu HEP/POC        Neuro Re-ed         SLR         Bridges          Abd/Oblique isos with ball                           Ther Activity                                    Gait Training                           Modalities         P 10'                        "

## 2024-07-16 ENCOUNTER — HOSPITAL ENCOUNTER (OUTPATIENT)
Dept: MRI IMAGING | Facility: HOSPITAL | Age: 69
Discharge: HOME | End: 2024-07-16
Attending: ANESTHESIOLOGY
Payer: COMMERCIAL

## 2024-07-16 ENCOUNTER — TELEPHONE (OUTPATIENT)
Age: 69
End: 2024-07-16

## 2024-07-16 DIAGNOSIS — M48.061 SPINAL STENOSIS OF LUMBAR REGION, UNSPECIFIED WHETHER NEUROGENIC CLAUDICATION PRESENT: ICD-10-CM

## 2024-07-16 DIAGNOSIS — M54.16 LUMBAR RADICULOPATHY: ICD-10-CM

## 2024-07-16 PROCEDURE — 72148 MRI LUMBAR SPINE W/O DYE: CPT

## 2024-07-16 NOTE — TELEPHONE ENCOUNTER
Caller: mustapha    Doctor: cooper    Reason for call: pt is calling to get a procedure for his low back.    Call back#: [phone]

## 2024-07-16 NOTE — TELEPHONE ENCOUNTER
Looks like he just had his MRI completed today.  Attempted to call the patient and left a detailed mom in regards to obtaining the results in 7 to 10 days and then we will contact him after JW reviews and then we will notify him of the results with next course of treatment.

## 2024-07-18 ENCOUNTER — OFFICE VISIT (OUTPATIENT)
Dept: PHYSICAL THERAPY | Facility: CLINIC | Age: 69
End: 2024-07-18
Payer: COMMERCIAL

## 2024-07-18 DIAGNOSIS — M47.816 LUMBAR SPONDYLOSIS: ICD-10-CM

## 2024-07-18 DIAGNOSIS — M54.16 LUMBAR RADICULOPATHY: ICD-10-CM

## 2024-07-18 DIAGNOSIS — M54.40 LOW BACK PAIN WITH SCIATICA, SCIATICA LATERALITY UNSPECIFIED, UNSPECIFIED BACK PAIN LATERALITY, UNSPECIFIED CHRONICITY: Primary | ICD-10-CM

## 2024-07-18 DIAGNOSIS — M48.061 SPINAL STENOSIS OF LUMBAR REGION, UNSPECIFIED WHETHER NEUROGENIC CLAUDICATION PRESENT: ICD-10-CM

## 2024-07-18 PROCEDURE — 97110 THERAPEUTIC EXERCISES: CPT

## 2024-07-18 PROCEDURE — 97140 MANUAL THERAPY 1/> REGIONS: CPT

## 2024-07-18 NOTE — PROGRESS NOTES
"Daily Note     Today's date: 2024  Patient name: Tashi Alexander  : 1955  MRN: [id number]  Referring provider: Chirag Cabrera DO  Dx:   Encounter Diagnosis     ICD-10-CM    1. Low back pain with sciatica, sciatica laterality unspecified, unspecified back pain laterality, unspecified chronicity  M54.40       2. Lumbar spondylosis  M47.816       3. Spinal stenosis of lumbar region, unspecified whether neurogenic claudication present  M48.061       4. Lumbar radiculopathy  M54.16           Start Time: 1630  Stop Time: 1710  Total time in clinic (min): 40 minutes    Subjective: Pt reported decreased pain after his IE but noted that he had increased pain today, possibly from walking/performing car transfers.       Objective: See treatment diary below      Assessment: Tolerated treatment well. Program started with the R. Bike. Program focused on increasing L/S ROM and increasing BLE/core strength. Pt was challenged with all exercises. Manual therapy focused on increasing B hip PROM to decrease stress on the L/S. MHP was utilized post-treatment. Patient would benefit from continued PT      Plan: Continue per plan of care.      Goals: Patient's goal is to increase BLE/core strength    Precautions: RTC surgery  Dx L/S DDD/stenosis (flexion based preference)    HEP: [id number]    Daily Treatment Diary     Manuals 7/15/2024 7/18       B hip PROM MS MS                                  Ther Ex         Seated L/S flexion str 10x 5\"x10 with ball       SKTC 5\"x10 5\"x10       TA bracing 5\"x10 5\"x10       LTR 5\"x10 5\"x10                                  R. bike  5'       Pt Edu HEP/POC        Neuro Re-ed         SLR  10x       Bridges   5\"x10       Abd/Oblique isos with ball                           Ther Activity                                    Gait Training                           Modalities         MHP 10' 10'                       "

## 2024-07-22 ENCOUNTER — APPOINTMENT (OUTPATIENT)
Dept: PHYSICAL THERAPY | Facility: CLINIC | Age: 69
End: 2024-07-22
Payer: COMMERCIAL

## 2024-07-24 DIAGNOSIS — M54.16 LUMBAR RADICULOPATHY: Primary | ICD-10-CM

## 2024-07-24 NOTE — TELEPHONE ENCOUNTER
Patient has multilevel arthritis and degenerative disc disease with varying degrees of central and foraminal stenosis most severe at L4-5 and L5-S1.  Can offer the patient a left L4 and L5 TFESI

## 2024-07-24 NOTE — TELEPHONE ENCOUNTER
Caller: Tashi    Doctor: Rick    Reason for call: patient calling for update he wants to schedule procedure Asap please advise MRI results are ready.    Call back#: [phone]

## 2024-07-24 NOTE — TELEPHONE ENCOUNTER
S/w the patient and reviewed. He was agreeable in having the injection. He denies taking blood thinning medications.  Please order and schedule. Thanks

## 2024-07-24 NOTE — TELEPHONE ENCOUNTER
Called patient scheduled procedure. Reviewed all instructions by phone upon scheduling  Mailed copy to home     Patient on cancellation list

## 2024-07-25 ENCOUNTER — APPOINTMENT (OUTPATIENT)
Dept: PHYSICAL THERAPY | Facility: CLINIC | Age: 69
End: 2024-07-25
Payer: COMMERCIAL

## 2024-07-29 ENCOUNTER — APPOINTMENT (OUTPATIENT)
Dept: PHYSICAL THERAPY | Facility: CLINIC | Age: 69
End: 2024-07-29
Payer: COMMERCIAL

## 2024-08-02 ENCOUNTER — OFFICE VISIT (OUTPATIENT)
Dept: PHYSICAL THERAPY | Facility: CLINIC | Age: 69
End: 2024-08-02
Payer: COMMERCIAL

## 2024-08-02 DIAGNOSIS — M54.16 LUMBAR RADICULOPATHY: ICD-10-CM

## 2024-08-02 DIAGNOSIS — M48.061 SPINAL STENOSIS OF LUMBAR REGION, UNSPECIFIED WHETHER NEUROGENIC CLAUDICATION PRESENT: ICD-10-CM

## 2024-08-02 DIAGNOSIS — M47.816 LUMBAR SPONDYLOSIS: ICD-10-CM

## 2024-08-02 DIAGNOSIS — M54.40 LOW BACK PAIN WITH SCIATICA, SCIATICA LATERALITY UNSPECIFIED, UNSPECIFIED BACK PAIN LATERALITY, UNSPECIFIED CHRONICITY: Primary | ICD-10-CM

## 2024-08-02 PROCEDURE — 97140 MANUAL THERAPY 1/> REGIONS: CPT | Performed by: PHYSICAL THERAPIST

## 2024-08-02 PROCEDURE — 97110 THERAPEUTIC EXERCISES: CPT | Performed by: PHYSICAL THERAPIST

## 2024-08-02 NOTE — PROGRESS NOTES
"Daily Note     Today's date: 2024  Patient name: Tashi Alexander  : 1955  MRN: [id number]  Referring provider: Chirag Cabrera DO  Dx:   Encounter Diagnosis     ICD-10-CM    1. Low back pain with sciatica, sciatica laterality unspecified, unspecified back pain laterality, unspecified chronicity  M54.40       2. Lumbar spondylosis  M47.816       3. Spinal stenosis of lumbar region, unspecified whether neurogenic claudication present  M48.061       4. Lumbar radiculopathy  M54.16                      Subjective: Pt notes that he is not feeling well today. He reports that he went into work two days this week, which has contributed to his symptoms.      Objective: See treatment diary below      Assessment: Tolerated treatment well. Patient demonstrated fatigue post treatment and would benefit from continued PT. Progressed with additional core based activities with visible muscle fatigue. Educated and encouraged regular performance for lumbar flexion exercises during HEP. Assess tolerance NV.       Plan: Continue per plan of care.  Progress treatment as tolerated.       Goals: Patient's goal is to increase BLE/core strength    Precautions: RTC surgery  Dx L/S DDD/stenosis (flexion based preference)    HEP: [id number]    Daily Treatment Diary     Manuals 7/15/2024 7/18 8/2      B hip PROM MS MS AR                                 Ther Ex         Seated L/S flexion str 10x 5\"x10 with ball 5\"x10 w/ball      SKTC 5\"x10 5\"x10 5\"x10      TA bracing 5\"x10 5\"x10 5\"x10      LTR 5\"x10 5\"x10 5\"x10                                 R. bike  5' 5'      Pt Edu HEP/POC        Neuro Re-ed         SLR  10x 10x      Bridges   5\"x10 5\"x10      Abd/Oblique isos with ball   5\"x10                        Ther Activity                                    Gait Training                           Modalities         P 10' 10' NP-PT Error                        "

## 2024-08-06 ENCOUNTER — OFFICE VISIT (OUTPATIENT)
Dept: PHYSICAL THERAPY | Facility: CLINIC | Age: 69
End: 2024-08-06
Payer: COMMERCIAL

## 2024-08-06 DIAGNOSIS — M54.16 LUMBAR RADICULOPATHY: ICD-10-CM

## 2024-08-06 DIAGNOSIS — M47.816 LUMBAR SPONDYLOSIS: ICD-10-CM

## 2024-08-06 DIAGNOSIS — M54.40 LOW BACK PAIN WITH SCIATICA, SCIATICA LATERALITY UNSPECIFIED, UNSPECIFIED BACK PAIN LATERALITY, UNSPECIFIED CHRONICITY: Primary | ICD-10-CM

## 2024-08-06 DIAGNOSIS — M48.061 SPINAL STENOSIS OF LUMBAR REGION, UNSPECIFIED WHETHER NEUROGENIC CLAUDICATION PRESENT: ICD-10-CM

## 2024-08-06 PROCEDURE — 97112 NEUROMUSCULAR REEDUCATION: CPT

## 2024-08-06 PROCEDURE — 97110 THERAPEUTIC EXERCISES: CPT

## 2024-08-06 NOTE — PROGRESS NOTES
"Daily Note     Today's date: 2024  Patient name: Tashi Alexander  : 1955  MRN: [id number]  Referring provider: Chirag Cabrera DO  Dx:   Encounter Diagnosis     ICD-10-CM    1. Low back pain with sciatica, sciatica laterality unspecified, unspecified back pain laterality, unspecified chronicity  M54.40       2. Lumbar spondylosis  M47.816       3. Spinal stenosis of lumbar region, unspecified whether neurogenic claudication present  M48.061       4. Lumbar radiculopathy  M54.16           Start Time: 0800  Stop Time: 842  Total time in clinic (min): 42 minutes    Subjective: Pt reported that his back has been feeling worse. Pt noted that his LBP has been ranging between 4-8/10.       Objective: See treatment diary below      Assessment: Tolerated treatment well. Program started with his seated L/S flexion str due to pt's increased sx. Manual therapy focused on increasing B hip flexibility in order to decrease stress on the L/S. Pt was educated on performing HEP, especially when his sx increase outside of therapy. Pt tolerated increased reps with abd iso well. MHP was utilized post-treatment. Patient would benefit from continued PT      Plan: Continue per plan of care.      Goals: Patient's goal is to increase BLE/core strength    Precautions: RTC surgery  Dx L/S DDD/stenosis (flexion based preference)    HEP: [id number]    Daily Treatment Diary     Manuals 7/15/2024 7/18 8/2 8/6     B hip PROM MS MS AR MS                                Ther Ex         Seated L/S flexion str 10x 5\"x10 with ball 5\"x10 w/ball 5\"x10 with ball     SKTC 5\"x10 5\"x10 5\"x10 5\"x10     TA bracing 5\"x10 5\"x10 5\"x10      LTR 5\"x10 5\"x10 5\"x10 5\"x10                                R. bike  5' 5' NT     Pt Edu HEP/POC        Neuro Re-ed         SLR  10x 10x 10x     Bridges   5\"x10 5\"x10 5\"x10     Abd/Oblique isos with ball   5\"x10 5\"x20/  5\"x10                       Ther Activity                                    Gait Training              "              Modalities         MHP 10' 10' NP-PT Error 10'

## 2024-08-07 ENCOUNTER — HOSPITAL ENCOUNTER (OUTPATIENT)
Dept: RADIOLOGY | Facility: CLINIC | Age: 69
Discharge: HOME | End: 2024-08-07
Payer: COMMERCIAL

## 2024-08-07 VITALS
HEART RATE: 57 BPM | OXYGEN SATURATION: 93 % | DIASTOLIC BLOOD PRESSURE: 90 MMHG | SYSTOLIC BLOOD PRESSURE: 154 MMHG | TEMPERATURE: 97.7 F | RESPIRATION RATE: 20 BRPM

## 2024-08-07 DIAGNOSIS — E66.01 MORBID OBESITY WITH BMI OF 40.0-44.9, ADULT (HCC): ICD-10-CM

## 2024-08-07 DIAGNOSIS — M54.16 LUMBAR RADICULOPATHY: ICD-10-CM

## 2024-08-07 PROCEDURE — 64483 NJX AA&/STRD TFRM EPI L/S 1: CPT | Performed by: ANESTHESIOLOGY

## 2024-08-07 PROCEDURE — 64484 NJX AA&/STRD TFRM EPI L/S EA: CPT | Performed by: ANESTHESIOLOGY

## 2024-08-07 RX ORDER — PAPAVERINE HCL 150 MG
15 CAPSULE, EXTENDED RELEASE ORAL ONCE
Status: COMPLETED | OUTPATIENT
Start: 2024-08-07 | End: 2024-08-07

## 2024-08-07 RX ORDER — TIRZEPATIDE 5 MG/.5ML
5 INJECTION, SOLUTION SUBCUTANEOUS WEEKLY
Qty: 2 ML | Refills: 0 | Status: SHIPPED | OUTPATIENT
Start: 2024-08-07

## 2024-08-07 RX ADMIN — DEXAMETHASONE SODIUM PHOSPHATE 15 MG: 10 INJECTION, SOLUTION INTRAMUSCULAR; INTRAVENOUS at 13:28

## 2024-08-07 RX ADMIN — LIDOCAINE HYDROCHLORIDE 2 ML: 20 INJECTION, SOLUTION EPIDURAL; INFILTRATION; INTRACAUDAL; PERINEURAL at 13:28

## 2024-08-07 RX ADMIN — IOHEXOL 2 ML: 300 INJECTION, SOLUTION INTRAVENOUS at 13:27

## 2024-08-07 NOTE — DISCHARGE INSTRUCTIONS
Epidural Steroid Injection   WHAT YOU NEED TO KNOW:   An epidural steroid injection (LAWSON) is a procedure to inject steroid medicine into the epidural space. The epidural space is between your spinal cord and vertebrae. Steroids reduce inflammation and fluid buildup in your spine that may be causing pain. You may be given pain medicine along with the steroids.          ACTIVITY  Do not drive or operate machinery today.  No strenuous activity today - bending, lifting, etc.  You may resume normal activites starting tomorrow - start slowly and as tolerated.  You may shower today, but no tub baths or hot tubs.  You may have numbness for several hours from the local anesthetic. Please use caution and common sense, especially with weight-bearing activities.    CARE OF THE INJECTION SITE  If you have soreness or pain, apply ice to the area today (20 minutes on/20 minutes off).  Starting tomorrow, you may use warm, moist heat or ice if needed.  You may have an increase or change in your discomfort for 36-48 hours after your treatment.  Apply ice and continue with any pain medication you have been prescribed.  Notify the [facility] if you have any of the following: redness, drainage, swelling, headache, stiff neck or fever above 100°F.    SPECIAL INSTRUCTIONS  Our office will contact you in approximately 14 days for a progress report.    MEDICATIONS  Continue to take all routine medications.  Our office may have instructed you to hold some medications.    As no general anesthesia was used in today's procedure, you should not experience any side effects related to anesthesia.     If you are diabetic, the steroids used in today's injection may temporarily increase your blood sugar levels after the first few days after your injection. Please keep a close eye on your sugars and alert the doctor who manages your diabetes if your sugars are significantly high from your baseline or you are symptomatic.     If you have a  problem specifically related to your procedure, please call our office at [phone].  Problems not related to your procedure should be directed to your primary care physician.

## 2024-08-07 NOTE — H&P
History of Present Illness: The patient is a 68 y.o. male who presents with complaints of low back and leg pain.    Past Medical History:   Diagnosis Date    Allergic 2015    Anxiety June 2022    CPAP (continuous positive airway pressure) dependence     Eating disorder adulthood    Stress    Elevated serum creatinine     last assessed 01/16/2018    Morbid obesity (HCC) 01/11/2016    Obesity     Right hand pain 3/7/2022    Screening PSA (prostate specific antigen) 04/13/2018    Sebaceous cyst 6/12/2023    Sleep apnea     Tinnitus, left     last assessed 08/11/2014       Past Surgical History:   Procedure Laterality Date    COLONOSCOPY      JOINT REPLACEMENT  01/09/2018    MI ARTHROPLASTY GLENOHUMERAL JOINT TOTAL SHOULDER Right 01/09/2018    Procedure: TOTAL SHOULDER ARTHROPLASTY;  Surgeon: Robert Urban MD;  Location: [facility];  Service: Orthopedics    MI COLONOSCOPY FLX DX W/COLLJ SPEC WHEN PFRMD N/A 06/27/2017    Procedure: COLONOSCOPY;  Surgeon: Serge Blevins MD;  Location: [facility];  Service: Colorectal    MI EXCISION TUMOR SOFT TIS BACK/FLANK SUBQ 3 CM/> N/A 7/19/2023    Procedure: EXCISION  BIOPSY LESION/MASS BACK;  Surgeon: Ari Ahmadi MD;  Location: [facility];  Service: General         Current Outpatient Medications:     Azelastine HCl 137 MCG/SPRAY SOLN, 1 spray by Each Nare route 2 (two) times a day, Disp: 60 mL, Rfl: 0    methocarbamol (ROBAXIN) 500 mg tablet, Take 1 tablet (500 mg total) by mouth 3 (three) times a day as needed for muscle spasms, Disp: 90 tablet, Rfl: 0    methylPREDNISolone 4 MG tablet therapy pack, Use as directed on package, Disp: 1 each, Rfl: 0    naproxen (NAPROSYN) 500 mg tablet, Take 1 tablet (500 mg total) by mouth 2 (two) times a day as needed for moderate pain, Disp: 180 tablet, Rfl: 1    tadalafil (CIALIS) 20 MG tablet, Take 1 tablet (20 mg total) by mouth daily as needed for erectile dysfunction, Disp: 30 tablet, Rfl: 3    tirzepatide (Zepbound) 5 mg/0.5 mL  auto-injector, Inject 0.5 mL (5 mg total) under the skin once a week, Disp: 2 mL, Rfl: 0    Allergies   Allergen Reactions    Seasonal Ic [Cholestatin] Itching     Runny eyes and a cough sometimes       Physical Exam:   Vitals:    08/07/24 1306   BP: 152/89   Pulse: 62   Resp: 18   Temp: 97.7 °F (36.5 °C)   SpO2: 93%     General: Awake, Alert, Oriented x 3, Mood and affect appropriate  Respiratory: Respirations even and unlabored  Cardiovascular: Peripheral pulses intact; no edema  Musculoskeletal Exam: antalgic gait    ASA Score: 2         Assessment:   1. Lumbar radiculopathy        Plan: left L4 and L5 TFESI

## 2024-08-09 ENCOUNTER — APPOINTMENT (OUTPATIENT)
Dept: PHYSICAL THERAPY | Facility: CLINIC | Age: 69
End: 2024-08-09
Payer: COMMERCIAL

## 2024-08-13 ENCOUNTER — OFFICE VISIT (OUTPATIENT)
Dept: PHYSICAL THERAPY | Facility: CLINIC | Age: 69
End: 2024-08-13
Payer: COMMERCIAL

## 2024-08-13 DIAGNOSIS — M47.816 LUMBAR SPONDYLOSIS: ICD-10-CM

## 2024-08-13 DIAGNOSIS — M54.16 LUMBAR RADICULOPATHY: ICD-10-CM

## 2024-08-13 DIAGNOSIS — M54.40 LOW BACK PAIN WITH SCIATICA, SCIATICA LATERALITY UNSPECIFIED, UNSPECIFIED BACK PAIN LATERALITY, UNSPECIFIED CHRONICITY: Primary | ICD-10-CM

## 2024-08-13 DIAGNOSIS — M48.061 SPINAL STENOSIS OF LUMBAR REGION, UNSPECIFIED WHETHER NEUROGENIC CLAUDICATION PRESENT: ICD-10-CM

## 2024-08-13 PROCEDURE — 97112 NEUROMUSCULAR REEDUCATION: CPT

## 2024-08-13 PROCEDURE — 97110 THERAPEUTIC EXERCISES: CPT

## 2024-08-13 NOTE — PROGRESS NOTES
"Daily Note     Today's date: 2024  Patient name: Tashi Alexander  : 1955  MRN: [id number]  Referring provider: Chirag Cabrera DO  Dx:   Encounter Diagnosis     ICD-10-CM    1. Low back pain with sciatica, sciatica laterality unspecified, unspecified back pain laterality, unspecified chronicity  M54.40       2. Lumbar spondylosis  M47.816       3. Spinal stenosis of lumbar region, unspecified whether neurogenic claudication present  M48.061       4. Lumbar radiculopathy  M54.16           Start Time: 0800  Stop Time: 0840  Total time in clinic (min): 40 minutes    Subjective: Pt reported that he got an injection of the L4-5 on , which helped. Pt noted still having weakness. Pt noted increased core strength.       Objective: See treatment diary below      Assessment: Tolerated treatment well. Pt's program started with the R. Bike. Pt demonstrated improved tolerance with exercises compared to last visit. Manual therapy focused on increasing B hip PROM. MHP was utilized post-treatment. Patient would benefit from continued PT      Plan: Continue per plan of care.      Goals: Patient's goal is to increase BLE/core strength    Precautions: RTC surgery  Dx L/S DDD/stenosis (flexion based preference)    HEP: [id number]    Daily Treatment Diary     Manuals 7/15/2024 7/18 8/2 8/6 8/13    B hip PROM MS MS AR MS MS                               Ther Ex         Seated L/S flexion str 10x 5\"x10 with ball 5\"x10 w/ball 5\"x10 with ball 5\"x10 with ball    SKTC 5\"x10 5\"x10 5\"x10 5\"x10 5\"x10    TA bracing 5\"x10 5\"x10 5\"x10      LTR 5\"x10 5\"x10 5\"x10 5\"x10 5\"x10                               R. bike  5' 5' NT 5'    Pt Edu HEP/POC        Neuro Re-ed         SLR  10x 10x 10x 10x    Bridges   5\"x10 5\"x10 5\"x10 5\"x10    Abd/Oblique isos with ball   5\"x10 5\"x20/  5\"x10 5\"x20/  5\"x10                      Ther Activity                                    Gait Training                           Modalities         [facility] 10' 10' NP-PT " Error 10' 10'

## 2024-08-16 ENCOUNTER — OFFICE VISIT (OUTPATIENT)
Dept: PHYSICAL THERAPY | Facility: CLINIC | Age: 69
End: 2024-08-16
Payer: COMMERCIAL

## 2024-08-16 DIAGNOSIS — M54.40 LOW BACK PAIN WITH SCIATICA, SCIATICA LATERALITY UNSPECIFIED, UNSPECIFIED BACK PAIN LATERALITY, UNSPECIFIED CHRONICITY: Primary | ICD-10-CM

## 2024-08-16 DIAGNOSIS — M48.061 SPINAL STENOSIS OF LUMBAR REGION, UNSPECIFIED WHETHER NEUROGENIC CLAUDICATION PRESENT: ICD-10-CM

## 2024-08-16 DIAGNOSIS — M47.816 LUMBAR SPONDYLOSIS: ICD-10-CM

## 2024-08-16 DIAGNOSIS — M54.16 LUMBAR RADICULOPATHY: ICD-10-CM

## 2024-08-16 PROCEDURE — 97112 NEUROMUSCULAR REEDUCATION: CPT

## 2024-08-16 PROCEDURE — 97110 THERAPEUTIC EXERCISES: CPT

## 2024-08-16 NOTE — PROGRESS NOTES
Re-Evaluation     Today's date: 2024  Patient name: Tashi Alexander  : 1955  MRN: [id number]  Referring provider: Chirag Cabrera DO  Dx:   Encounter Diagnosis     ICD-10-CM    1. Low back pain with sciatica, sciatica laterality unspecified, unspecified back pain laterality, unspecified chronicity  M54.40       2. Lumbar spondylosis  M47.816       3. Spinal stenosis of lumbar region, unspecified whether neurogenic claudication present  M48.061       4. Lumbar radiculopathy  M54.16           Start Time: 0800  Stop Time: 830  Total time in clinic (min): 30 minutes    Subjective Evaluation   : Pt reported that his LBP has been ranging between 2-8/10 and also reported a subjective improvement percentage of 40%.     History of Present Illness    IE: Patient presents with c/o LBP with LLE sx extending to his toes. Pt reported that his sx started about 2 months ago while lifting rocks. Pt mentioned that he got an injection in his L/S in . Pt had an MRI of the L/S showing DDD and it also scheduled for another L/S MRI tomorrow. Pt reported that his sx increase with walking and going up/down stairs. Pt also reported that his sx decrease with taking Prednisone. Pt reported a MPH of RTC surgery.     Neuro signs: LLE sx   Bowel and bladder sxs: Normal  Red flags: No falls  Occupation: [employer]       Pain  At best pain ratin/10  At worst pain ratin/10  Location: LBP with LLE sx    Social Support  Lives with his wife in a 2 story home  Railing on stairs      Patient Goals  Patient goals for therapy: increase BLE strength and increasing core strength     STGs  1. Decrease pain by 20% in 2-4 weeks to improve standing tolerance.-Met  2. Improve L/S ROM by 10% in 2-4 weeks.-Met  3. (I) with HEP within 2-4 weeks in order to improve PT outcomes.-Met      LTGs  1. Decrease pain by 60% in 6-8 weeks.-Met  2. Improve walking tolerance to >30 minutes in 6-8 weeks to perform community  "ambulation.-Partially Met (20 min)  3. Perform job/dressing/showering activities independently without pain in 6-8 weeks.-Partially Met  4. Improve stairs tolerance to 1 flight  in 8 weeks.-Met  5. Improve FOTO to greater than or equal to 64.-Met      Objective Measurements:      Lumbar ROM R L Strength Knee R L   Flex  90%  Flex 4+ 4+   Ext 60%  Ext 4+ 4   SB 75% 60%      Rot 90% 90%              Hip A/PROM   Hip     Flex  /  / Flex 4+ 4   ER at 90   ER     IR at 90   IR     Abd   Abd     Ext   Ext             Knee A/PROM   Ankle     Flex   DF 5 4+   Ext   PF 5 4+       Assessment:    Tashi Alexander is a pleasant 68 y.o. male who has attended 6 visits of skilled PT for LBP with LLE sx extending to his toes. Pt demonstrated improvements in LLE strength and L/S AROM. Pt scored a 65 on FOTO. Pt was educated and demonstrated understanding of program, POC, and benefit of skilled PT. Pt deferred modalities. Pt would benefit from further skilled PT in order to decrease pain, address deficits (ROM/MMT), help pt achieve goals, increase core strength to decrease stress on the L/S, and progress program as tolerated.     Thank you for the referral!    Plan  Patient would benefit from:Skilled physical therapy  Planned therapy interventions: manual therapy, neuromuscular re-education, stretching, strengthening, therapeutic activities, therapeutic exercise, patient education, home exercise program, modalities to decrease pain, and activity modification.    Frequency: 2x week  Duration in weeks: 8  Treatment plan discussed with: patient     Goals: Patient's goal is to increase BLE/core strength    Precautions: RTC surgery  Dx L/S DDD/stenosis (flexion based preference)    HEP: [id number]    Daily Treatment Diary     Manuals  7/18 8/2 8/6 8/13 8/16   B hip PROM  MS AR MS MS                               Ther Ex         Seated L/S flexion str  5\"x10 with ball 5\"x10 w/ball 5\"x10 with ball 5\"x10 with ball 5\"x10 with ball   SKTC  5\"x10 " "5\"x10 5\"x10 5\"x10 5\"x10   TA bracing  5\"x10 5\"x10      LTR  5\"x10 5\"x10 5\"x10 5\"x10 5\"x10                              RHao bike  5' 5' NT 5' 5'   Pt Edu         Neuro Re-ed         SLR  10x 10x 10x 10x 10x2   Bridges   5\"x10 5\"x10 5\"x10 5\"x10 5\"x10   Abd/Oblique isos with ball   5\"x10 5\"x20/  5\"x10 5\"x20/  5\"x10 5\"x20/  5\"x10                     Ther Activity                                    Gait Training                           Modalities         MHP  10' NP-PT Error 10' 10' Deferred                           "

## 2024-08-20 ENCOUNTER — OFFICE VISIT (OUTPATIENT)
Dept: PHYSICAL THERAPY | Facility: CLINIC | Age: 69
End: 2024-08-20
Payer: COMMERCIAL

## 2024-08-20 DIAGNOSIS — M54.16 LUMBAR RADICULOPATHY: ICD-10-CM

## 2024-08-20 DIAGNOSIS — M54.40 LOW BACK PAIN WITH SCIATICA, SCIATICA LATERALITY UNSPECIFIED, UNSPECIFIED BACK PAIN LATERALITY, UNSPECIFIED CHRONICITY: Primary | ICD-10-CM

## 2024-08-20 DIAGNOSIS — M47.816 LUMBAR SPONDYLOSIS: ICD-10-CM

## 2024-08-20 DIAGNOSIS — M48.061 SPINAL STENOSIS OF LUMBAR REGION, UNSPECIFIED WHETHER NEUROGENIC CLAUDICATION PRESENT: ICD-10-CM

## 2024-08-20 PROCEDURE — 97112 NEUROMUSCULAR REEDUCATION: CPT

## 2024-08-20 PROCEDURE — 97110 THERAPEUTIC EXERCISES: CPT

## 2024-08-20 NOTE — PROGRESS NOTES
"Daily Note     Today's date: 2024  Patient name: Tashi Alexander  : 1955  MRN: [id number]  Referring provider: Chirag Cabrera DO  Dx:   Encounter Diagnosis     ICD-10-CM    1. Low back pain with sciatica, sciatica laterality unspecified, unspecified back pain laterality, unspecified chronicity  M54.40       2. Lumbar spondylosis  M47.816       3. Spinal stenosis of lumbar region, unspecified whether neurogenic claudication present  M48.061       4. Lumbar radiculopathy  M54.16           Start Time: 0800  Stop Time: 828  Total time in clinic (min): 28 minutes    Subjective: Pt reported that his LBP was been ranging between 0-10/10 but noted that his increase in sx are less consistent.       Objective: See treatment diary below      Assessment: Tolerated treatment well. Program started with the R. Bike. Manual therapy focused on increasing B hip PROM. Seated PPT was added in order to increase L/S ROM prior to performing STS transfer. Pt was challenged with all exercises. Pt deferred modalities. Patient would benefit from continued PT      Plan: Continue per plan of care.      Goals: Patient's goal is to increase BLE/core strength    Precautions: RTC surgery  Dx L/S DDD/stenosis (flexion based preference)    HEP: [id number]    Daily Treatment Diary       Manuals    B hip PROM MS  AR MS MS                               Ther Ex         Seated L/S flexion str 5\"x10 with ball  5\"x10 w/ball 5\"x10 with ball 5\"x10 with ball 5\"x10 with ball   SKTC 5\"x10  5\"x10 5\"x10 5\"x10 5\"x10   TA bracing   5\"x10      LTR 5\"x10  5\"x10 5\"x10 5\"x10 5\"x10   Seated PPT 5\"x10                          R. bike 5'  5' NT 5' 5'   Pt Edu         Neuro Re-ed         SLR 10x2  10x 10x 10x 10x2   Bridges  10x2  5\"x10 5\"x10 5\"x10 5\"x10   Abd/Oblique isos with ball 5\"x20/  5\"x10  5\"x10 5\"x20/  5\"x10 5\"x20/  5\"x10 5\"x20/  5\"x10                     Ther Activity                                    Gait Training                "            Modalities         MHP Deferred  NP-PT Error 10' 10' Deferred

## 2024-08-21 ENCOUNTER — TELEPHONE (OUTPATIENT)
Dept: PAIN MEDICINE | Facility: CLINIC | Age: 69
End: 2024-08-21

## 2024-08-21 NOTE — TELEPHONE ENCOUNTER
Patient Reports    70     %     improvement post injection    Pain Level     /10  Pt is helping. Has spasms and pain is at 10.

## 2024-08-23 ENCOUNTER — OFFICE VISIT (OUTPATIENT)
Dept: PHYSICAL THERAPY | Facility: CLINIC | Age: 69
End: 2024-08-23
Payer: COMMERCIAL

## 2024-08-23 DIAGNOSIS — M54.16 LUMBAR RADICULOPATHY: ICD-10-CM

## 2024-08-23 DIAGNOSIS — M47.816 LUMBAR SPONDYLOSIS: ICD-10-CM

## 2024-08-23 DIAGNOSIS — M54.40 LOW BACK PAIN WITH SCIATICA, SCIATICA LATERALITY UNSPECIFIED, UNSPECIFIED BACK PAIN LATERALITY, UNSPECIFIED CHRONICITY: Primary | ICD-10-CM

## 2024-08-23 DIAGNOSIS — M48.061 SPINAL STENOSIS OF LUMBAR REGION, UNSPECIFIED WHETHER NEUROGENIC CLAUDICATION PRESENT: ICD-10-CM

## 2024-08-23 PROCEDURE — 97110 THERAPEUTIC EXERCISES: CPT

## 2024-08-23 NOTE — PROGRESS NOTES
"Daily Note     Today's date: 2024  Patient name: Tashi Alexander  : 1955  MRN: [id number]  Referring provider: Chirag Cabrera DO  Dx:   Encounter Diagnosis     ICD-10-CM    1. Low back pain with sciatica, sciatica laterality unspecified, unspecified back pain laterality, unspecified chronicity  M54.40       2. Lumbar spondylosis  M47.816       3. Spinal stenosis of lumbar region, unspecified whether neurogenic claudication present  M48.061       4. Lumbar radiculopathy  M54.16           Start Time: 0800  Stop Time: 0830  Total time in clinic (min): 30 minutes    Subjective: Pt reported that his lower back felt pretty good today.       Objective: See treatment diary below      Assessment: Tolerated treatment well. Program started with the LEOBARDO Nick. Manual therapy focused on increasing B hip flexibility in order to decrease stress on the L/S. Mini squats were added in order to increase BLE/core strength. Pt deferred modalities. Patient would benefit from continued PT    Billing reflects decreased one-on-one time.     Plan: Continue per plan of care.      Goals: Patient's goal is to increase BLE/core strength    Precautions: RTC surgery  Dx L/S DDD/stenosis (flexion based preference)    HEP: [id number]    Daily Treatment Diary       Manuals    B hip PROM MS MS  MS MS                               Ther Ex         Seated L/S flexion str 5\"x10 with ball 5\"x10 with ball  5\"x10 with ball 5\"x10 with ball 5\"x10 with ball   SKTC 5\"x10 5\"x10  5\"x10 5\"x10 5\"x10   TA bracing         LTR 5\"x10 5\"x10  5\"x10 5\"x10 5\"x10   Seated PPT 5\"x10 5\"x10                         LEOBARDO nick 5' 5'  NT 5' 5'   Pt Edu         Neuro Re-ed         SLR 10x2 10x2  10x 10x 10x2   Bridges  10x2 10x2  5\"x10 5\"x10 5\"x10   Abd/Oblique isos with ball 5\"x20/  5\"x10 5\"x20/  5\"x10  5\"x20/  5\"x10 5\"x20/  5\"x10 5\"x20/  5\"x10   Mini squats  10x                Ther Activity                                    Gait Training              "              Modalities         MHP Deferred Deferred  10' 10' Deferred

## 2024-08-24 DIAGNOSIS — J30.2 SEASONAL ALLERGIES: ICD-10-CM

## 2024-08-25 RX ORDER — AZELASTINE HYDROCHLORIDE 137 UG/1
SPRAY, METERED NASAL
Qty: 30 ML | Refills: 1 | Status: SHIPPED | OUTPATIENT
Start: 2024-08-25

## 2024-08-27 ENCOUNTER — OFFICE VISIT (OUTPATIENT)
Dept: PHYSICAL THERAPY | Facility: CLINIC | Age: 69
End: 2024-08-27
Payer: COMMERCIAL

## 2024-08-27 DIAGNOSIS — M47.816 LUMBAR SPONDYLOSIS: ICD-10-CM

## 2024-08-27 DIAGNOSIS — M54.16 LUMBAR RADICULOPATHY: ICD-10-CM

## 2024-08-27 DIAGNOSIS — M54.40 LOW BACK PAIN WITH SCIATICA, SCIATICA LATERALITY UNSPECIFIED, UNSPECIFIED BACK PAIN LATERALITY, UNSPECIFIED CHRONICITY: Primary | ICD-10-CM

## 2024-08-27 DIAGNOSIS — M48.061 SPINAL STENOSIS OF LUMBAR REGION, UNSPECIFIED WHETHER NEUROGENIC CLAUDICATION PRESENT: ICD-10-CM

## 2024-08-27 PROCEDURE — 97112 NEUROMUSCULAR REEDUCATION: CPT

## 2024-08-27 PROCEDURE — 97110 THERAPEUTIC EXERCISES: CPT

## 2024-08-27 NOTE — PROGRESS NOTES
"Daily Note     Today's date: 2024  Patient name: Tashi Alexander  : 1955  MRN: [id number]  Referring provider: Chirag Cabrera DO  Dx:   Encounter Diagnosis     ICD-10-CM    1. Low back pain with sciatica, sciatica laterality unspecified, unspecified back pain laterality, unspecified chronicity  M54.40       2. Lumbar spondylosis  M47.816       3. Spinal stenosis of lumbar region, unspecified whether neurogenic claudication present  M48.061       4. Lumbar radiculopathy  M54.16           Start Time: 0800  Stop Time: 08  Total time in clinic (min): 32 minutes    Subjective: Pt reported decreased muscle spasms and noted that things overall are improving. Pt also noted decreased LLE swelling. Pt's muscle spasms occur with taking a few steps after standing from a seated position.       Objective: See treatment diary below      Assessment: Tolerated treatment well. Program started with the R. Bike. Progressions focused on increasing BLE/core strength and improving standing tolerance. Manual therapy focused on increasing B hip flexibility in order to decrease stress on the L/S. Pt deferred modalities. Patient would benefit from continued PT      Plan: Continue per plan of care.      Goals: Patient's goal is to increase BLE/core strength    Precautions: RTC surgery  Dx L/S DDD/stenosis (flexion based preference)    HEP: [id number]    Daily Treatment Diary       Manuals    B hip PROM MS MS MS  MS                               Ther Ex         Seated L/S flexion str 5\"x10 with ball 5\"x10 with ball 5\"x10 with ball  5\"x10 with ball 5\"x10 with ball   SKTC 5\"x10 5\"x10 5\"x10  5\"x10 5\"x10   TA bracing         LTR 5\"x10 5\"x10 5\"x10  5\"x10 5\"x10   Seated PPT 5\"x10 5\"x10 5\"x10                        R. bike 5' 5' 5'  5' 5'   Pt Edu         Neuro Re-ed         SLR 10x2 10x2 10x2  10x 10x2   Bridges  10x2 10x2 10x2  5\"x10 5\"x10   Abd/Oblique isos with ball 5\"x20/  5\"x10 5\"x20/  5\"x10 5\"x20/  5\"x10  " "5\"x20/  5\"x10 5\"x20/  5\"x10   Mini squats  10x 10x2      Hip abd/ext   20x each      Pallof press   BTB 5\"x10      Leg press   NV      Ther Activity                                    Gait Training                           Modalities         MHP Deferred Deferred Deferred  10' Deferred                               "

## 2024-08-30 ENCOUNTER — OFFICE VISIT (OUTPATIENT)
Dept: PHYSICAL THERAPY | Facility: CLINIC | Age: 69
End: 2024-08-30
Payer: COMMERCIAL

## 2024-08-30 DIAGNOSIS — M54.40 LOW BACK PAIN WITH SCIATICA, SCIATICA LATERALITY UNSPECIFIED, UNSPECIFIED BACK PAIN LATERALITY, UNSPECIFIED CHRONICITY: Primary | ICD-10-CM

## 2024-08-30 DIAGNOSIS — M54.16 LUMBAR RADICULOPATHY: ICD-10-CM

## 2024-08-30 DIAGNOSIS — M48.061 SPINAL STENOSIS OF LUMBAR REGION, UNSPECIFIED WHETHER NEUROGENIC CLAUDICATION PRESENT: ICD-10-CM

## 2024-08-30 DIAGNOSIS — M47.816 LUMBAR SPONDYLOSIS: ICD-10-CM

## 2024-08-30 PROCEDURE — 97110 THERAPEUTIC EXERCISES: CPT

## 2024-08-30 PROCEDURE — 97112 NEUROMUSCULAR REEDUCATION: CPT

## 2024-08-30 NOTE — PROGRESS NOTES
"Daily Note     Today's date: 2024  Patient name: Tashi Alexander  : 1955  MRN: [id number]  Referring provider: Chirag Cabrera DO  Dx:   Encounter Diagnosis     ICD-10-CM    1. Low back pain with sciatica, sciatica laterality unspecified, unspecified back pain laterality, unspecified chronicity  M54.40       2. Lumbar spondylosis  M47.816       3. Spinal stenosis of lumbar region, unspecified whether neurogenic claudication present  M48.061       4. Lumbar radiculopathy  M54.16           Start Time: 0800  Stop Time: 08  Total time in clinic (min): 35 minutes    Subjective: Pt reported that he \"tweaked his back\" moving groceries. Pt noted still having decreased spasms.       Objective: See treatment diary below      Assessment: Tolerated treatment well. Program started with the R. Bike. Manual therapy focused on increasing B hip PROM and decreasing BLE nerve tension with nerve glides. Pt was educated on correct lifting mechanics (limit twisting) and HEP. Pt deferred modalities. Patient would benefit from continued PT      Plan: Continue per plan of care.      Goals: Patient's goal is to increase BLE/core strength    Precautions: RTC surgery  Dx L/S DDD/stenosis (flexion based preference)    HEP: [id number]    Daily Treatment Diary       Manuals    B hip PROM MS MS MS MS     LE nerve glides    MS                       Ther Ex         Seated L/S flexion str 5\"x10 with ball 5\"x10 with ball 5\"x10 with ball 5\"x10 with ball  5\"x10 with ball   SKTC 5\"x10 5\"x10 5\"x10 5\"x10  5\"x10   TA bracing         LTR 5\"x10 5\"x10 5\"x10 5\"x10  5\"x10   Seated PPT 5\"x10 5\"x10 5\"x10 5\"x10     LE nerve glide    10x B              R. bike 5' 5' 5' 5'  5'   Pt Edu         Neuro Re-ed         SLR 10x2 10x2 10x2 10x2  10x2   Bridges  10x2 10x2 10x2 10x2  5\"x10   Abd/Oblique isos with ball 5\"x20/  5\"x10 5\"x20/  5\"x10 5\"x20/  5\"x10 5\"x20/  5\"x10  5\"x20/  5\"x10   Mini squats  10x 10x2 10x2     Hip abd/ext   20x " "each 20x each     Pallof press   BTB 5\"x10 NT     Leg press   NV NV     Ther Activity                                    Gait Training                           Modalities         MHP Deferred Deferred Deferred Deferred  Deferred                                 "

## 2024-09-03 ENCOUNTER — TELEPHONE (OUTPATIENT)
Age: 69
End: 2024-09-03

## 2024-09-03 DIAGNOSIS — E66.01 MORBID OBESITY WITH BMI OF 40.0-44.9, ADULT (HCC): Primary | ICD-10-CM

## 2024-09-03 RX ORDER — TIRZEPATIDE 7.5 MG/.5ML
7.5 INJECTION, SOLUTION SUBCUTANEOUS WEEKLY
Qty: 2 ML | Refills: 0 | Status: SHIPPED | OUTPATIENT
Start: 2024-09-03

## 2024-09-03 NOTE — TELEPHONE ENCOUNTER
PA for Zepbound  NOT REQUIRED       Patient advised by          [x] MyChart Message  [] Phone call   []LMOM  []L/M to call office as no active Communication consent on file  []Unable to leave detailed message as VM not approved on Communication consent       Pharmacy advised by    [x]Fax  []Phone call

## 2024-09-05 ENCOUNTER — TELEPHONE (OUTPATIENT)
Age: 69
End: 2024-09-05

## 2024-09-05 DIAGNOSIS — U07.1 COVID-19: Primary | ICD-10-CM

## 2024-09-05 RX ORDER — NIRMATRELVIR AND RITONAVIR 300-100 MG
3 KIT ORAL 2 TIMES DAILY
Qty: 30 TABLET | Refills: 0 | Status: SHIPPED | OUTPATIENT
Start: 2024-09-05 | End: 2024-09-10

## 2024-09-05 NOTE — TELEPHONE ENCOUNTER
Patient tested positive for covid and would like paxlovid.  Symptoms include cough chest congestion headache foggy.  Tested 9/5  Onset 9/4   Please send to [facility]

## 2024-09-06 ENCOUNTER — APPOINTMENT (OUTPATIENT)
Dept: PHYSICAL THERAPY | Facility: CLINIC | Age: 69
End: 2024-09-06
Payer: COMMERCIAL

## 2024-09-10 ENCOUNTER — APPOINTMENT (OUTPATIENT)
Dept: PHYSICAL THERAPY | Facility: CLINIC | Age: 69
End: 2024-09-10
Payer: COMMERCIAL

## 2024-09-13 ENCOUNTER — OFFICE VISIT (OUTPATIENT)
Dept: PHYSICAL THERAPY | Facility: CLINIC | Age: 69
End: 2024-09-13
Payer: COMMERCIAL

## 2024-09-13 DIAGNOSIS — M47.816 LUMBAR SPONDYLOSIS: ICD-10-CM

## 2024-09-13 DIAGNOSIS — M54.16 LUMBAR RADICULOPATHY: ICD-10-CM

## 2024-09-13 DIAGNOSIS — M48.061 SPINAL STENOSIS OF LUMBAR REGION, UNSPECIFIED WHETHER NEUROGENIC CLAUDICATION PRESENT: ICD-10-CM

## 2024-09-13 DIAGNOSIS — M54.40 LOW BACK PAIN WITH SCIATICA, SCIATICA LATERALITY UNSPECIFIED, UNSPECIFIED BACK PAIN LATERALITY, UNSPECIFIED CHRONICITY: Primary | ICD-10-CM

## 2024-09-13 PROCEDURE — 97110 THERAPEUTIC EXERCISES: CPT

## 2024-09-13 PROCEDURE — 97112 NEUROMUSCULAR REEDUCATION: CPT

## 2024-09-13 NOTE — PROGRESS NOTES
"Daily Note     Today's date: 2024  Patient name: Tashi Alexander  : 1955  MRN: [id number]  Referring provider: Chirag Cabrera DO  Dx:   Encounter Diagnosis     ICD-10-CM    1. Low back pain with sciatica, sciatica laterality unspecified, unspecified back pain laterality, unspecified chronicity  M54.40       2. Lumbar spondylosis  M47.816       3. Spinal stenosis of lumbar region, unspecified whether neurogenic claudication present  M48.061       4. Lumbar radiculopathy  M54.16           Start Time: 1030  Stop Time: 1055  Total time in clinic (min): 25 minutes    Subjective: Pt reported that he was not able to attend therapy because of having COVID. Pt noted that it has been 10 day and said that he doesn't need to wear a mask. Pt's L/S and his leg sx have been better. Pt noted feeling 80% better.        Objective: See treatment diary below      Assessment: Tolerated treatment well. Program started with the R. Bike. Progressions were held due to pt's fatigue. Pt was challenged with all exercises. Patient would benefit from continued PT    Plan: Continue per plan of care.      Goals: Patient's goal is to increase BLE/core strength    Precautions: RTC surgery  Dx L/S DDD/stenosis (flexion based preference)    HEP: [id number]    Daily Treatment Diary       Manuals     B hip PROM MS MS MS MS     LE nerve glides    MS                       Ther Ex         Seated L/S flexion str 5\"x10 with ball 5\"x10 with ball 5\"x10 with ball 5\"x10 with ball 5\"x10 with ball    SKTC 5\"x10 5\"x10 5\"x10 5\"x10 5\"x10    TA bracing         LTR 5\"x10 5\"x10 5\"x10 5\"x10 5\"x10    Seated PPT 5\"x10 5\"x10 5\"x10 5\"x10     LE nerve glide    10x B 10x B             R. bike 5' 5' 5' 5' 5'    Pt Edu         Neuro Re-ed         SLR 10x2 10x2 10x2 10x2 10x2    Bridges  10x2 10x2 10x2 10x2 10x2    Abd/Oblique isos with ball 5\"x20/  5\"x10 5\"x20/  5\"x10 5\"x20/  5\"x10 5\"x20/  5\"x10 5\"x20/  5\"x10    Mini squats  10x 10x2 10x2 10x2  " "  Hip abd/ext   20x each 20x each 20x each    Pallof press   BTB 5\"x10 NT     Leg press   NV NV     Ther Activity                                    Gait Training                           Modalities         MHP Deferred Deferred Deferred Deferred Deferred                                    "

## 2024-09-17 ENCOUNTER — OFFICE VISIT (OUTPATIENT)
Dept: PHYSICAL THERAPY | Facility: CLINIC | Age: 69
End: 2024-09-17
Payer: COMMERCIAL

## 2024-09-17 DIAGNOSIS — M48.061 SPINAL STENOSIS OF LUMBAR REGION, UNSPECIFIED WHETHER NEUROGENIC CLAUDICATION PRESENT: ICD-10-CM

## 2024-09-17 DIAGNOSIS — M54.40 LOW BACK PAIN WITH SCIATICA, SCIATICA LATERALITY UNSPECIFIED, UNSPECIFIED BACK PAIN LATERALITY, UNSPECIFIED CHRONICITY: Primary | ICD-10-CM

## 2024-09-17 DIAGNOSIS — M47.816 LUMBAR SPONDYLOSIS: ICD-10-CM

## 2024-09-17 DIAGNOSIS — M54.16 LUMBAR RADICULOPATHY: ICD-10-CM

## 2024-09-17 PROCEDURE — 97110 THERAPEUTIC EXERCISES: CPT

## 2024-09-17 PROCEDURE — 97140 MANUAL THERAPY 1/> REGIONS: CPT

## 2024-09-17 NOTE — PROGRESS NOTES
"Daily Note     Today's date: 2024  Patient name: Tashi Alexander  : 1955  MRN: [id number]  Referring provider: Chirag Cabrera DO  Dx:   Encounter Diagnosis     ICD-10-CM    1. Low back pain with sciatica, sciatica laterality unspecified, unspecified back pain laterality, unspecified chronicity  M54.40       2. Lumbar spondylosis  M47.816       3. Spinal stenosis of lumbar region, unspecified whether neurogenic claudication present  M48.061       4. Lumbar radiculopathy  M54.16                      Subjective: LBP is well managed. Recovering slowly from COVID      Objective: See treatment diary below      Assessment: Tolerated treatment well. Mild SOB noted with RSB warm up. Hip ROM needs work, R>L more into IR. Core stability/strengthening tasks are challenging for Tashi. Continued PT would be beneficial to improve function.          Plan: Continue per plan of care.       Goals: Patient's goal is to increase BLE/core strength    Precautions: RTC surgery  Dx L/S DDD/stenosis (flexion based preference)    HEP: [id number]    Daily Treatment Diary       Manuals    B hip PROM MS MS MS MS  MB   LE nerve glides    MS                       Ther Ex         Seated L/S flexion str 5\"x10 with ball 5\"x10 with ball 5\"x10 with ball 5\"x10 with ball 5\"x10 with ball 5\"x10 with ball   SKTC 5\"x10 5\"x10 5\"x10 5\"x10 5\"x10 5\"x10   TA bracing         LTR 5\"x10 5\"x10 5\"x10 5\"x10 5\"x10 5\"x10   Seated PPT 5\"x10 5\"x10 5\"x10 5\"x10     LE nerve glide    10x B 10x B 10x B            LEOBARDO nick 5' 5' 5' 5' 5' 5'   Pt Edu         Neuro Re-ed         SLR 10x2 10x2 10x2 10x2 10x2 10x2   Bridges  10x2 10x2 10x2 10x2 10x2 10x2   Abd/Oblique isos with ball 5\"x20/  5\"x10 5\"x20/  5\"x10 5\"x20/  5\"x10 5\"x20/  5\"x10 5\"x20/  5\"x10 5\"x20/  5\"x10   Mini squats  10x 10x2 10x2 10x2 10x2   Hip abd/ext   20x each 20x each 20x each 20x each   Pallof press   BTB 5\"x10 NT     Leg press   NV NV     Ther Activity                      "               Gait Training                           Modalities         MHP Deferred Deferred Deferred Deferred Deferred deferred

## 2024-09-18 DIAGNOSIS — J30.2 SEASONAL ALLERGIES: ICD-10-CM

## 2024-09-19 RX ORDER — AZELASTINE HYDROCHLORIDE 137 UG/1
SPRAY, METERED NASAL
Qty: 90 ML | Refills: 1 | Status: SHIPPED | OUTPATIENT
Start: 2024-09-19

## 2024-09-20 ENCOUNTER — APPOINTMENT (OUTPATIENT)
Dept: PHYSICAL THERAPY | Facility: CLINIC | Age: 69
End: 2024-09-20
Payer: COMMERCIAL

## 2024-09-27 ENCOUNTER — APPOINTMENT (OUTPATIENT)
Dept: PHYSICAL THERAPY | Facility: CLINIC | Age: 69
End: 2024-09-27
Payer: COMMERCIAL

## 2024-10-02 DIAGNOSIS — E66.01 MORBID OBESITY WITH BMI OF 40.0-44.9, ADULT (HCC): Primary | ICD-10-CM

## 2024-10-02 RX ORDER — TIRZEPATIDE 10 MG/.5ML
10 INJECTION, SOLUTION SUBCUTANEOUS WEEKLY
Qty: 2 ML | Refills: 0 | Status: SHIPPED | OUTPATIENT
Start: 2024-10-02

## 2024-10-15 ENCOUNTER — APPOINTMENT (OUTPATIENT)
Dept: LAB | Facility: CLINIC | Age: 69
End: 2024-10-15
Payer: COMMERCIAL

## 2024-10-15 DIAGNOSIS — Z12.5 SCREENING PSA (PROSTATE SPECIFIC ANTIGEN): ICD-10-CM

## 2024-10-15 DIAGNOSIS — E78.5 DYSLIPIDEMIA: ICD-10-CM

## 2024-10-15 LAB
ALBUMIN SERPL BCG-MCNC: 4.3 G/DL (ref 3.5–5)
ALP SERPL-CCNC: 53 U/L (ref 34–104)
ALT SERPL W P-5'-P-CCNC: 12 U/L (ref 7–52)
ANION GAP SERPL CALCULATED.3IONS-SCNC: 8 MMOL/L (ref 4–13)
AST SERPL W P-5'-P-CCNC: 13 U/L (ref 13–39)
BILIRUB SERPL-MCNC: 0.99 MG/DL (ref 0.2–1)
BUN SERPL-MCNC: 16 MG/DL (ref 5–25)
CALCIUM SERPL-MCNC: 10 MG/DL (ref 8.4–10.2)
CHLORIDE SERPL-SCNC: 103 MMOL/L (ref 96–108)
CHOLEST SERPL-MCNC: 174 MG/DL
CO2 SERPL-SCNC: 27 MMOL/L (ref 21–32)
CREAT SERPL-MCNC: 1.14 MG/DL (ref 0.6–1.3)
GFR SERPL CREATININE-BSD FRML MDRD: 65 ML/MIN/1.73SQ M
GLUCOSE P FAST SERPL-MCNC: 81 MG/DL (ref 65–99)
HDLC SERPL-MCNC: 36 MG/DL
LDLC SERPL CALC-MCNC: 107 MG/DL (ref 0–100)
NONHDLC SERPL-MCNC: 138 MG/DL
POTASSIUM SERPL-SCNC: 3.9 MMOL/L (ref 3.5–5.3)
PROT SERPL-MCNC: 7.6 G/DL (ref 6.4–8.4)
PSA SERPL-MCNC: 0.47 NG/ML (ref 0–4)
SODIUM SERPL-SCNC: 138 MMOL/L (ref 135–147)
TRIGL SERPL-MCNC: 153 MG/DL

## 2024-10-15 PROCEDURE — 80061 LIPID PANEL: CPT

## 2024-10-15 PROCEDURE — 80053 COMPREHEN METABOLIC PANEL: CPT

## 2024-10-15 PROCEDURE — G0103 PSA SCREENING: HCPCS

## 2024-10-15 PROCEDURE — 36415 COLL VENOUS BLD VENIPUNCTURE: CPT

## 2024-10-23 ENCOUNTER — OFFICE VISIT (OUTPATIENT)
Dept: INTERNAL MEDICINE CLINIC | Facility: CLINIC | Age: 69
End: 2024-10-23
Payer: COMMERCIAL

## 2024-10-23 VITALS
HEIGHT: 69 IN | DIASTOLIC BLOOD PRESSURE: 82 MMHG | HEART RATE: 71 BPM | SYSTOLIC BLOOD PRESSURE: 138 MMHG | OXYGEN SATURATION: 98 % | WEIGHT: 272.6 LBS | BODY MASS INDEX: 40.38 KG/M2

## 2024-10-23 DIAGNOSIS — E78.5 DYSLIPIDEMIA: ICD-10-CM

## 2024-10-23 DIAGNOSIS — M79.674 PAIN OF RIGHT GREAT TOE: ICD-10-CM

## 2024-10-23 DIAGNOSIS — M79.675 PAIN OF LEFT GREAT TOE: ICD-10-CM

## 2024-10-23 DIAGNOSIS — R73.01 IMPAIRED FASTING GLUCOSE: ICD-10-CM

## 2024-10-23 DIAGNOSIS — E66.01 MORBID OBESITY WITH BMI OF 40.0-44.9, ADULT (HCC): Primary | ICD-10-CM

## 2024-10-23 DIAGNOSIS — Z12.83 SKIN CANCER SCREENING: ICD-10-CM

## 2024-10-23 PROCEDURE — 99214 OFFICE O/P EST MOD 30 MIN: CPT | Performed by: INTERNAL MEDICINE

## 2024-10-23 RX ORDER — TIRZEPATIDE 12.5 MG/.5ML
12.5 INJECTION, SOLUTION SUBCUTANEOUS WEEKLY
Qty: 2 ML | Refills: 0 | Status: SHIPPED | OUTPATIENT
Start: 2024-10-23

## 2024-10-23 NOTE — PROGRESS NOTES
Ambulatory Visit  Name: Tashi Alexander      : 1955      MRN: [id number]  Encounter Provider: Lea Reyes, MD  Encounter Date: 10/23/2024   [facility]    Assessment & Plan  Morbid obesity with BMI of 40.0-44.9, adult (HCC)  Mild constipation acid reflux on Zepbound  Would like to keep increasing the dose as he has lost 30 pounds so far    Orders:    tirzepatide (Zepbound) 12.5 mg/0.5 mL auto-injector; Inject 0.5 mL (12.5 mg total) under the skin once a week    Pain of right great toe    Orders:    Ambulatory Referral to Podiatry; Future    Pain of left great toe    Orders:    Ambulatory Referral to Podiatry; Future    Skin cancer screening  Requesting dermatology evaluation for skin cancer screening  Orders:    Ambulatory Referral to Dermatology; Future    Impaired fasting glucose    Orders:    CBC and differential; Future    Comprehensive metabolic panel; Future    Hemoglobin A1C; Future    Dyslipidemia    Orders:    CBC and differential; Future    Comprehensive metabolic panel; Future    Lipid panel; Future         History of Present Illness     Here for a follow-up  He has lost about 30 pounds on Zepbound he is on his third week of 10 mg and would like to keep increasing the dose  He experiences mild constipation and takes a laxative as needed, gassiness, frequent belching  He also has acid reflux/regurgitation at night and takes over-the-counter medications as needed  Requesting dermatology evaluation for skin cancer screening  Also requesting podiatry evaluation for pain in the great toes  Recent labs reviewed and unremarkable        Review of Systems   Constitutional:  Negative for unexpected weight change.   Respiratory:  Negative for shortness of breath.    Cardiovascular:  Negative for chest pain and palpitations.   Gastrointestinal:  Positive for constipation. Negative for nausea and vomiting.     Past Medical History:   Diagnosis Date    Allergic      Anxiety June 2022    CPAP (continuous positive airway pressure) dependence     Eating disorder adulthood    Stress    Elevated serum creatinine     last assessed 01/16/2018    Morbid obesity (HCC) 01/11/2016    Obesity     Right hand pain 3/7/2022    Screening PSA (prostate specific antigen) 04/13/2018    Sebaceous cyst 6/12/2023    Sleep apnea     Tinnitus, left     last assessed 08/11/2014     Past Surgical History:   Procedure Laterality Date    COLONOSCOPY      JOINT REPLACEMENT  01/09/2018    PA ARTHROPLASTY GLENOHUMERAL JOINT TOTAL SHOULDER Right 01/09/2018    Procedure: TOTAL SHOULDER ARTHROPLASTY;  Surgeon: Robert Urban MD;  Location: [facility];  Service: Orthopedics    PA COLONOSCOPY FLX DX W/COLLJ SPEC WHEN PFRMD N/A 06/27/2017    Procedure: COLONOSCOPY;  Surgeon: Serge Blevins MD;  Location: [facility];  Service: Colorectal    PA EXCISION TUMOR SOFT TIS BACK/FLANK SUBQ 3 CM/> N/A 7/19/2023    Procedure: EXCISION  BIOPSY LESION/MASS BACK;  Surgeon: Ari Ahmadi MD;  Location: [facility];  Service: General     Family History   Problem Relation Age of Onset    Heart disease Mother         Heavy smoker    Diabetes Mother     Other Mother         cardiac disorder    Cancer Father         heavy smoker    Diabetes Father      Social History     Tobacco Use    Smoking status: Never    Smokeless tobacco: Never   Vaping Use    Vaping status: Never Used   Substance and Sexual Activity    Alcohol use: Yes     Comment: Only during family gatherings    Drug use: No    Sexual activity: Yes     Partners: Female     Birth control/protection: Post-menopausal     Current Outpatient Medications on File Prior to Visit   Medication Sig    Azelastine HCl 137 MCG/SPRAY SOLN USE 1 SPRAY INTO EACH NOSTRIL TWICE A DAY    naproxen (NAPROSYN) 500 mg tablet Take 1 tablet (500 mg total) by mouth 2 (two) times a day as needed for moderate pain    tadalafil (CIALIS) 20 MG tablet Take 1 tablet (20 mg total) by mouth daily  "as needed for erectile dysfunction    [DISCONTINUED] tirzepatide (Zepbound) 10 mg/0.5 mL auto-injector Inject 0.5 mL (10 mg total) under the skin once a week     Allergies   Allergen Reactions    Seasonal Ic [Cholestatin] Itching     Runny eyes and a cough sometimes     Immunization History   Administered Date(s) Administered    COVID-19 PFIZER VACCINE 0.3 ML IM 02/15/2021, 03/09/2021, 09/22/2021    COVID-19 Pfizer mRNA vacc PF fred-sucrose 12 yr and older (Comirnaty) 09/25/2024    COVID-19 Pfizer vac (Fred-sucrose, gray cap) 12 yr+ IM 05/26/2022    INFLUENZA 10/04/2020, 09/27/2021, 10/26/2022, 09/27/2023    Influenza Quadrivalent Preservative Free 3 years and older IM 10/10/2017    Influenza, recombinant, quadrivalent,injectable, preservative free 10/22/2018, 10/21/2019    Pneumococcal Conjugate 13-Valent 01/05/2021    Pneumococcal Conjugate Vaccine 20-valent (Pcv20), Polysace 09/09/2022    Respiratory Syncytial Virus Vaccine (Recombinant, Adjuvanted) 11/20/2023    Tdap 04/13/2018    Zoster Vaccine Recombinant 10/02/2020, 01/05/2021    influenza, trivalent, adjuvanted 09/20/2021, 09/25/2024     Objective     /82 (BP Location: Left arm, Patient Position: Sitting, Cuff Size: Large)   Pulse 71   Ht 5' 9\" (1.753 m)   Wt 124 kg (272 lb 9.6 oz)   SpO2 98%   BMI 40.26 kg/m²     Physical Exam  Vitals and nursing note reviewed.   Constitutional:       General: He is not in acute distress.     Appearance: He is well-developed. He is obese. He is not ill-appearing.   Eyes:      Conjunctiva/sclera: Conjunctivae normal.   Cardiovascular:      Rate and Rhythm: Normal rate and regular rhythm.      Heart sounds: No murmur heard.  Pulmonary:      Effort: Pulmonary effort is normal. No respiratory distress.      Breath sounds: Normal breath sounds.   Abdominal:      Palpations: Abdomen is soft.      Tenderness: There is no abdominal tenderness.   Musculoskeletal:      Cervical back: Neck supple.      Right lower leg: No " edema.      Left lower leg: No edema.   Lymphadenopathy:      Cervical: Cervical adenopathy present.      Left cervical: Posterior cervical adenopathy present.   Skin:     Capillary Refill: Capillary refill takes less than 2 seconds.      Findings: Lesion (Many scattered bright red cherry angiomas on the torso) present.   Neurological:      Mental Status: He is alert.   Psychiatric:         Mood and Affect: Mood normal.         Behavior: Behavior normal.

## 2024-10-23 NOTE — ASSESSMENT & PLAN NOTE
Mild constipation acid reflux on Zepbound  Would like to keep increasing the dose as he has lost 30 pounds so far    Orders:    tirzepatide (Zepbound) 12.5 mg/0.5 mL auto-injector; Inject 0.5 mL (12.5 mg total) under the skin once a week

## 2024-11-01 ENCOUNTER — TELEPHONE (OUTPATIENT)
Age: 69
End: 2024-11-01

## 2024-11-01 NOTE — TELEPHONE ENCOUNTER
Received call from Patient to schedule New Patient appt for Skin Check. Scheduled 5/8/2025 at 8:00 am for Dr. Mena in [place]. Verified insurance, provided [place] address.     Patient verbalized understanding.

## 2024-11-20 ENCOUNTER — OFFICE VISIT (OUTPATIENT)
Dept: PODIATRY | Facility: CLINIC | Age: 69
End: 2024-11-20
Payer: COMMERCIAL

## 2024-11-20 VITALS — BODY MASS INDEX: 39.1 KG/M2 | WEIGHT: 264 LBS | HEIGHT: 69 IN

## 2024-11-20 DIAGNOSIS — M79.674 PAIN OF RIGHT GREAT TOE: ICD-10-CM

## 2024-11-20 DIAGNOSIS — M79.675 PAIN OF LEFT GREAT TOE: ICD-10-CM

## 2024-11-20 DIAGNOSIS — L60.0 INGROWN NAIL OF GREAT TOE OF LEFT FOOT: Primary | ICD-10-CM

## 2024-11-20 DIAGNOSIS — L60.0 INGROWN NAIL OF GREAT TOE OF RIGHT FOOT: ICD-10-CM

## 2024-11-20 PROCEDURE — 11732 AVLSN NAIL PLATE SIMPLE EACH: CPT | Performed by: PODIATRIST

## 2024-11-20 PROCEDURE — 99203 OFFICE O/P NEW LOW 30 MIN: CPT | Performed by: PODIATRIST

## 2024-11-20 PROCEDURE — 11730 AVULSION NAIL PLATE SIMPLE 1: CPT | Performed by: PODIATRIST

## 2024-12-02 DIAGNOSIS — E66.01 MORBID OBESITY WITH BMI OF 40.0-44.9, ADULT (HCC): ICD-10-CM

## 2024-12-02 RX ORDER — TIRZEPATIDE 12.5 MG/.5ML
12.5 INJECTION, SOLUTION SUBCUTANEOUS WEEKLY
Qty: 2 ML | Refills: 0 | Status: SHIPPED | OUTPATIENT
Start: 2024-12-02 | End: 2024-12-05

## 2024-12-05 DIAGNOSIS — E66.01 MORBID OBESITY WITH BMI OF 40.0-44.9, ADULT (HCC): Primary | ICD-10-CM

## 2024-12-05 RX ORDER — TIRZEPATIDE 15 MG/.5ML
15 INJECTION, SOLUTION SUBCUTANEOUS WEEKLY
Qty: 2 ML | Refills: 3 | Status: SHIPPED | OUTPATIENT
Start: 2024-12-05

## 2024-12-30 ENCOUNTER — TELEPHONE (OUTPATIENT)
Dept: INTERNAL MEDICINE CLINIC | Facility: CLINIC | Age: 69
End: 2024-12-30

## 2024-12-30 NOTE — TELEPHONE ENCOUNTER
PA Zepbound 15 MG/0.5ML APPROVED         Patient advised by          []MyChart Message  []Phone call   [x]LMOM  []L/M to call office as no active Communication consent on file  []Unable to leave detailed message as VM not approved on Communication consent       Pharmacy advised by    [x]Fax  []Phone call

## 2024-12-30 NOTE — TELEPHONE ENCOUNTER
PA Zepbound 15 MG/0.5ML SUBMITTED     to [employer]     via    []CMM-KEY:    [x]Surescripts-Case ID # [id number]  []Availity-Auth ID #  NDC #    []Faxed to plan   []Other website    []Phone call Case ID #      []PA sent as URGENT    All office notes, labs and other pertaining documents and studies sent. Clinical questions answered. Awaiting determination from insurance company.     Turnaround time for your insurance to make a decision on your Prior Authorization can take 7-21 business days.

## 2025-01-15 ENCOUNTER — OFFICE VISIT (OUTPATIENT)
Dept: SLEEP CENTER | Facility: CLINIC | Age: 70
End: 2025-01-15
Payer: COMMERCIAL

## 2025-01-15 VITALS
HEIGHT: 69 IN | DIASTOLIC BLOOD PRESSURE: 90 MMHG | BODY MASS INDEX: 37.89 KG/M2 | WEIGHT: 255.8 LBS | SYSTOLIC BLOOD PRESSURE: 126 MMHG

## 2025-01-15 DIAGNOSIS — G47.34 SLEEP RELATED HYPOXIA: ICD-10-CM

## 2025-01-15 DIAGNOSIS — G47.33 OSA (OBSTRUCTIVE SLEEP APNEA): Primary | ICD-10-CM

## 2025-01-15 DIAGNOSIS — J30.2 SEASONAL ALLERGIES: ICD-10-CM

## 2025-01-15 DIAGNOSIS — F51.12 INSUFFICIENT SLEEP SYNDROME: ICD-10-CM

## 2025-01-15 DIAGNOSIS — E66.9 OBESITY (BMI 30-39.9): ICD-10-CM

## 2025-01-15 DIAGNOSIS — Z91.199 POOR COMPLIANCE WITH CPAP TREATMENT: ICD-10-CM

## 2025-01-15 PROCEDURE — 99214 OFFICE O/P EST MOD 30 MIN: CPT | Performed by: INTERNAL MEDICINE

## 2025-01-15 NOTE — PROGRESS NOTES
Name: Tashi Alexander      : 1955      MRN: [id number]  Encounter Provider: Edi Mcneal MD  Encounter Date: 1/15/2025   Encounter department: [facility] [place]  :  Assessment & Plan  JAZMYN (obstructive sleep apnea)    Orders:    PAP DME Resupply/Reorder    Sleep related hypoxia         Poor compliance with CPAP treatment         Insufficient sleep syndrome         Seasonal allergies         Obesity (BMI 30-39.9)         PLAN - Problem List Items & Comorbidities Addressed this Visit :  1. I reviewed results of prior studies and physiologic data with the patient.   2.  I discussed treatment options with risks and benefits.  3. Treatment with  PAP is medically necessary and [unfilled] is agreable to continue use.   4. Care of equipment, methods to improve comfort using PAP and importance of compliance with therapy were discussed.  5. Pressure setting: continue BiPAP auto 20/10/4 cmH2O in view of weight reduction.    6. Rx provided to replace supplies and Care coordinated with DME provider.   7.  Encouraged to persist with strategies for weight reduction.    8.  Also advised allowing sufficient opportunity for sleep.  9. Follow-up is advised in 6 months or sooner if needed to monitor progress, compliance and to adjust therapy.  10.  He may need a retitration study if weight reduction continues.      History of Present Illness   HPI          Follow-Up Note - Sleep Center   Tashi Alexander  69 y.o. male  :1955  MRN:[id number]  DOS:1/15/2025    CC: I saw this patient for follow-up in clinic today for Sleep disordered breathing, Coexisting Sleep and Medical Problems.  He is using a ResMed machine.. Interval changes: None reported.      Results of prior studies in : The diagnostic study demonstrated AHI of 69 per hour. Minimum oxygen saturation was 70% any spent is 96.8% of time asleep with saturations below 90%. During the subsequent therapeutic study, sleep disordered breathing was  "adequately remediated with BiPAP at 19/14 cm H2O.      PFSH, Problem List, Medications & Allergies were reviewed in EMR.   He  has a past medical history of Allergic (2015), Anxiety (June 2022), CPAP (continuous positive airway pressure) dependence, Eating disorder (adulthood), Elevated serum creatinine, Morbid obesity (HCC) (01/11/2016), Obesity, Right hand pain (3/7/2022), Screening PSA (prostate specific antigen) (04/13/2018), Sebaceous cyst (6/12/2023), Sleep apnea, and Tinnitus, left.    He has a current medication list which includes the following prescription(s): azelastine hcl, naproxen, tadalafil, and zepbound.    PHYSIOLOGICAL DATA REVIEW : Device has been used 28/30 days and average on days used is 3 hours and 57 minutes.   using PAP > 4 hours/night 47%. Estimated SHUN 1.5/hour with pressure of 16.8/12.9cm H2O@90th/95th percentile;.  INTERPRETATION: Compliance is poor; Pressure setting is:within target range; ;     SUBJECTIVE: With respect to use of PAP, Tashi  is experiencing significant adverse effects: mask leaks  and pressure insufficient.He derives benefit.. Is satisfied with sleep and daytime function.     Sleep Routine: Tashi reports getting (Patient-Rptd) (P) 6 hrs sleep; he has no difficulty initiating or maintaining sleep . He arises spontaneously and feels refreshed.Tashi]denies excessive daytime sleepiness, or napping.  He rated himself at Total score: (Patient-Rptd) (P) 5 /24 on the [place] Sleepiness Scale.   Other issues: None reported.     Habits: Reports that he has never smoked. He has never used smokeless tobacco.,  Reports current alcohol use.,  Reports no history of drug use., Caffeine use: limited until  ; Exercise routine: regular.      ROS: Significant for around 20 pounds intentional weight reduction since started on Zepbound a few months ago allergies are controlled.  Pain has been controlled.  Review of systems was otherwise negative..    EXAM: /90   Ht 5' 9\" (1.753 m)  " " Wt 116 kg (255 lb 12.8 oz)   BMI 37.78 kg/m²     Wt Readings from Last 3 Encounters:   01/15/25 116 kg (255 lb 12.8 oz)   11/20/24 120 kg (264 lb)   10/23/24 124 kg (272 lb 9.6 oz)      Patient is well groomed; well appearing.   CNS: Alert, orientated, speech clear & coherent  Psych: cooperative and in no distress. Mental state: Appears normal.  H&N: EOMI; NC/AT: No facial pressure marks, no rashes.    Skin/Extrem: col & hydration normal; no edema  Resp: Respiratory effort is normal  Physical findings otherwise essentially unchanged from previous.      Thank you for allowing me to participate in the care of this patient.    Sincerely,     Authenticated electronically on 01/15/25   Board Certified Specialist     Portions of the record may have been created with voice recognition software. Occasional wrong word or \"sound a like\" substitutions may have occurred due to the inherent limitations of voice recognition software. There may also be notations and random deletions of words or characters from malfunctioning software. Read the chart carefully and recognize, using context, where substitutions/deletions have occurred.    Sitting and reading: (Patient-Rptd) (P) Slight chance of dozing  Watching TV: (Patient-Rptd) (P) Slight chance of dozing  Sitting, inactive in a public place (e.g. a theatre or a meeting): (Patient-Rptd) (P) Slight chance of dozing  As a passenger in a car for an hour without a break: (Patient-Rptd) (P) Would never doze  Lying down to rest in the afternoon when circumstances permit: (Patient-Rptd) (P) Slight chance of dozing  Sitting and talking to someone: (Patient-Rptd) (P) Would never doze  Sitting quietly after a lunch without alcohol: (Patient-Rptd) (P) Would never doze  In a car, while stopped for a few minutes in traffic: (Patient-Rptd) (P) Slight chance of dozing  Total score: (Patient-Rptd) (P) 5     Review of Systems  Pertinent positives/negatives included in HPI and also as noted: " "      Objective   /90   Ht 5' 9\" (1.753 m)   Wt 116 kg (255 lb 12.8 oz)   BMI 37.78 kg/m²        Physical Exam      Data  Lab Results   Component Value Date    HGB 14.9 04/13/2024    HCT 42.3 04/13/2024    MCV 91 04/13/2024      Lab Results   Component Value Date    CALCIUM 10.0 10/15/2024     10/02/2017    K 3.9 10/15/2024    CO2 27 10/15/2024     10/15/2024    BUN 16 10/15/2024    CREATININE 1.14 10/15/2024     No results found for: \"IRON\", \"TIBC\", \"FERRITIN\"  Lab Results   Component Value Date    AST 13 10/15/2024    ALT 12 10/15/2024         "

## 2025-01-16 ENCOUNTER — TELEPHONE (OUTPATIENT)
Dept: SLEEP CENTER | Facility: CLINIC | Age: 70
End: 2025-01-16

## 2025-02-24 ENCOUNTER — OFFICE VISIT (OUTPATIENT)
Dept: INTERNAL MEDICINE CLINIC | Facility: CLINIC | Age: 70
End: 2025-02-24
Payer: COMMERCIAL

## 2025-02-24 VITALS
WEIGHT: 250 LBS | DIASTOLIC BLOOD PRESSURE: 84 MMHG | SYSTOLIC BLOOD PRESSURE: 138 MMHG | HEART RATE: 84 BPM | OXYGEN SATURATION: 97 % | BODY MASS INDEX: 36.92 KG/M2

## 2025-02-24 DIAGNOSIS — J06.9 UPPER RESPIRATORY TRACT INFECTION, UNSPECIFIED TYPE: Primary | ICD-10-CM

## 2025-02-24 DIAGNOSIS — J32.9 SINUSITIS, UNSPECIFIED CHRONICITY, UNSPECIFIED LOCATION: ICD-10-CM

## 2025-02-24 DIAGNOSIS — R05.8 PRODUCTIVE COUGH: ICD-10-CM

## 2025-02-24 PROCEDURE — 99213 OFFICE O/P EST LOW 20 MIN: CPT | Performed by: GENERAL ACUTE CARE HOSPITAL

## 2025-02-24 RX ORDER — AZITHROMYCIN 250 MG/1
TABLET, FILM COATED ORAL
Qty: 6 TABLET | Refills: 0 | Status: SHIPPED | OUTPATIENT
Start: 2025-02-24 | End: 2025-03-01

## 2025-02-24 NOTE — PROGRESS NOTES
Name: Tashi Alexander      : 1955      MRN: [id number]  Encounter Provider: Shae Fox MD  Encounter Date: 2025   [facility]  :  Assessment & Plan  Upper respiratory tract infection, unspecified type    Orders:    azithromycin (Zithromax) 250 mg tablet; Take 2 tablets (500 mg total) by mouth daily for 1 day, THEN 1 tablet (250 mg total) daily for 4 days.    amoxicillin-clavulanate (AUGMENTIN) 875-125 mg per tablet; Take 1 tablet by mouth every 12 (twelve) hours for 5 days    Sinusitis, unspecified chronicity, unspecified location    Orders:    amoxicillin-clavulanate (AUGMENTIN) 875-125 mg per tablet; Take 1 tablet by mouth every 12 (twelve) hours for 5 days    Productive cough  For more than a week without improvement.   Will start abx to cover CAP.                 History of Present Illness   HPI    For more than a week, productive cough, chest wall pain, wheezing, chest congestion. Initially also having sinus congestion/pain, now feels it's moving down to chest. Symptoms getting worse.   Review of Systems    Objective   /84   Pulse 84   Wt 113 kg (250 lb)   SpO2 97%   BMI 36.92 kg/m²      Physical Exam

## 2025-02-25 PROBLEM — R05.8 PRODUCTIVE COUGH: Status: ACTIVE | Noted: 2025-02-25

## 2025-03-27 PROBLEM — R05.8 PRODUCTIVE COUGH: Status: RESOLVED | Noted: 2025-02-25 | Resolved: 2025-03-27

## 2025-03-28 DIAGNOSIS — E66.01 MORBID OBESITY WITH BMI OF 40.0-44.9, ADULT (HCC): ICD-10-CM

## 2025-03-28 RX ORDER — TIRZEPATIDE 15 MG/.5ML
15 INJECTION, SOLUTION SUBCUTANEOUS WEEKLY
Qty: 2 ML | Refills: 5 | Status: SHIPPED | OUTPATIENT
Start: 2025-03-28

## 2025-03-28 NOTE — TELEPHONE ENCOUNTER
Refill must be reviewed and completed by the office or provider. The refill is unable to be approved or denied by the medication management team.    Zepbound-Please review to see if the refill is appropriate.

## 2025-04-22 ENCOUNTER — APPOINTMENT (OUTPATIENT)
Dept: LAB | Facility: CLINIC | Age: 70
End: 2025-04-22
Attending: INTERNAL MEDICINE
Payer: COMMERCIAL

## 2025-04-22 DIAGNOSIS — R73.01 IMPAIRED FASTING GLUCOSE: ICD-10-CM

## 2025-04-22 DIAGNOSIS — E78.5 DYSLIPIDEMIA: ICD-10-CM

## 2025-04-22 LAB
ALBUMIN SERPL BCG-MCNC: 4 G/DL (ref 3.5–5)
ALP SERPL-CCNC: 48 U/L (ref 34–104)
ALT SERPL W P-5'-P-CCNC: 18 U/L (ref 7–52)
ANION GAP SERPL CALCULATED.3IONS-SCNC: 4 MMOL/L (ref 4–13)
AST SERPL W P-5'-P-CCNC: 15 U/L (ref 13–39)
BASOPHILS # BLD AUTO: 0.04 THOUSANDS/ÂΜL (ref 0–0.1)
BASOPHILS NFR BLD AUTO: 1 % (ref 0–1)
BILIRUB SERPL-MCNC: 1.08 MG/DL (ref 0.2–1)
BUN SERPL-MCNC: 20 MG/DL (ref 5–25)
CALCIUM SERPL-MCNC: 9.5 MG/DL (ref 8.4–10.2)
CHLORIDE SERPL-SCNC: 105 MMOL/L (ref 96–108)
CHOLEST SERPL-MCNC: 179 MG/DL (ref ?–200)
CO2 SERPL-SCNC: 31 MMOL/L (ref 21–32)
CREAT SERPL-MCNC: 1.14 MG/DL (ref 0.6–1.3)
EOSINOPHIL # BLD AUTO: 0.2 THOUSAND/ÂΜL (ref 0–0.61)
EOSINOPHIL NFR BLD AUTO: 3 % (ref 0–6)
ERYTHROCYTE [DISTWIDTH] IN BLOOD BY AUTOMATED COUNT: 12.4 % (ref 11.6–15.1)
EST. AVERAGE GLUCOSE BLD GHB EST-MCNC: 82 MG/DL
GFR SERPL CREATININE-BSD FRML MDRD: 65 ML/MIN/1.73SQ M
GLUCOSE P FAST SERPL-MCNC: 101 MG/DL (ref 65–99)
HBA1C MFR BLD: 4.5 %
HCT VFR BLD AUTO: 43.2 % (ref 36.5–49.3)
HDLC SERPL-MCNC: 40 MG/DL
HGB BLD-MCNC: 15.3 G/DL (ref 12–17)
IMM GRANULOCYTES # BLD AUTO: 0.01 THOUSAND/UL (ref 0–0.2)
IMM GRANULOCYTES NFR BLD AUTO: 0 % (ref 0–2)
LDLC SERPL CALC-MCNC: 115 MG/DL (ref 0–100)
LYMPHOCYTES # BLD AUTO: 2.12 THOUSANDS/ÂΜL (ref 0.6–4.47)
LYMPHOCYTES NFR BLD AUTO: 31 % (ref 14–44)
MCH RBC QN AUTO: 31.9 PG (ref 26.8–34.3)
MCHC RBC AUTO-ENTMCNC: 35.4 G/DL (ref 31.4–37.4)
MCV RBC AUTO: 90 FL (ref 82–98)
MONOCYTES # BLD AUTO: 0.46 THOUSAND/ÂΜL (ref 0.17–1.22)
MONOCYTES NFR BLD AUTO: 7 % (ref 4–12)
NEUTROPHILS # BLD AUTO: 4.03 THOUSANDS/ÂΜL (ref 1.85–7.62)
NEUTS SEG NFR BLD AUTO: 58 % (ref 43–75)
NONHDLC SERPL-MCNC: 139 MG/DL
NRBC BLD AUTO-RTO: 0 /100 WBCS
PLATELET # BLD AUTO: 155 THOUSANDS/UL (ref 149–390)
PMV BLD AUTO: 11.5 FL (ref 8.9–12.7)
POTASSIUM SERPL-SCNC: 4 MMOL/L (ref 3.5–5.3)
PROT SERPL-MCNC: 6.9 G/DL (ref 6.4–8.4)
RBC # BLD AUTO: 4.79 MILLION/UL (ref 3.88–5.62)
SODIUM SERPL-SCNC: 140 MMOL/L (ref 135–147)
TRIGL SERPL-MCNC: 122 MG/DL (ref ?–150)
WBC # BLD AUTO: 6.86 THOUSAND/UL (ref 4.31–10.16)

## 2025-04-22 PROCEDURE — 36415 COLL VENOUS BLD VENIPUNCTURE: CPT

## 2025-04-22 PROCEDURE — 80061 LIPID PANEL: CPT

## 2025-04-22 PROCEDURE — 83036 HEMOGLOBIN GLYCOSYLATED A1C: CPT

## 2025-04-22 PROCEDURE — 80053 COMPREHEN METABOLIC PANEL: CPT

## 2025-04-22 PROCEDURE — 85025 COMPLETE CBC W/AUTO DIFF WBC: CPT

## 2025-04-28 ENCOUNTER — OFFICE VISIT (OUTPATIENT)
Dept: INTERNAL MEDICINE CLINIC | Facility: CLINIC | Age: 70
End: 2025-04-28
Payer: COMMERCIAL

## 2025-04-28 VITALS
SYSTOLIC BLOOD PRESSURE: 126 MMHG | OXYGEN SATURATION: 97 % | TEMPERATURE: 97.2 F | BODY MASS INDEX: 35.31 KG/M2 | DIASTOLIC BLOOD PRESSURE: 78 MMHG | WEIGHT: 238.4 LBS | HEIGHT: 69 IN | HEART RATE: 73 BPM

## 2025-04-28 DIAGNOSIS — Z00.00 ANNUAL PHYSICAL EXAM: Primary | ICD-10-CM

## 2025-04-28 DIAGNOSIS — Z12.5 SCREENING PSA (PROSTATE SPECIFIC ANTIGEN): ICD-10-CM

## 2025-04-28 DIAGNOSIS — E66.01 MORBID OBESITY WITH BMI OF 40.0-44.9, ADULT (HCC): ICD-10-CM

## 2025-04-28 DIAGNOSIS — E78.5 DYSLIPIDEMIA: ICD-10-CM

## 2025-04-28 DIAGNOSIS — G47.33 OSA (OBSTRUCTIVE SLEEP APNEA): ICD-10-CM

## 2025-04-28 DIAGNOSIS — R73.01 IMPAIRED FASTING GLUCOSE: ICD-10-CM

## 2025-04-28 PROCEDURE — 99397 PER PM REEVAL EST PAT 65+ YR: CPT | Performed by: INTERNAL MEDICINE

## 2025-04-28 NOTE — ASSESSMENT & PLAN NOTE
Orders:  •  Lipid panel; Future  •  CBC and differential; Future  •  Comprehensive metabolic panel; Future  •  Hemoglobin A1C; Future

## 2025-04-28 NOTE — PROGRESS NOTES
"Adult Annual Physical  Name: Tashi Alexander      : 1955      MRN: [id number]  Encounter Provider: Lea Reyes, MD  Encounter Date: 2025   [facility]    :Assessment & Plan  Annual physical exam         Screening PSA (prostate specific antigen)    Orders:  •  PSA, Total Screen; Future    Impaired fasting glucose    Orders:  •  Comprehensive metabolic panel; Future  •  Hemoglobin A1C; Future    Dyslipidemia    Orders:  •  Lipid panel; Future  •  Comprehensive metabolic panel; Future    JAZMYN (obstructive sleep apnea)         Morbid obesity with BMI of 40.0-44.9, adult ([facility])    Orders:  •  Lipid panel; Future  •  CBC and differential; Future  •  Comprehensive metabolic panel; Future  •  Hemoglobin A1C; Future      Annual physical exam               Annual Physical Plan       History of Present Illness     Adult Annual Physical  Review of Systems      Objective   /78 (BP Location: Left arm, Patient Position: Sitting, Cuff Size: Standard)   Pulse 73   Temp (!) 97.2 °F (36.2 °C) (Tympanic)   Ht 5' 9\" (1.753 m)   Wt 108 kg (238 lb 6.4 oz)   SpO2 97%   BMI 35.21 kg/m²     Physical Exam    "

## 2025-04-28 NOTE — PATIENT INSTRUCTIONS
"Patient Education     Routine physical for adults   The Basics   Written by the doctors and editors at [facility]   What is a physical? -- A physical is a routine visit, or \"check-up,\" with your doctor. You might also hear it called a \"wellness visit\" or \"preventive visit.\"  During each visit, the doctor will:   Ask about your physical and mental health   Ask about your habits, behaviors, and lifestyle   Do an exam   Give you vaccines if needed   Talk to you about any medicines you take   Give advice about your health   Answer your questions  Getting regular check-ups is an important part of taking care of your health. It can help your doctor find and treat any problems you have. But it's also important for preventing health problems.  A routine physical is different from a \"sick visit.\" A sick visit is when you see a doctor because of a health concern or problem. Since physicals are scheduled ahead of time, you can think about what you want to ask the doctor.  How often should I get a physical? -- It depends on your age and health. In general, for people age 21 years and older:   If you are younger than 50 years, you might be able to get a physical every 3 years.   If you are 50 years or older, your doctor might recommend a physical every year.  If you have an ongoing health condition, like diabetes or high blood pressure, your doctor will probably want to see you more often.  What happens during a physical? -- In general, each visit will include:   Physical exam - The doctor or nurse will check your height, weight, heart rate, and blood pressure. They will also look at your eyes and ears. They will ask about how you are feeling and whether you have any symptoms that bother you.   Medicines - It's a good idea to bring a list of all the medicines you take to each doctor visit. Your doctor will talk to you about your medicines and answer any questions. Tell them if you are having any side effects that bother you. You " "should also tell them if you are having trouble paying for any of your medicines.   Habits and behaviors - This includes:   Your diet   Your exercise habits   Whether you smoke, drink alcohol, or use drugs   Whether you are sexually active   Whether you feel safe at home  Your doctor will talk to you about things you can do to improve your health and lower your risk of health problems. They will also offer help and support. For example, if you want to quit smoking, they can give you advice and might prescribe medicines. If you want to improve your diet or get more physical activity, they can help you with this, too.   Lab tests, if needed - The tests you get will depend on your age and situation. For example, your doctor might want to check your:   Cholesterol   Blood sugar   Iron level   Vaccines - The recommended vaccines will depend on your age, health, and what vaccines you already had. Vaccines are very important because they can prevent certain serious or deadly infections.   Discussion of screening - \"Screening\" means checking for diseases or other health problems before they cause symptoms. Your doctor can recommend screening based on your age, risk, and preferences. This might include tests to check for:   Cancer, such as breast, prostate, cervical, ovarian, colorectal, prostate, lung, or skin cancer   Sexually transmitted infections, such as chlamydia and gonorrhea   Mental health conditions like depression and anxiety  Your doctor will talk to you about the different types of screening tests. They can help you decide which screenings to have. They can also explain what the results might mean.   Answering questions - The physical is a good time to ask the doctor or nurse questions about your health. If needed, they can refer you to other doctors or specialists, too.  Adults older than 65 years often need other care, too. As you get older, your doctor will talk to you about:   How to prevent falling at " home   Hearing or vision tests   Memory testing   How to take your medicines safely   Making sure that you have the help and support you need at home  All topics are updated as new evidence becomes available and our peer review process is complete.  This topic retrieved from [employer] on: May 02, 2024.  Topic 489201 Version 1.0  Release: 32.4.3 - C32.122  © 2024 UpToDate, Inc. and/or its affiliates. All rights reserved.  Consumer Information Use and Disclaimer   Disclaimer: This generalized information is a limited summary of diagnosis, treatment, and/or medication information. It is not meant to be comprehensive and should be used as a tool to help the user understand and/or assess potential diagnostic and treatment options. It does NOT include all information about conditions, treatments, medications, side effects, or risks that may apply to a specific patient. It is not intended to be medical advice or a substitute for the medical advice, diagnosis, or treatment of a health care provider based on the health care provider's examination and assessment of a patient's specific and unique circumstances. Patients must speak with a health care provider for complete information about their health, medical questions, and treatment options, including any risks or benefits regarding use of medications. This information does not endorse any treatments or medications as safe, effective, or approved for treating a specific patient. UpToDate, Inc. and its affiliates disclaim any warranty or liability relating to this information or the use thereof.The use of this information is governed by the Terms of Use, available at https://www.woltersSontrauwer.com/en/know/clinical-effectiveness-terms. 2024© UpToDate, Inc. and its affiliates and/or licensors. All rights reserved.  Copyright   © 2024 UpToDate, Inc. and/or its affiliates. All rights reserved.    Patient Education     Routine physical for adults   The Basics   Written by the  "doctors and editors at [facility]   What is a physical? -- A physical is a routine visit, or \"check-up,\" with your doctor. You might also hear it called a \"wellness visit\" or \"preventive visit.\"  During each visit, the doctor will:   Ask about your physical and mental health   Ask about your habits, behaviors, and lifestyle   Do an exam   Give you vaccines if needed   Talk to you about any medicines you take   Give advice about your health   Answer your questions  Getting regular check-ups is an important part of taking care of your health. It can help your doctor find and treat any problems you have. But it's also important for preventing health problems.  A routine physical is different from a \"sick visit.\" A sick visit is when you see a doctor because of a health concern or problem. Since physicals are scheduled ahead of time, you can think about what you want to ask the doctor.  How often should I get a physical? -- It depends on your age and health. In general, for people age 21 years and older:   If you are younger than 50 years, you might be able to get a physical every 3 years.   If you are 50 years or older, your doctor might recommend a physical every year.  If you have an ongoing health condition, like diabetes or high blood pressure, your doctor will probably want to see you more often.  What happens during a physical? -- In general, each visit will include:   Physical exam - The doctor or nurse will check your height, weight, heart rate, and blood pressure. They will also look at your eyes and ears. They will ask about how you are feeling and whether you have any symptoms that bother you.   Medicines - It's a good idea to bring a list of all the medicines you take to each doctor visit. Your doctor will talk to you about your medicines and answer any questions. Tell them if you are having any side effects that bother you. You should also tell them if you are having trouble paying for any of your " "medicines.   Habits and behaviors - This includes:   Your diet   Your exercise habits   Whether you smoke, drink alcohol, or use drugs   Whether you are sexually active   Whether you feel safe at home  Your doctor will talk to you about things you can do to improve your health and lower your risk of health problems. They will also offer help and support. For example, if you want to quit smoking, they can give you advice and might prescribe medicines. If you want to improve your diet or get more physical activity, they can help you with this, too.   Lab tests, if needed - The tests you get will depend on your age and situation. For example, your doctor might want to check your:   Cholesterol   Blood sugar   Iron level   Vaccines - The recommended vaccines will depend on your age, health, and what vaccines you already had. Vaccines are very important because they can prevent certain serious or deadly infections.   Discussion of screening - \"Screening\" means checking for diseases or other health problems before they cause symptoms. Your doctor can recommend screening based on your age, risk, and preferences. This might include tests to check for:   Cancer, such as breast, prostate, cervical, ovarian, colorectal, prostate, lung, or skin cancer   Sexually transmitted infections, such as chlamydia and gonorrhea   Mental health conditions like depression and anxiety  Your doctor will talk to you about the different types of screening tests. They can help you decide which screenings to have. They can also explain what the results might mean.   Answering questions - The physical is a good time to ask the doctor or nurse questions about your health. If needed, they can refer you to other doctors or specialists, too.  Adults older than 65 years often need other care, too. As you get older, your doctor will talk to you about:   How to prevent falling at home   Hearing or vision tests   Memory testing   How to take your " medicines safely   Making sure that you have the help and support you need at home  All topics are updated as new evidence becomes available and our peer review process is complete.  This topic retrieved from [employer] on: May 02, 2024.  Topic 380572 Version 1.0  Release: 32.4.3 - C32.122  © 2024 UpToDate, Inc. and/or its affiliates. All rights reserved.  Consumer Information Use and Disclaimer   Disclaimer: This generalized information is a limited summary of diagnosis, treatment, and/or medication information. It is not meant to be comprehensive and should be used as a tool to help the user understand and/or assess potential diagnostic and treatment options. It does NOT include all information about conditions, treatments, medications, side effects, or risks that may apply to a specific patient. It is not intended to be medical advice or a substitute for the medical advice, diagnosis, or treatment of a health care provider based on the health care provider's examination and assessment of a patient's specific and unique circumstances. Patients must speak with a health care provider for complete information about their health, medical questions, and treatment options, including any risks or benefits regarding use of medications. This information does not endorse any treatments or medications as safe, effective, or approved for treating a specific patient. UpToDate, Inc. and its affiliates disclaim any warranty or liability relating to this information or the use thereof.The use of this information is governed by the Terms of Use, available at https://www.woltersEspial Groupuwer.com/en/know/clinical-effectiveness-terms. 2024© UpToDate, Inc. and its affiliates and/or licensors. All rights reserved.  Copyright   © 2024 UpToDate, Inc. and/or its affiliates. All rights reserved.

## 2025-04-28 NOTE — ASSESSMENT & PLAN NOTE
Prior Authorization Clinical Questions for Weight Management Pharmacotherapy    1. Does the patient have a contrainidcation to medication prescribed for weight management?: No  2. Does the patient have a diagnosis of obesity, confirmed by a BMI greater than or equal to 30 kg/m^2?: Yes  3. Does the patient have a BMI of greater than or equal to 27 kg/m^2 with at least one weight-related comorbidity/risk factor/complication (e.g. diabetes, dyslipidemia, coronary artery disease)?: Yes  4. Weight-related co-morbidities/risk factors: dyslipidemia, obstructive sleep apnea (JAZMYN)  6. ZEPBOUND JAZMYN Indication: Does patient have documented JAZMYN diagnosed via sleep study (insurance will require copy of sleep study results for approval)?: Yes  9. Does the patient have a history of type 2 diabetes?: No     Baseline weight (in pounds): 291 lbs  Current weight (in pounds): 238 lbs  Weight loss percentage: -18.21%         Orders:  •  Lipid panel; Future  •  CBC and differential; Future  •  Comprehensive metabolic panel; Future  •  Hemoglobin A1C; Future

## 2025-04-28 NOTE — PROGRESS NOTES
Adult Annual Physical  Name: Tashi Alexander      : 1955      MRN: [id number]  Encounter Provider: Lea Reyes, MD  Encounter Date: 2025   [facility]    :  Assessment & Plan  Annual physical exam         Screening PSA (prostate specific antigen)    Orders:  •  PSA, Total Screen; Future    Impaired fasting glucose    Orders:  •  Comprehensive metabolic panel; Future  •  Hemoglobin A1C; Future    Dyslipidemia    Orders:  •  Lipid panel; Future  •  Comprehensive metabolic panel; Future    JAZMYN (obstructive sleep apnea)  Compliant with CPAP but machine is very noisy       Morbid obesity with BMI of 40.0-44.9, adult (HCC)  Prior Authorization Clinical Questions for Weight Management Pharmacotherapy    1. Does the patient have a contrainidcation to medication prescribed for weight management?: No  2. Does the patient have a diagnosis of obesity, confirmed by a BMI greater than or equal to 30 kg/m^2?: Yes  3. Does the patient have a BMI of greater than or equal to 27 kg/m^2 with at least one weight-related comorbidity/risk factor/complication (e.g. diabetes, dyslipidemia, coronary artery disease)?: Yes  4. Weight-related co-morbidities/risk factors: dyslipidemia, obstructive sleep apnea (JAZMYN)  6. ZEPBOUND JAZMYN Indication: Does patient have documented JAZMYN diagnosed via sleep study (insurance will require copy of sleep study results for approval)?: Yes  9. Does the patient have a history of type 2 diabetes?: No     Baseline weight (in pounds): 291 lbs  Current weight (in pounds): 238 lbs  Weight loss percentage: -18.21%         Orders:  •  Lipid panel; Future  •  CBC and differential; Future  •  Comprehensive metabolic panel; Future  •  Hemoglobin A1C; Future    Annual physical exam               Preventive Screenings:  - Diabetes Screening: screening up-to-date  - Hepatitis C screening: screening up-to-date   - Colon cancer screening: screening up-to-date   - Lung cancer  "screening: screening not indicated   - Prostate cancer screening: screening up-to-date   - AAA screening: screening not indicated          History of Present Illness     Adult Annual Physical:  Patient presents for annual physical.     Diet and Physical Activity:  - Diet/Nutrition: portion control. limited snacks  - Exercise:. 2-3 days a week    Depression Screening:  - PHQ-2 Score: 0    General Health:  - Sleep: uses CPAP machine.  - Hearing: normal hearing bilateral ears.  - Vision: wears glasses and most recent eye exam < 1 year ago.  - Dental: regular dental visits.     Health:    - Urinary symptoms: none.     Review of Systems   Constitutional:  Negative for unexpected weight change.   Respiratory:  Negative for shortness of breath.    Cardiovascular:  Negative for chest pain and palpitations.   Gastrointestinal:  Positive for constipation (mild). Negative for abdominal pain.   Genitourinary:  Negative for difficulty urinating.   Neurological:  Negative for dizziness and headaches.         Objective   /78 (BP Location: Left arm, Patient Position: Sitting, Cuff Size: Standard)   Pulse 73   Temp (!) 97.2 °F (36.2 °C) (Tympanic)   Ht 5' 9\" (1.753 m)   Wt 108 kg (238 lb 6.4 oz)   SpO2 97%   BMI 35.21 kg/m²     Physical Exam  Constitutional:       Appearance: He is well-developed. He is not ill-appearing.   HENT:      Right Ear: Tympanic membrane and ear canal normal.      Left Ear: Tympanic membrane and ear canal normal.   Eyes:      Conjunctiva/sclera: Conjunctivae normal.   Cardiovascular:      Rate and Rhythm: Normal rate and regular rhythm.      Heart sounds: Normal heart sounds. No murmur heard.  Pulmonary:      Effort: Pulmonary effort is normal. No respiratory distress.      Breath sounds: Normal breath sounds. No wheezing or rales.   Abdominal:      General: There is no distension.      Palpations: Abdomen is soft. There is no mass.      Tenderness: There is no abdominal tenderness. There is " no guarding or rebound.   Musculoskeletal:      Cervical back: Neck supple.      Right lower leg: No edema.      Left lower leg: No edema.   Neurological:      Mental Status: He is alert and oriented to person, place, and time.   Psychiatric:         Mood and Affect: Mood normal.         Behavior: Behavior normal.         Thought Content: Thought content normal.         Judgment: Judgment normal.

## 2025-04-29 DIAGNOSIS — J30.2 SEASONAL ALLERGIES: ICD-10-CM

## 2025-04-29 RX ORDER — AZELASTINE HYDROCHLORIDE 137 UG/1
SPRAY, METERED NASAL
Qty: 90 ML | Refills: 1 | Status: SHIPPED | OUTPATIENT
Start: 2025-04-29

## 2025-05-08 ENCOUNTER — CONSULT (OUTPATIENT)
Age: 70
End: 2025-05-08
Attending: INTERNAL MEDICINE
Payer: COMMERCIAL

## 2025-05-08 VITALS — WEIGHT: 232 LBS | BODY MASS INDEX: 34.26 KG/M2 | TEMPERATURE: 97.6 F

## 2025-05-08 DIAGNOSIS — L82.1 SEBORRHEIC KERATOSES: Primary | ICD-10-CM

## 2025-05-08 DIAGNOSIS — D22.5 MULTIPLE BENIGN MELANOCYTIC NEVI OF UPPER AND LOWER EXTREMITIES AND TRUNK: ICD-10-CM

## 2025-05-08 DIAGNOSIS — D22.72 MULTIPLE BENIGN MELANOCYTIC NEVI OF UPPER AND LOWER EXTREMITIES AND TRUNK: ICD-10-CM

## 2025-05-08 DIAGNOSIS — D23.9 DERMATOFIBROMA: ICD-10-CM

## 2025-05-08 DIAGNOSIS — L81.4 LENTIGINES: ICD-10-CM

## 2025-05-08 DIAGNOSIS — D22.62 MULTIPLE BENIGN MELANOCYTIC NEVI OF UPPER AND LOWER EXTREMITIES AND TRUNK: ICD-10-CM

## 2025-05-08 DIAGNOSIS — D18.01 CHERRY ANGIOMA: ICD-10-CM

## 2025-05-08 DIAGNOSIS — Z12.83 SKIN CANCER SCREENING: ICD-10-CM

## 2025-05-08 DIAGNOSIS — D22.71 MULTIPLE BENIGN MELANOCYTIC NEVI OF UPPER AND LOWER EXTREMITIES AND TRUNK: ICD-10-CM

## 2025-05-08 DIAGNOSIS — D48.9 NEOPLASM OF UNCERTAIN BEHAVIOR: ICD-10-CM

## 2025-05-08 DIAGNOSIS — D22.61 MULTIPLE BENIGN MELANOCYTIC NEVI OF UPPER AND LOWER EXTREMITIES AND TRUNK: ICD-10-CM

## 2025-05-08 PROCEDURE — 88305 TISSUE EXAM BY PATHOLOGIST: CPT | Performed by: STUDENT IN AN ORGANIZED HEALTH CARE EDUCATION/TRAINING PROGRAM

## 2025-05-08 PROCEDURE — 99204 OFFICE O/P NEW MOD 45 MIN: CPT | Performed by: REGISTERED NURSE

## 2025-05-08 PROCEDURE — 88342 IMHCHEM/IMCYTCHM 1ST ANTB: CPT | Performed by: STUDENT IN AN ORGANIZED HEALTH CARE EDUCATION/TRAINING PROGRAM

## 2025-05-08 PROCEDURE — 11102 TANGNTL BX SKIN SINGLE LES: CPT | Performed by: REGISTERED NURSE

## 2025-05-08 PROCEDURE — 88341 IMHCHEM/IMCYTCHM EA ADD ANTB: CPT | Performed by: STUDENT IN AN ORGANIZED HEALTH CARE EDUCATION/TRAINING PROGRAM

## 2025-05-08 NOTE — PROGRESS NOTES
"[facility] Dermatology Clinic Note     Patient Name: Tashi Alexander  Encounter Date: 05/08/2025       Have you been cared for by a [facility] Dermatologist in the last 3 years and, if so, which description applies to you? NO. I am considered a \"new\" patient and must complete all patient intake questions. I am not of child-bearing potential.     REVIEW OF SYSTEMS:  Have you recently had or currently have any of the following? Recent fever or chills? No  Any non-healing wound? No   PAST MEDICAL HISTORY:  Have you personally ever had or currently have any of the following?  If \"YES,\" then please provide more detail. Skin cancer (such as Melanoma, Basal Cell Carcinoma, Squamous Cell Carcinoma?  No  Tuberculosis, HIV/AIDS, Hepatitis B or C: No  Radiation Treatment No   HISTORY OF IMMUNOSUPPRESSION:   Do you have a history of any of the following:  Systemic Immunosuppression such as Diabetes, Biologic or Immunotherapy, Chemotherapy, Organ Transplantation, Bone Marrow Transplantation or Prednisone?  No     Answering \"YES\" requires the addition of the dotphrase \"IMMUNOSUPPRESSED\" as the first diagnosis of the patient's visit.   FAMILY HISTORY:  Any \"first degree relatives\" (parent, brother, sister, or child) with the following?    Skin Cancer, Pancreatic or Other Cancer? YES, father - lung cancer   PATIENT EXPERIENCE:    Do you want the Dermatologist to perform a COMPLETE skin exam today including a clinical examination under the \"bra and underwear\" areas?  Yes  If necessary, do we have your permission to call and leave a detailed message on your Preferred Phone number that includes your specific medical information?  Yes      Allergies   Allergen Reactions    Seasonal Ic [Cholestatin] Itching     Runny eyes and a cough sometimes      Current Outpatient Medications:     Azelastine HCl 137 MCG/SPRAY SOLN, SPRAY 1 SPRAY INTO EACH NOSTRIL TWICE A DAY, Disp: 90 mL, Rfl: 1    naproxen (NAPROSYN) 500 mg tablet, Take 1 tablet (500 " "mg total) by mouth 2 (two) times a day as needed for moderate pain, Disp: 180 tablet, Rfl: 1    tadalafil (CIALIS) 20 MG tablet, Take 1 tablet (20 mg total) by mouth daily as needed for erectile dysfunction, Disp: 30 tablet, Rfl: 3    tirzepatide (Zepbound) 15 mg/0.5 mL auto-injector, Inject 0.5 mL (15 mg total) under the skin once a week, Disp: 2 mL, Rfl: 5          Whom besides the patient is providing clinical information about today's encounter?   NO ADDITIONAL HISTORIAN (patient alone provided history)    Physical Exam and Assessment/Plan by Diagnosis:    CHERRY ANGIOMAS     Physical Exam:  Anatomic Location Affected:  Trunk and extremities  Morphological Description:  Scattered cherry red papules  Denies pain, itch, bleeding. No treatments tried. Present for years. Present constantly; no modifying factors which make it worse or better.     Assessment and Plan:  Based on a thorough discussion of this condition and the management approach to it (including a comprehensive discussion of the known risks, side effects and potential benefits of treatment), the patient (family) agrees to implement the following specific plan:  Reassure benign        SEBORRHEIC KERATOSIS; NON-INFLAMED     Physical Exam:  Anatomic Location Affected:  Trunk and extremities  Morphological Description:  Waxy, smooth to warty textured, yellow to brownish-grey to dark brown to blackish, discrete, \"stuck-on\" appearing papules.  Present for years. Denies pain, itch, bleeding.      Additional History of Present Condition:  Present constantly; no modifying factors which make it worse or better. No prior treatment.       Assessment and Plan:  Based on a thorough discussion of this condition and the management approach to it (including a comprehensive discussion of the known risks, side effects and potential benefits of treatment), the patient (family) agrees to implement the following specific plan:  Reassure benign  Use sun protection.  Apply " "SPF 30 or higher at least three times a day.  Wear sun protecting clothing and hats.        SOLAR LENTIGINES   OTHER SKIN CHANGES DUE TO CHRONIC EXPOSURE TO NONIONIZING RADIATION     Physical Exam:  Anatomic Location Affected:  Sun exposed areas of back, chest, arms, legs  Morphological Description:  Multiple scattered brown to tan evenly pigmented macules   Denies pain, itch, bleeding. No treatments tried. Present for months - years. Reports getting newer lesions with sun exposure.         Assessment and Plan:  Based on a thorough discussion of this condition and the management approach to it (including a comprehensive discussion of the known risks, side effects and potential benefits of treatment), the patient (family) agrees to implement the following specific plan:  Reassure benign  Use sun protection.  Apply SPF 30 or higher at least three times a day.  Wear sun protecting clothing and hats.         MULTIPLE MELANOCYTIC NEVI (\"Moles\")     Physical Exam:  Anatomic Location Affected: Trunk and extremities  Morphological Description:  Scattered, round to ovoid, symmetrical-appearing, even bordered, skin colored to dark brown macules/papules  Denies pain, itch, bleeding. No treatments tried. Present for years. Present constantly; no modifying factors which make it worse or better. Denies actively changing or growing moles.      Assessment and Plan:  Based on a thorough discussion of this condition and the management approach to it (including a comprehensive discussion of the known risks, side effects and potential benefits of treatment), the patient (family) agrees to implement the following specific plan:  Reassure benign  Monitor for changes  Use sun protection.  Apply SPF 30 or higher at least three times a day.  Wear sun protecting clothing and hats.       Worrisome signs of skin malignancy discussed, questions answered. Regular self-skin check discussed. Advised to call or return to office if patient notices " "any spots of concern, rapidly growing/changing lesions, bleeding lesions, non-healing lesions. Advised regular SPF use.     DERMATOFIBROMA    Physical Exam:  Anatomic Location Affected:  right lower leg  Morphological Description: fibrous nodule   Pertinent Positives:  Pertinent Negatives:    Additional History of Present Condition:  present on exam. Patient states possible scar from a bug bite.     Assessment and Plan:  Based on a thorough discussion of this condition and the management approach to it (including a comprehensive discussion of the known risks, side effects and potential benefits of treatment), the patient (family) agrees to implement the following specific plan:  Reassured benign.     Assessment and Plan:  A dermatofibroma is a common benign fibrous nodule that most often arises on the skin of the lower legs.  A dermatofibroma is also called a \"cutaneous fibrous histiocytoma.\"  Dermatofibromas occur at all ages and in people of every ethnicity. They are more common in women than in men.    It is not clear if dermatofibroma is a reactive process or if it is a neoplasm. The lesions are made up of proliferating fibroblasts. Histiocytes may also be involved.  They are sometimes attributed to an insect bite or ingrownhair or local trauma, but not consistently. They may be more numerous in patients with altered immunity.    Dermatofibromas most often occur on the legs and arms, but may also arise on the trunk or any site of the body.  Typical clinical features include the following:  People may have 1 or up to 15 lesions.  Size varies from 0.5-1.5 cm diameter; most lesions are 7-10 mm diameter.  They are firm nodules tethered to the skin surface and mobile over subcutaneous tissue.  The skin \"dimples\" on pinching the lesion.  Color may be pink to light brown in white skin, and dark brown to black in dark skin; some appear paler in the center.  They do not usually cause symptoms, but they are sometimes " "painful or itchy.  Because they are often raised lesions, they may be traumatized, for example by a razor.  Occasionally dozens may erupt within a few months, usually in the setting of immunosuppression (for example autoimmune disease, cancer or certain medications).  Dermatofibroma does not give rise to cancer. However, occasionally, it may be mistaken for dermatofibrosarcoma or desmoplastic melanoma.    A dermatofibroma is harmless and seldom causes any symptoms. Usually, only reassurance is needed. If it is nuisance or causing concern, the lesion can be removed surgically, resulting in a scar that is, by definition, usually longer in diameter than the widest portion of the dermatofibroma.  Cryotherapy, shave biopsy and laser surgery are rarely completely successful.  Skin punch biopsy or incisional biopsy may be undertaken if there is an atypical feature such as recent enlargement, ulceration, or asymmetrical structures and colours on dermatoscopy.     XEROSIS (\"DRY SKIN\")    Physical Exam:  Anatomic Location Affected:  trunk and extremities   Morphological Description:  dryness of skin  Pertinent Positives:  Pertinent Negatives:    Additional History of Present Condition:  present on exam. Patient states he has been using CeraVe and dead sea mineral cream.      Assessment and Plan:  Based on a thorough discussion of this condition and the management approach to it (including a comprehensive discussion of the known risks, side effects and potential benefits of treatment), the patient (family) agrees to implement the following specific plan:  Recommend moisturizing twice daily with moisturizers from brands such as CeraVe, Cetaphil, Eucerin or Vanicream.      NEOPLASM OF UNCERTAIN BEHAVIOR OF SKIN    Physical Exam:  (Anatomic Location); (Size and Morphological Description); (Differential Diagnosis):  A; mid lower back; 0.6 cm pink brown macule; nevi r/o atypia   Pertinent Positives:  Pertinent " "Negatives:    Additional History of Present Condition:  present on exam.     Assessment and Plan:  I have discussed with the patient that a sample of skin via a \"skin biopsy” would be potentially helpful to further make a specific diagnosis under the microscope.  Based on a thorough discussion of this condition and the management approach to it (including a comprehensive discussion of the known risks, side effects and potential benefits of treatment), the patient (family) agrees to implement the following specific plan:    Procedure:  Skin Biopsy.  After a thorough discussion of treatment options and risk/benefits/alternatives (including but not limited to local pain, scarring, dyspigmentation, blistering, possible superinfection, and inability to confirm a diagnosis via histopathology), verbal and written consent were obtained and portion of the rash was biopsied for tissue sample.  See below for consent that was obtained from patient and subsequent Procedure Note.    PROCEDURE TANGENTIAL (SHAVE) BIOPSY NOTE:    Performing Physician:  Dr. Mena  Anatomic Location; Clinical Description with size (cm); Pre-Op Diagnosis:   A; mid lower back; 0.6 cm pink brown macule; nevi r/o atypia   Post-op diagnosis: Same     Local anesthesia: 1% xylocaine with epi      Topical anesthesia: None    Hemostasis: Aluminum chloride       After obtaining informed consent  at which time there was a discussion about the purpose of biopsy  and low risks of infection and bleeding.  The area was prepped and draped in the usual fashion. Anesthesia was obtained with 1% lidocaine with epinephrine. A shave biopsy to an appropriate sampling depth was obtained by Shave (Dermablade or 15 blade) The resulting wound was covered with surgical ointment and bandaged appropriately.     The patient tolerated the procedure well without complications and was without signs of functional compromise.      Specimen has been sent for review by " "Dermatopathology.    Standard post-procedure care has been explained and has been included in written form within the patient's copy of Informed Consent.    INFORMED CONSENT DISCUSSION AND POST-OPERATIVE INSTRUCTIONS FOR PATIENT    I.  RATIONALE FOR PROCEDURE  I understand that a skin biopsy allows the Dermatologist to test a lesion or rash under the microscope to obtain a diagnosis.  It usually involves numbing the area with numbing medication and removing a small piece of skin; sometimes the area will be closed with sutures. In this specific procedure, sutures are not usually needed.  If any sutures are placed, then they are usually need to be removed in 2 weeks or less.    I understand that my Dermatologist recommends that a skin \"shave\" biopsy be performed today.  A local anesthetic, similar to the kind that a dentist uses when filling a cavity, will be injected with a very small needle into the skin area to be sampled.  The injected skin and tissue underneath \"will go to sleep” and become numb so no pain should be felt afterwards.  An instrument shaped like a tiny \"razor blade\" (shave biopsy instrument) will be used to cut a small piece of tissue and skin from the area so that a sample of tissue can be taken and examined more closely under the microscope.  A slight amount of bleeding will occur, but it will be stopped with direct pressure and a pressure bandage and any other appropriate methods.  I understands that a scar will form where the wound was created.  Surgical ointment will be applied to help protect the wound.  Sutures are not usually needed.    II.  RISKS AND POTENTIAL COMPLICATIONS   I understand the risks and potential complications of a skin biopsy include but are not limited to the following:  Bleeding  Infection  Pain  Scar/keloid  Skin discoloration  Incomplete Removal  Recurrence  Nerve Damage/Numbness/Loss of Function  Allergic Reaction to Anesthesia  Biopsies are diagnostic procedures and " "based on findings additional treatment or evaluation may be required  Loss or destruction of specimen resulting in no additional findings    My Dermatologist has explained to me the nature of the condition, the nature of the procedure, and the benefits to be reasonably expected compared with alternative approaches.  My Dermatologist has discussed the likelihood of major risks or complications of this procedure including the specific risks listed above, such as bleeding, infection, and scarring/keloid.  I understand that a scar is expected after this procedure.  I understand that my physician cannot predict if the scar will form a \"keloid,\" which extends beyond the borders of the wound that is created.  A keloid is a thick, painful, and bumpy scar.  A keloid can be difficult to treat, as it does not always respond well to therapy, which includes injecting cortisone directly into the keloid every few weeks.  While this usually reduces the pain and size of the scar, it does not eliminate it.      I understand that photographs may be taken before and after the procedure.  These will be maintained as part of the medical providers confidential records and may not be made available to me.  I further authorize the medical provider to use the photographs for teaching purposes or to illustrate scientific papers, books, or lectures if in his/her judgment, medical research, education, or science may benefit from its use.    I have had an opportunity to fully inquire about the risks and benefits of this procedure and its alternatives.   I have been given ample time and opportunity to ask questions and to seek a second opinion if I wished to do so.  I acknowledge that there have specifically been no guarantees as to the cosmetic results from the procedure.  I am aware that with any procedure there is always the possibility of an unexpected complication.    III. POST-PROCEDURAL CARE (WHAT YOU WILL NEED TO DO \"AFTER THE BIOPSY\" TO " "OPTIMIZE HEALING)    Keep the area clean and dry.  Try NOT to remove the bandage or get it wet for the first 24 hours.    Gently clean the area and apply surgical ointment (such as Vaseline petrolatum ointment, which is available \"over the counter\" and not a prescription) to the biopsy site for up to 2 weeks straight.  This acts to protect the wound from the outside world.      Sutures are not usually placed in this procedure.  If any sutures were placed, return for suture removal as instructed (generally 1 week for the face, 2 weeks for the body).      Take Acetaminophen (Tylenol) for discomfort, if no contraindications.  Ibuprofen or aspirin could make bleeding worse.    Call our office immediately for signs of infection: fever, chills, increased redness, warmth, tenderness, discomfort/pain, or pus or foul smell coming from the wound.    WHAT TO DO IF THERE IS ANY BLEEDING?  If a small amount of bleeding is noticed, place a clean cloth over the area and apply firm pressure for ten minutes.  Check the wound after 10 minutes of direct pressure.  If bleeding persists, try one more time for an additional 10 minutes of direct pressure on the area.  If the bleeding becomes heavier or does not stop after the second attempt, or if you have any other questions about this procedure, then please call your [facility] Dermatologist by calling [phone] (SKIN).     I hereby acknowledge that I have reviewed and verified the site with my Dermatologist and have requested and authorized my Dermatologist to proceed with the procedure.      Scribe Attestation      I,:  Ciara Hammer MA am acting as a scribe while in the presence of the attending physician.:       I,:  Eamon Mena MD personally performed the services described in this documentation    as scribed in my presence.:             "

## 2025-05-14 PROCEDURE — 88342 IMHCHEM/IMCYTCHM 1ST ANTB: CPT | Performed by: STUDENT IN AN ORGANIZED HEALTH CARE EDUCATION/TRAINING PROGRAM

## 2025-05-14 PROCEDURE — 88305 TISSUE EXAM BY PATHOLOGIST: CPT | Performed by: STUDENT IN AN ORGANIZED HEALTH CARE EDUCATION/TRAINING PROGRAM

## 2025-05-14 PROCEDURE — 88341 IMHCHEM/IMCYTCHM EA ADD ANTB: CPT | Performed by: STUDENT IN AN ORGANIZED HEALTH CARE EDUCATION/TRAINING PROGRAM

## 2025-05-15 ENCOUNTER — RESULTS FOLLOW-UP (OUTPATIENT)
Age: 70
End: 2025-05-15

## 2025-05-15 NOTE — RESULT ENCOUNTER NOTE
DERMATOPATHOLOGY RESULT NOTE    Results reviewed by ordering physician.  Called patient to personally discuss results. Discussed results with patient.       Instructions for Clinical Derm Team:   (remember to route Result Note to appropriate staff):    None    Result & Plan by Specimen:    Specimen A: benign  Plan: reassured, benign      A. Skin, mid lower back, shave biopsy:     Compound melanocytic nevus; not seen at examined inked specimen margins (see note).     Note: SOX10, MART-1, and PRAME immunostains were reviewed; significant melanocytic architectural disorder is not seen, and the lesional cells are negative for PRAME. Multiple levels examined. While the lesion is not seen at examined inked specimen margins, margin evaluation of biopsy specimens is occasionally inadequate in excluding presence of lesion at margins; clinical pathological correlation is advised.         Electronically signed by Danyelle Zapata MD on 5/14/2025 at 1821 EDT

## 2025-06-24 DIAGNOSIS — E66.01 MORBID OBESITY WITH BMI OF 40.0-44.9, ADULT (HCC): ICD-10-CM

## 2025-06-25 DIAGNOSIS — E66.01 MORBID OBESITY WITH BMI OF 40.0-44.9, ADULT (HCC): ICD-10-CM

## 2025-06-25 RX ORDER — TIRZEPATIDE 15 MG/.5ML
15 INJECTION, SOLUTION SUBCUTANEOUS WEEKLY
Refills: 0 | OUTPATIENT
Start: 2025-06-25

## 2025-06-25 RX ORDER — TIRZEPATIDE 15 MG/.5ML
15 INJECTION, SOLUTION SUBCUTANEOUS WEEKLY
Qty: 2 ML | Refills: 5 | Status: SHIPPED | OUTPATIENT
Start: 2025-06-25 | End: 2025-06-25 | Stop reason: SDUPTHER

## 2025-06-25 RX ORDER — TIRZEPATIDE 15 MG/.5ML
15 INJECTION, SOLUTION SUBCUTANEOUS WEEKLY
Qty: 2 ML | Refills: 0 | Status: SHIPPED | OUTPATIENT
Start: 2025-06-25

## 2025-06-29 DIAGNOSIS — E66.01 MORBID OBESITY WITH BMI OF 40.0-44.9, ADULT (HCC): ICD-10-CM

## 2025-06-30 ENCOUNTER — TELEPHONE (OUTPATIENT)
Dept: INTERNAL MEDICINE CLINIC | Facility: CLINIC | Age: 70
End: 2025-06-30

## 2025-06-30 NOTE — TELEPHONE ENCOUNTER
PA for ZEPBOUND 15MG SUBMITTED to [employer]    via      [x][employer]-Case ID #     [x]PA sent as URGENT    All office notes, labs and other pertaining documents and studies sent. Clinical questions answered. Awaiting determination from insurance company.     Turnaround time for your insurance to make a decision on your Prior Authorization can take 7-21 business days.

## 2025-06-30 NOTE — TELEPHONE ENCOUNTER
[employer] [phone]  ID -[id number].  BIN -[id number]  N-[id number]  Group-[id number]    Patient states that he was suppose to take the injection last week.   Reason for call:   [x] Prior Auth  [] Other:     Caller:  [x] Patient  [] Pharmacy  Name:   Address:   Callback Number:     Medication: tirzepatide (Zepbound) 15 mg/0.5 mL auto-injector    Dose/Frequency:     Quantity: 2mL    Ordering Provider:   [x] PCP/Provider - [facility]  Authorized By: Lea Reyes, MD    [] Speciality/Provider -     Has the patient tried other medications and failed? If failed, which medications did they fail?    [x] No   [] Yes -     Is the patient's insurance updated in EPIC?   [] Yes   [] No     Is a copy of the patient's insurance scanned in EPIC?   [] Yes   [] No

## 2025-07-01 RX ORDER — TIRZEPATIDE 15 MG/.5ML
15 INJECTION, SOLUTION SUBCUTANEOUS WEEKLY
Qty: 2 ML | Refills: 0 | OUTPATIENT
Start: 2025-07-01

## 2025-07-01 NOTE — TELEPHONE ENCOUNTER
PA for ZEPBOUND 15MG APPROVED     Date(s) approved  to June 30, 2026         Patient advised by          [x]MyChart Message  []Phone call   []LMOM  []L/M to call office as no active Communication consent on file  []Unable to leave detailed message as VM not approved on Communication consent       Pharmacy advised by    [x]Fax  []Phone call  []Secure Chat

## 2025-07-23 ENCOUNTER — OFFICE VISIT (OUTPATIENT)
Dept: SLEEP CENTER | Facility: CLINIC | Age: 70
End: 2025-07-23
Payer: COMMERCIAL

## 2025-07-23 VITALS
HEART RATE: 67 BPM | SYSTOLIC BLOOD PRESSURE: 120 MMHG | OXYGEN SATURATION: 95 % | BODY MASS INDEX: 34.8 KG/M2 | HEIGHT: 69 IN | WEIGHT: 235 LBS | DIASTOLIC BLOOD PRESSURE: 70 MMHG

## 2025-07-23 DIAGNOSIS — G47.34 SLEEP RELATED HYPOXIA: ICD-10-CM

## 2025-07-23 DIAGNOSIS — G47.33 OSA (OBSTRUCTIVE SLEEP APNEA): Primary | ICD-10-CM

## 2025-07-23 DIAGNOSIS — J30.2 SEASONAL ALLERGIES: ICD-10-CM

## 2025-07-23 DIAGNOSIS — F51.12 INSUFFICIENT SLEEP SYNDROME: ICD-10-CM

## 2025-07-23 DIAGNOSIS — E66.9 OBESITY (BMI 30-39.9): ICD-10-CM

## 2025-07-23 DIAGNOSIS — Z71.89 CPAP USE COUNSELING: ICD-10-CM

## 2025-07-23 PROCEDURE — 99214 OFFICE O/P EST MOD 30 MIN: CPT | Performed by: INTERNAL MEDICINE

## 2025-07-23 NOTE — PROGRESS NOTES
Name: Tashi Alexander      : 1955      MRN: [id number]  Encounter Provider: Edi Mcneal MD  Encounter Date: 2025   Encounter department: [facility] [place]  :  Assessment & Plan  JAZMYN (obstructive sleep apnea)    Orders:    PAP DME Resupply/Reorder    Sleep related hypoxia         CPAP use counseling         Insufficient sleep syndrome         Seasonal allergies         Obesity (BMI 30-39.9)               PLAN: Above listed Problems & Comorbidities were Addressed this Visit.  Improved/stable/controlled/resolved conditions as reviewed in notes.   Results of prior studies and physiologic data reviewed  with the patient.   I discussed treatment options with risks and benefits.  He declined a repeat diagnostic study since he acknowledges he sleeps better when using BiPAP  Treatment with  PAP is medically necessary and Tashi is agreable to continue use.   Care of equipment, methods to improve comfort using PAP and importance of compliance with therapy were discussed.  Pressure setting:continue BiPAP auto 20/10/4 cmH2O. Mask type nasal  Rx provided to replace supplies and Care coordinated with DME provider.   Encouraged to persist with strategies for weight reduction.    I also advised allowing sufficient opportunity for sleep regularly targeting upwards of 7 hours.  Follow-up is advised in 1 year or sooner if needed to monitor progress, compliance and to adjust therapy.        History of Present Illness   HPI          Follow-Up Note - Sleep Center   Tashi Alexander  69 y.o. male  :1955  MRN:[id number]  DOS:2025    CC: I saw this patient for follow-up in clinic today for Sleep disordered breathing, Coexisting Sleep and Medical Problems.  Patient is using a ResMed machine.. Interval changes: Slightly improved improved compliance with BiPAP      Results of prior studies in 2015: The diagnostic study demonstrated AHI of 69 per hour. Minimum oxygen saturation was 70% any spent is  96.8% of time asleep with saturations below 90%. During the subsequent therapeutic study, sleep disordered breathing was adequately remediated with BiPAP at 19/14 cm H2O.      PFSH, Problem List, Medications & Allergies were reviewed in EMR.   He  has a past medical history of Allergic (2015), Anxiety (06/2022), CPAP (continuous positive airway pressure) dependence, Eating disorder (adulthood), Elevated serum creatinine, Ingrown toenail (2004), Morbid obesity (HCC) (01/11/2016), Obesity, Right hand pain (03/07/2022), Screening PSA (prostate specific antigen) (04/13/2018), Sebaceous cyst (06/12/2023), Kilpatrick splints (1970), Sleep apnea, and Tinnitus, left.    He has a current medication list which includes the following prescription(s): azelastine hcl, naproxen, tadalafil, zepbound, and zepbound.    PHYSIOLOGICAL DATA REVIEW : Device has been used 29/30 days and average on days used is 4 hours and 47 minutes.  Using PAP > 4 hours/night 70%. Estimated SHUN 0.8/hour with pressure of 16.4/12.4cm H2O@90th/95th percentile;.  INTERPRETATION: Compliance is reasonable; Pressure setting is:within target range; ;     SUBJECTIVE: With respect to use of PAP, Tashi  is experiencing no adverse effects: mask leaks  and mask discomfort that causes him to remove the mask when sleep is interrupted.He derives benefit.  Acknowledges sleep is better when using BiPAP.. Is satisfied with sleep and daytime function.     Sleep Routine: Tashi reports getting (Patient-Rptd) (P) 7 hrs sleep; he has no difficulty initiating and maintaining sleep . He arises spontaneously and (Patient-Rptd) (P) Usually feels refreshed.Tashi (Patient-Rptd) (P) Rarely]denies excessive daytime sleepiness,.  He rated himself at Total score: (Patient-Rptd) (P) 4 /24 on the [place] Sleepiness Scale.   Other issues: None reported.     Habits: Reports that he has never smoked. He has never used smokeless tobacco.,  Reports current alcohol use.,  Reports no history of  "drug use., Caffeine use: limited; Exercise routine: regular.      ROS: Significant for around 60 pounds weight loss since starting Zepbound a few months ago.  Allergies and pain are controlled.  On current medications..    EXAM: /70 (BP Location: Left arm, Patient Position: Sitting, Cuff Size: Large)   Pulse 67   Ht 5' 9\" (1.753 m)   Wt 107 kg (235 lb)   SpO2 95%   BMI 34.70 kg/m²     Wt Readings from Last 3 Encounters:   07/23/25 107 kg (235 lb)   05/08/25 105 kg (232 lb)   04/28/25 108 kg (238 lb 6.4 oz)      Patient is well groomed; well appearing.   CNS: Alert, orientated, speech clear & coherent  Psych: cooperative and in no distress. Mental state: Appears normal.  H&N: EOMI; NC/AT: No facial pressure marks, no rashes.    Skin/Extrem: col & hydration normal; no edema  Resp: Respiratory effort is normal  Physical findings otherwise essentially unchanged from previous.    Lab Results   Component Value Date     10/02/2017    SODIUM 140 04/22/2025    K 4.0 04/22/2025     04/22/2025    CO2 31 04/22/2025    AGAP 4 04/22/2025    BUN 20 04/22/2025    CREATININE 1.14 04/22/2025    GLUC 128 01/10/2018    GLUF 101 (H) 04/22/2025    CALCIUM 9.5 04/22/2025    AST 15 04/22/2025    ALT 18 04/22/2025    ALKPHOS 48 04/22/2025    PROT 6.9 10/02/2017    TP 6.9 04/22/2025    BILITOT 0.8 10/02/2017    TBILI 1.08 (H) 04/22/2025    EGFR 65 04/22/2025   ;   Lab Results   Component Value Date    WBC 6.86 04/22/2025    HGB 15.3 04/22/2025    HCT 43.2 04/22/2025    MCV 90 04/22/2025     04/22/2025   : @LOS@    Sincerely,     Authenticated electronically on 07/23/25   Board Certified Specialist     Portions of the record may have been created with voice recognition software. Occasional wrong word or \"sound a like\" substitutions may have occurred due to the inherent limitations of voice recognition software. There may also be notations and random deletions of words or characters from malfunctioning " software. Read the chart carefully and recognize, using context, where substitutions/deletions have occurred.          Review of Systems

## 2025-07-24 ENCOUNTER — TELEPHONE (OUTPATIENT)
Dept: SLEEP CENTER | Facility: CLINIC | Age: 70
End: 2025-07-24

## 2025-07-25 DIAGNOSIS — E66.01 MORBID OBESITY WITH BMI OF 40.0-44.9, ADULT (HCC): ICD-10-CM

## 2025-07-28 RX ORDER — TIRZEPATIDE 15 MG/.5ML
15 INJECTION, SOLUTION SUBCUTANEOUS WEEKLY
Qty: 2 ML | Refills: 0 | Status: SHIPPED | OUTPATIENT
Start: 2025-07-28

## (undated) DEVICE — SUT VICRYL PLUS 0 CTB-1 27 IN VCPB260H

## (undated) DEVICE — PLUMEPEN PRO 10FT

## (undated) DEVICE — SPINNING CEMENT MIXING BOWL

## (undated) DEVICE — GUIDE PIN 2.5 X 220MM AEQUALIS PERFORM PLUS

## (undated) DEVICE — DRAPE EQUIPMENT RF WAND

## (undated) DEVICE — HEAVY DUTY TABLE COVER: Brand: CONVERTORS

## (undated) DEVICE — DECANTER: Brand: UNBRANDED

## (undated) DEVICE — KIT STABILIZATION SHOULDER MARCO

## (undated) DEVICE — SPONGE PVP SCRUB WING STERILE

## (undated) DEVICE — CAPIT ASCEND FLEX TSA W PERFORM

## (undated) DEVICE — GLOVE SRG BIOGEL 7.5

## (undated) DEVICE — PROXIMATE PLUS MD MULTI-DIRECTIONAL RELEASE SKIN STAPLERS CONTAINS 35 STAINLESS STEEL STAPLES APPROXIMATE CLOSED DIMENSIONS: 6.9MM X 3.9MM WIDE: Brand: PROXIMATE

## (undated) DEVICE — DRESSING MEPILEX AG BORDER 4 X 8 IN

## (undated) DEVICE — THE SIMPULSE SOLO SYSTEM WITH ULTREX RETRACTABLE SPLASH SHIELD TIP: Brand: SIMPULSE SOLO

## (undated) DEVICE — BETHLEHEM UNIVERSAL MINOR GEN: Brand: CARDINAL HEALTH

## (undated) DEVICE — GLOVE INDICATOR PI UNDERGLOVE SZ 7.5 BLUE

## (undated) DEVICE — ARTHROSCOPY FLOOR MAT

## (undated) DEVICE — INTENDED FOR TISSUE SEPARATION, AND OTHER PROCEDURES THAT REQUIRE A SHARP SURGICAL BLADE TO PUNCTURE OR CUT.: Brand: BARD-PARKER SAFETY BLADES SIZE 10, STERILE

## (undated) DEVICE — DUAL CUT SAGITTAL BLADE

## (undated) DEVICE — INTENDED FOR TISSUE SEPARATION, AND OTHER PROCEDURES THAT REQUIRE A SHARP SURGICAL BLADE TO PUNCTURE OR CUT.: Brand: BARD-PARKER SAFETY BLADES SIZE 15, STERILE

## (undated) DEVICE — SUT 2 ORTHOCORD MO7

## (undated) DEVICE — PACK MAJOR ORTHO W/SPLITS PBDS

## (undated) DEVICE — PENCIL ELECTROSURG E-Z CLEAN -0035H

## (undated) DEVICE — GLOVE SRG BIOGEL ECLIPSE 7

## (undated) DEVICE — SUT 2 ORTHOCORD 36 IN W/O NEEDLES

## (undated) DEVICE — IMPERVIOUS STOCKINETTE: Brand: DEROYAL

## (undated) DEVICE — DRESSING MEPILEX AG BORDER 4 X 10 IN

## (undated) DEVICE — 3M™ IOBAN™ 2 ANTIMICROBIAL INCISE DRAPE 6650EZ: Brand: IOBAN™ 2

## (undated) DEVICE — HOOD: Brand: FLYTE, SURGICOOL

## (undated) DEVICE — ADHESIVE SKIN HIGH VISCOSITY EXOFIN 1ML

## (undated) DEVICE — CHLORAPREP HI-LITE 26ML ORANGE

## (undated) DEVICE — 60 ML SYRINGE,REGULAR TIP: Brand: MONOJECT

## (undated) DEVICE — SUT VICRYL PLUS 2-0 CTB-1 27 IN VCPB259H

## (undated) DEVICE — INTENT TO BE USED WITH SUTURE MATERIAL FOR TISSUE CLOSURE: Brand: RICHARD-ALLAN®  NEEDLE 1/2 CIRCLE REVERSE CUTTING